# Patient Record
Sex: MALE | Race: WHITE | Employment: OTHER | URBAN - METROPOLITAN AREA
[De-identification: names, ages, dates, MRNs, and addresses within clinical notes are randomized per-mention and may not be internally consistent; named-entity substitution may affect disease eponyms.]

---

## 2017-12-01 ENCOUNTER — ALLIED HEALTH/NURSE VISIT (OUTPATIENT)
Dept: FAMILY MEDICINE | Facility: CLINIC | Age: 67
End: 2017-12-01
Payer: MEDICARE

## 2017-12-01 DIAGNOSIS — Z23 NEED FOR PROPHYLACTIC VACCINATION AND INOCULATION AGAINST INFLUENZA: Primary | ICD-10-CM

## 2017-12-01 PROCEDURE — 99207 ZZC NO CHARGE NURSE ONLY: CPT

## 2017-12-01 PROCEDURE — 90662 IIV NO PRSV INCREASED AG IM: CPT

## 2017-12-01 PROCEDURE — G0008 ADMIN INFLUENZA VIRUS VAC: HCPCS

## 2017-12-01 NOTE — PROGRESS NOTES

## 2018-01-22 ENCOUNTER — HOSPITAL ENCOUNTER (OUTPATIENT)
Dept: MRI IMAGING | Facility: CLINIC | Age: 68
Discharge: HOME | End: 2018-01-22
Attending: PODIATRIST | Admitting: PODIATRIST
Payer: MEDICARE

## 2018-01-22 DIAGNOSIS — M25.571 RIGHT ANKLE PAIN, UNSPECIFIED CHRONICITY: ICD-10-CM

## 2018-01-22 PROCEDURE — 73721 MRI JNT OF LWR EXTRE W/O DYE: CPT | Mod: RT

## 2018-01-29 ENCOUNTER — OFFICE VISIT (OUTPATIENT)
Dept: FAMILY MEDICINE | Facility: CLINIC | Age: 68
End: 2018-01-29
Payer: MEDICARE

## 2018-01-29 VITALS
BODY MASS INDEX: 32.07 KG/M2 | DIASTOLIC BLOOD PRESSURE: 80 MMHG | WEIGHT: 224 LBS | SYSTOLIC BLOOD PRESSURE: 132 MMHG | RESPIRATION RATE: 14 BRPM | TEMPERATURE: 97.6 F | HEIGHT: 70 IN | HEART RATE: 67 BPM

## 2018-01-29 DIAGNOSIS — G47.33 OBSTRUCTIVE SLEEP APNEA SYNDROME: ICD-10-CM

## 2018-01-29 DIAGNOSIS — Z01.818 PREOP GENERAL PHYSICAL EXAM: Primary | ICD-10-CM

## 2018-01-29 DIAGNOSIS — E78.2 MIXED HYPERLIPIDEMIA: ICD-10-CM

## 2018-01-29 DIAGNOSIS — I10 ESSENTIAL HYPERTENSION: ICD-10-CM

## 2018-01-29 DIAGNOSIS — F32.0 MAJOR DEPRESSIVE DISORDER, SINGLE EPISODE, MILD (H): ICD-10-CM

## 2018-01-29 LAB
ANION GAP SERPL CALCULATED.3IONS-SCNC: 4 MMOL/L (ref 3–14)
BUN SERPL-MCNC: 16 MG/DL (ref 7–30)
CALCIUM SERPL-MCNC: 8.8 MG/DL (ref 8.5–10.1)
CHLORIDE SERPL-SCNC: 103 MMOL/L (ref 94–109)
CO2 SERPL-SCNC: 29 MMOL/L (ref 20–32)
CREAT SERPL-MCNC: 0.89 MG/DL (ref 0.66–1.25)
GFR SERPL CREATININE-BSD FRML MDRD: 85 ML/MIN/1.7M2
GLUCOSE SERPL-MCNC: 87 MG/DL (ref 70–99)
LDLC SERPL DIRECT ASSAY-MCNC: 108 MG/DL
POTASSIUM SERPL-SCNC: 3.9 MMOL/L (ref 3.4–5.3)
SODIUM SERPL-SCNC: 136 MMOL/L (ref 133–144)

## 2018-01-29 PROCEDURE — 36415 COLL VENOUS BLD VENIPUNCTURE: CPT | Performed by: FAMILY MEDICINE

## 2018-01-29 PROCEDURE — 93000 ELECTROCARDIOGRAM COMPLETE: CPT | Performed by: FAMILY MEDICINE

## 2018-01-29 PROCEDURE — 83721 ASSAY OF BLOOD LIPOPROTEIN: CPT | Performed by: FAMILY MEDICINE

## 2018-01-29 PROCEDURE — 80048 BASIC METABOLIC PNL TOTAL CA: CPT | Performed by: FAMILY MEDICINE

## 2018-01-29 PROCEDURE — 99205 OFFICE O/P NEW HI 60 MIN: CPT | Performed by: FAMILY MEDICINE

## 2018-01-29 RX ORDER — ATORVASTATIN CALCIUM 10 MG/1
10 TABLET, FILM COATED ORAL
Status: ON HOLD | COMMUNITY
Start: 2017-10-02 | End: 2021-08-24

## 2018-01-29 RX ORDER — HYDROCHLOROTHIAZIDE 25 MG/1
25 TABLET ORAL DAILY
COMMUNITY
Start: 2020-12-15

## 2018-01-29 RX ORDER — ENALAPRIL MALEATE 20 MG/1
20 TABLET ORAL DAILY
COMMUNITY
Start: 2021-07-14

## 2018-01-29 NOTE — MR AVS SNAPSHOT
After Visit Summary   1/29/2018    Jay Carter    MRN: [id number]           Patient Information     Date Of Birth          1950        Visit Information        Provider Department      1/29/2018 9:00 AM Moe Yung MD [facility]        Today's Diagnoses     Preop general physical exam    -  1    Essential hypertension        Mixed hyperlipidemia        Major depressive disorder, single episode, mild (H)        Obstructive sleep apnea syndrome          Care Instructions    Follow preoperative instructions given by your surgeon.  Your results and recommendations will be available to your surgeon through fax.  We will notify you of any abnormality with today's tests if present.  Please follow up at your convenience after the surgery for your adult well-exam and chronic condition management.    You may take your blood pressure medication (enalapril and hydrochlorothiazide) on day of surgery to keep your blood pressure in control.  No Aspirin, ibuprofen, or naproxen before the surgery.  May take all other medications as scheduled except for morning of surgery.    Before Your Surgery      Call your surgeon if there is any change in your health. This includes signs of a cold or flu (such as a sore throat, runny nose, cough, rash or fever).    Do not smoke, drink alcohol or take over the counter medicine (unless your surgeon or primary care doctor tells you to) for the 24 hours before and after surgery.    If you take prescribed drugs: Follow your doctor s orders about which medicines to take and which to stop until after surgery.    Eating and drinking prior to surgery: follow the instructions from your surgeon    Take a shower or bath the night before surgery. Use the soap your surgeon gave you to gently clean your skin. If you do not have soap from your surgeon, use your regular soap. Do not shave or scrub the surgery site.  Wear clean pajamas and have clean sheets  "on your bed.           Follow-ups after your visit        Additional Services     SLEEP EVALUATION & MANAGEMENT REFERRAL - ADULT [facility]  [phone] (Age 2 and up)       Please be aware that coverage of these services is subject to the terms and limitations of your health insurance plan.  Call member services at your health plan with any benefit or coverage questions.      Please bring the following to your appointment:    >>   List of current medications   >>   This referral request   >>   Any documents/labs given to you for this referral                      Future tests that were ordered for you today     Open Future Orders        Priority Expected Expires Ordered    SLEEP EVALUATION & MANAGEMENT REFERRAL - [facility]  [phone] (Age 2 and up) Routine  1/29/2019 1/29/2018            Who to contact     If you have questions or need follow up information about today's clinic visit or your schedule please contact [facility] directly at [phone].  Normal or non-critical lab and imaging results will be communicated to you by MyChart, letter or phone within 4 business days after the clinic has received the results. If you do not hear from us within 7 days, please contact the clinic through [employer] or phone. If you have a critical or abnormal lab result, we will notify you by phone as soon as possible.  Submit refill requests through GoTable or call your pharmacy and they will forward the refill request to us. Please allow 3 business days for your refill to be completed.          Additional Information About Your Visit        GoTable Information     GoTable lets you send messages to your doctor, view your test results, renew your prescriptions, schedule appointments and more. To sign up, go to [email or web address] . Click on \"Log in\" on the left side of the screen, which will take you to the Welcome page. Then click on \"Sign up Now\" on the " "right side of the page.     You will be asked to enter the access code listed below, as well as some personal information. Please follow the directions to create your username and password.     Your access code is: [email or web address]  Expires: 3/1/2018 12:05 PM     Your access code will  in 90 days. If you need help or a new code, please call your [place] clinic or [phone].        Care EveryWhere ID     This is your Care EveryWhere ID. This could be used by other organizations to access your [place] medical records  [id number]        Your Vitals Were     Pulse Temperature Respirations Height BMI (Body Mass Index)       67 97.6  F (36.4  C) (Tympanic) 14 5' 10\" (1.778 m) 32.14 kg/m2        Blood Pressure from Last 3 Encounters:   18 132/80    Weight from Last 3 Encounters:   18 224 lb (101.6 kg)              We Performed the Following     Basic metabolic panel     EKG 12-lead complete w/read - Clinics     LDL cholesterol direct        Primary Care Provider Office Phone # Fax #    [facility] [phone] [address] [address] [place], [place] [address]        Equal Access to Services     JAROD GUARDADO AH: Hadii jami romeroo Sosamaraali, waaxda luqadaha, qaybta kaalmada adeegyada, urvashi dooley. So [facility] [phone].    ATENCIÓN: Si habla español, tiene a mccarthy disposición servicios gratuitos de asistencia lingüística. Llame al [phone].    We comply with applicable federal civil rights laws and [place] laws. We do not discriminate on the basis of race, color, national origin, age, disability, sex, sexual orientation, or gender identity.            Thank you!     Thank you for choosing [facility]  for your care. Our goal is always to provide you with excellent care. Hearing back from our patients is one way we can continue to improve our services. Please take a few minutes to complete the written survey that you may receive in the " mail after your visit with us. Thank you!             Your Updated Medication List - Protect others around you: Learn how to safely use, store and throw away your medicines at www.disposemymeds.org.          This list is accurate as of 1/29/18  9:51 AM.  Always use your most recent med list.                   Brand Name Dispense Instructions for use Diagnosis    atorvastatin 10 MG tablet    LIPITOR     Take 10 mg by mouth        enalapril 20 MG tablet    VASOTEC          hydrochlorothiazide 25 MG tablet    HYDRODIURIL     Take 25 mg by mouth        GILBERT MULTIVITAMIN FOR MEN Tabs      Take 1 tablet by mouth daily        OXYCODONE HCL PO      Take 10 mg by mouth every 6 hours as needed        VIAGRA 100 MG tablet   Generic drug:  sildenafil      Take 100 mg by mouth daily as needed Take 1 tablet by mouth once daily if needed for Erectile Dysfunction. Take 30min to 4 hours before sexual activity. Max 100mg/24hr.        WELLBUTRIN XL PO      Take 300 mg by mouth daily

## 2018-01-29 NOTE — NURSING NOTE
"Chief Complaint   Patient presents with     Pre-Op Exam     DOS 2/6/18       Initial /73  Pulse 67  Temp 97.6  F (36.4  C) (Tympanic)  Ht 5' 10\" (1.778 m)  Wt 224 lb (101.6 kg)  BMI 32.14 kg/m2 Estimated body mass index is 32.14 kg/(m^2) as calculated from the following:    Height as of this encounter: 5' 10\" (1.778 m).    Weight as of this encounter: 224 lb (101.6 kg).  Medication Reconciliation: complete  Frida Yoder CMA    "

## 2018-01-29 NOTE — PROGRESS NOTES
[facility]  [address] [address]  [phone]  Dept: [phone]    PRE-OP EVALUATION:  Today's date: 2018    Jay Carter (: 1950) presents for pre-operative evaluation assessment as requested by Dr. Main.  He requires evaluation and anesthesia risk assessment prior to undergoing surgery/procedure for treatment of right ankle .  Proposed procedure: right ankle tendon repair, ligament repair    Date of Surgery/ Procedure: 18  Time of Surgery/ Procedure: unknown  Hospital/Surgical Facility: [facility]  Fax number for surgical facility: [phone]  Primary Physician: [facility], [facility]  Type of Anesthesia Anticipated: to be determined    Patient has a Health Care Directive or Living Will:  NO    1. NO - Do you have a history of heart attack, stroke, stent, bypass or surgery on an artery in the head, neck, heart or legs?  2. NO - Do you ever have any pain or discomfort in your chest?  3. NO - Do you have a history of  Heart Failure?  4. NO - Are you troubled by shortness of breath when: walking on the level, up a slight hill or at night?  5. NO - Do you currently have a cold, bronchitis or other respiratory infection?  6. NO - Do you have a cough, shortness of breath or wheezing?  7. YES - DO YOU SOMETIMES GET PAINS IN THE CALVES OF YOUR LEGS WHEN YOU WALK? Due to torn tendon in right ankle  8. NO - Do you or anyone in your family have previous history of blood clots?  9. NO - Do you or does anyone in your family have a serious bleeding problem such as prolonged bleeding following surgeries or cuts?  10. NO - Have you ever had problems with anemia or been told to take iron pills?  11. NO - Have you had any abnormal blood loss such as black, tarry or bloody stools, or abnormal vaginal bleeding?  12. YES - HAVE YOU EVER HAD A BLOOD TRANSFUSION? Blood transfusion in the past with no complications  13. NO - Have you or any of your  relatives ever had problems with anesthesia?  14. YES - DO YOU HAVE SLEEP APNEA, EXCESSIVE SNORING OR DAYTIME DROWSINESS? Pt has been dx with sleep apnea but does not use any applicances.  15. NO - Do you have any prosthetic heart valves?  16. NO - Do you have prosthetic joints?  17. NO - Is there any chance that you may be pregnant?      HPI:                                                      Brief HPI related to upcoming procedure: Patient has peroneus brevis and longus tear. Hence, planned surgery. Reviewed above with patient.        See problem list for active medical problems.  Problems all longstanding and stable, except as noted/documented.  See ROS for pertinent symptoms related to these conditions.                                                                                                  .  HYPERTENSION - Patient has longstanding history of mod-severe HTN , currently denies any symptoms referable to elevated blood pressure. Specifically denies chest pain, palpitations, dyspnea, orthopnea, PND or peripheral edema. Blood pressure readings have been in normal range. Current medication regimen is as listed below. Patient denies any side effects of medication.                                                                                                                                                                                            .  HYPERLIPIDEMIA - Patient has a long history of significant Hyperlipidemia requiring medication for treatment with recent unable to assess control. Patient reports no problems or side effects with the medication.                                                                                                                                                         .  DEPRESSION - Patient has a long history of Depression of moderate severity requiring medication for control with recent symptoms being stable..Current symptoms of depression include none.                                                                                                                                                                                     .    MEDICAL HISTORY:                                                      Patient Active Problem List    Diagnosis Date Noted     Hypertension 08/24/2016     Priority: Medium     Overview:   Enalapril started 11/2013       Hyperlipidemia 08/24/2016     Priority: Medium     Overview:   Pretreatment [id number]/5/2013 Chol 203, TG 90, HDL 41, . Strong maternal FH of CAD.  Lipitor 10 mg started 3/2014.        Obstructive sleep apnea syndrome 08/24/2016     Priority: Medium     Overview:   Initial sleep study 7/2013.  His wife passed away (cancer) in Aug, 2013.  He did not start CPAP at that time.       Major depressive disorder, single episode, mild (H) 02/25/2016     Priority: Medium      History reviewed. No pertinent past medical history.  History reviewed. No pertinent surgical history.  Current Outpatient Prescriptions   Medication Sig Dispense Refill     atorvastatin (LIPITOR) 10 MG tablet Take 10 mg by mouth       enalapril (VASOTEC) 20 MG tablet        hydrochlorothiazide (HYDRODIURIL) 25 MG tablet Take 25 mg by mouth       Multiple Vitamins-Minerals (GILBERT MULTIVITAMIN FOR MEN) TABS Take 1 tablet by mouth daily       sildenafil (VIAGRA) 100 MG tablet Take 100 mg by mouth daily as needed Take 1 tablet by mouth once daily if needed for Erectile Dysfunction. Take 30min to 4 hours before sexual activity. Max 100mg/24hr.       BuPROPion HCl (WELLBUTRIN XL PO) Take 300 mg by mouth daily       OXYCODONE HCL PO Take 10 mg by mouth every 6 hours as needed       [DISCONTINUED] ENALAPRIL MALEATE PO Take 20 mg by mouth daily       [DISCONTINUED] HYDROCHLOROTHIAZIDE PO Take 25 mg by mouth daily       OTC products: None, except as noted above    Allergies   Allergen Reactions     Ampicillin Rash     Vancomycin Tinnitus     Ciprofloxacin      Adhesive Tape  "Rash      Latex Allergy: NO    Social History   Substance Use Topics     Smoking status: Former Smoker     Years: 3.00     Smokeless tobacco: Never Used     Alcohol use Yes      Comment: occasional     History   Drug Use No       REVIEW OF SYSTEMS:                                                    C: NEGATIVE for fever, chills, or change in weight  INTEGUMENTARY/SKIN: patient reports recurrent eczema treated with hydrocortisone topically  E: NEGATIVE for vision changes or irritation  E/M: NEGATIVE for ear, mouth and throat problems  R: NEGATIVE for significant cough or SOB  CV: NEGATIVE for chest pain, palpitations or peripheral edema  GI: NEGATIVE for nausea, abdominal pain, heartburn, or change in bowel habits  : NEGATIVE for frequency, dysuria, or hematuria  M: NEGATIVE for significant arthralgias or myalgia  N: NEGATIVE for weakness, dizziness or paresthesias  E: NEGATIVE for temperature intolerance, skin/hair changes  H: NEGATIVE for bleeding problems  P: NEGATIVE for changes in mood or affect    EXAM:                                                    /80  Pulse 67  Temp 97.6  F (36.4  C) (Tympanic)  Resp 14  Ht 5' 10\" (1.778 m)  Wt 224 lb (101.6 kg)  BMI 32.14 kg/m2  GENERAL APPEARANCE: obese, alert and no distress; ambulatory w/o assist  EYES: pink conj, no icterus, PERRL, EOMI  HENT: ear canals and TM's normal, nose and mouth without ulcers or lesions, oropharynx clear and oral mucous membranes moist  NECK: no adenopathy, no asymmetry, masses, or scars and thyroid normal to palpation  RESP: lungs clear to auscultation - no rales, rhonchi or wheezes  CV: regular rates and rhythm, normal S1 S2, no S3 or S4, no murmur, click or rub, no peripheral edema and peripheral pulses strong  ABDOMEN: soft, nontender, no hepatosplenomegaly, no masses and bowel sounds normal  MS: no musculoskeletal defects are noted and gait is age appropriate without ataxia  SKIN: good turgor, no " rash/jaundice/ecchymosis  NEURO: Normal strength and tone, sensory exam grossly normal, mentation intact and speech normal    DIAGNOSTICS:                                                      EKG: Normal Sinus Rhythm, normal axis, normal intervals, no acute ST/T changes c/w ischemia, no LVH by voltage criteria, unchanged from previous tracings  Labs Resulted Today:   Results for orders placed or performed in visit on 01/29/18   LDL cholesterol direct   Result Value Ref Range    LDL Cholesterol Direct 108 (H) <100 mg/dL   Basic metabolic panel   Result Value Ref Range    Sodium 136 133 - 144 mmol/L    Potassium 3.9 3.4 - 5.3 mmol/L    Chloride 103 94 - 109 mmol/L    Carbon Dioxide 29 20 - 32 mmol/L    Anion Gap 4 3 - 14 mmol/L    Glucose 87 70 - 99 mg/dL    Urea Nitrogen 16 7 - 30 mg/dL    Creatinine 0.89 0.66 - 1.25 mg/dL    GFR Estimate 85 >60 mL/min/1.7m2    GFR Estimate If Black >90 >60 mL/min/1.7m2    Calcium 8.8 8.5 - 10.1 mg/dL       No results for input(s): HGB, PLT, INR, NA, POTASSIUM, CR, A1C in the last 32094 hours.     IMPRESSION:                                                    Reason for surgery/procedure: peroneus brevis and longus tendon rupture/tear, right  Diagnosis/reason for consult: preoperative evaluation, hypertension, hyperlipidemia, TARA    The proposed surgical procedure is considered INTERMEDIATE risk.    REVISED CARDIAC RISK INDEX  The patient has the following serious cardiovascular risks for perioperative complications such as (MI, PE, VFib and 3  AV Block):  No serious cardiac risks  INTERPRETATION: 0 risks: Class I (very low risk - 0.4% complication rate)    The patient has the following additional risks for perioperative complications:  No identified additional risks  The ASCVD Risk score (Sanjuanita CARMINA Jr, et al., 2013) failed to calculate for the following reasons:    Cannot find a previous HDL lab    Cannot find a previous total cholesterol lab      ICD-10-CM    1. Preop general  physical exam Z01.818 Basic metabolic panel     EKG 12-lead complete w/read - Clinics   2. Essential hypertension I10 Basic metabolic panel     EKG 12-lead complete w/read - Clinics   3. Mixed hyperlipidemia E78.2 LDL cholesterol direct     EKG 12-lead complete w/read - Clinics   4. Major depressive disorder, single episode, mild (H) F32.0    5. Obstructive sleep apnea syndrome G47.33 SLEEP EVALUATION & MANAGEMENT REFERRAL - ADULT -[facility]  [phone] (Age 2 and up)       RECOMMENDATIONS:                                                      --Consult hospital rounder / IM to assist post-op medical management    Cardiovascular Risk  Performs 4 METs exercise without symptoms (Light housework (dusting, washing dishes), Climb a flight of stairs and Walk on level ground at 15 minutes per mile (4 miles/hour)) .   EKG done due to hx of hypertension, hyperlipidemia and tp's age >66 y/o. No change to suspect ischemia.  BP is currently <140/90 on two medications.      Obstructive Sleep Apnea (or suspected sleep apnea)  Patient is referred for sleep consult to be done after his surgery.      --Patient is to take all scheduled medications on the day of surgery EXCEPT for modifications listed below.    Anticoagulant or Antiplatelet Medication Use  Patient was advised not to take ASA, or NSAIDs prior to surgery.        ACE Inhibitor or Angiotensin Receptor Blocker (ARB) Use  Ace inhibitor or Angiotensin Receptor Blocker (ARB) and will continue this medication due to the higher risk of uncontrolled perioerative hypertension (e.g. neurosurgical procedure)      APPROVAL GIVEN to proceed with proposed procedure, without further diagnostic evaluation       Signed Electronically by: Moe Yung MD    Copy of this evaluation report is provided to requesting physician.    [place] Preop Guidelines

## 2018-01-29 NOTE — LETTER
January 29, 2018      Jay Carter  [address] [address]        Dear ,    We are writing to inform you of your test results.    BMP showed results within normal range. Non-fasting LDL is above desirable but not in range to be medically treated. Be consistent with low trans fat and saturated fat diet.   Eat food rich in omega-3-fatty acids as you tolerate. (salmon, olive oil)   Eat 5 cups of vegetables, fruits and whole grains per day.  Limit starchy food (white rice, white bread, white pasta, white potatoes) to less than a cup per meal. Minimize sweets, junk food and fastfood. Limit soda beverages to one serving per day; best to avoid it altogether though.   Exercise: moderate intensity sustained for at least 30 mins per episode, goal of 150 mins per week at least.  Combine cardiovascular and resistance exercises.   These exercise recommendations are in addition to your daily activity at work or home.     Schedule Sleep Study as ordered after the surgery. Answer sleep clinic questionnaire prior to that appointment and bring to appointment.     Resulted Orders   LDL cholesterol direct   Result Value Ref Range    LDL Cholesterol Direct 108 (H) <100 mg/dL      Comment:      Above desirable:  100-129 mg/dl  Borderline High:  130-159 mg/dL  High:             160-189 mg/dL  Very high:       >189 mg/dl     Basic metabolic panel   Result Value Ref Range    Sodium 136 133 - 144 mmol/L    Potassium 3.9 3.4 - 5.3 mmol/L    Chloride 103 94 - 109 mmol/L    Carbon Dioxide 29 20 - 32 mmol/L    Anion Gap 4 3 - 14 mmol/L    Glucose 87 70 - 99 mg/dL      Comment:      Non Fasting    Urea Nitrogen 16 7 - 30 mg/dL    Creatinine 0.89 0.66 - 1.25 mg/dL    GFR Estimate 85 >60 mL/min/1.7m2      Comment:      Non  GFR Calc    GFR Estimate If Black >90 >60 mL/min/1.7m2      Comment:       GFR Calc    Calcium 8.8 8.5 - 10.1 mg/dL       If you have any questions or concerns,  please call the clinic at the number listed above.       Sincerely,        Moe Yung MD

## 2018-01-29 NOTE — PATIENT INSTRUCTIONS
Follow preoperative instructions given by your surgeon.  Your results and recommendations will be available to your surgeon through fax.  We will notify you of any abnormality with today's tests if present.  Please follow up at your convenience after the surgery for your adult well-exam and chronic condition management.    You may take your blood pressure medication (enalapril and hydrochlorothiazide) on day of surgery to keep your blood pressure in control.  No Aspirin, ibuprofen, or naproxen before the surgery.  May take all other medications as scheduled except for morning of surgery.    Before Your Surgery      Call your surgeon if there is any change in your health. This includes signs of a cold or flu (such as a sore throat, runny nose, cough, rash or fever).    Do not smoke, drink alcohol or take over the counter medicine (unless your surgeon or primary care doctor tells you to) for the 24 hours before and after surgery.    If you take prescribed drugs: Follow your doctor s orders about which medicines to take and which to stop until after surgery.    Eating and drinking prior to surgery: follow the instructions from your surgeon    Take a shower or bath the night before surgery. Use the soap your surgeon gave you to gently clean your skin. If you do not have soap from your surgeon, use your regular soap. Do not shave or scrub the surgery site.  Wear clean pajamas and have clean sheets on your bed.

## 2018-04-05 ENCOUNTER — TELEPHONE (OUTPATIENT)
Dept: FAMILY MEDICINE | Facility: CLINIC | Age: 68
End: 2018-04-05

## 2018-04-05 NOTE — TELEPHONE ENCOUNTER
Panel Management Review      Composite cancer screening  Chart review shows that this patient is due/due soon for the following Colonoscopy  Summary:    Patient is due/failing the following:   COLONOSCOPY and PHQ9    Action needed:   Patient needs to do PHQ9. and Patient needs referral/order: colonoscopy    Type of outreach:    Sent letter. and also phq-9 for pt to fill out and return    Questions for provider review:    None                                                                                                                                    Frida Yoder,TOY

## 2018-04-05 NOTE — LETTER
April 5, 2018      Jay Carter  [address] [address]          At this time you are due for a Colon Cancer Screening. Here is some information regarding this testing.     Recommended every 5-10 years, depending on your history, in order to prevent and detect colon cancer at its earliest stages.  Colon cancer is now the second leading cause of death in the [place] for both men and women and there are over 130,000 new cases and 50,000 deaths per year from colon cancer.  Colonoscopies can prevent 90-95% of these deaths.  Problem lesions can be removed before they ever become cancer. This test is not only looking for cancer, but also getting rid of precancerious lesions. You are usually given some sedation which makes the test very comfortable for most people.      If you do not wish to do a colonoscopy or cannot afford to do one, at this time, there is another option. It is called a FIT test or Fecal Immunochemical Occult Blood Test (take home stool sample kit).  It does not replace the colonoscopy for colorectal cancer screening, but it can detect hidden bleeding in the lower colon.  It does need to be repeated every year and if a positive result is obtained, you would be referred for a colonoscopy. The FIT test is really easy to do and does not require any  diet or medication restrictions and involves only one collection sample.      If you have completed either one of these tests or had a flexible sigmoidoscopy in the past five years at another facility, please have the records sent to our clinic so that we can best coordinate your care and update your chart.  Please call us if you have questions or would like arrange either to do a colonoscopy or obtain the necessary test kit for the Fecal Occult Test.      Sincerely,        Moe Yung MD

## 2018-11-26 ENCOUNTER — RECORDS - HEALTHEAST (OUTPATIENT)
Dept: ADMINISTRATIVE | Facility: OTHER | Age: 68
End: 2018-11-26

## 2019-01-09 ENCOUNTER — HOSPITAL ENCOUNTER (OUTPATIENT)
Dept: PHYSICAL THERAPY | Facility: CLINIC | Age: 69
Setting detail: THERAPIES SERIES
End: 2019-01-09
Attending: ORTHOPAEDIC SURGERY
Payer: COMMERCIAL

## 2019-01-09 PROCEDURE — 97161 PT EVAL LOW COMPLEX 20 MIN: CPT | Mod: GP

## 2019-01-09 PROCEDURE — G8978 MOBILITY CURRENT STATUS: HCPCS | Mod: GP,CJ

## 2019-01-09 PROCEDURE — G8979 MOBILITY GOAL STATUS: HCPCS | Mod: GP,CI

## 2019-01-09 PROCEDURE — 97110 THERAPEUTIC EXERCISES: CPT | Mod: GP

## 2019-01-09 NOTE — PROGRESS NOTES
01/09/19 0900   General Information   Type of Visit Initial OP Ortho PT Evaluation   Start of Care Date 01/09/19   Referring Physician Dr Acosta   Patient/Family Goals Statement strengthen his L to prepare for surgery, get rid of hip pain   Orders Evaluate and Treat   Date of Order 01/08/19   Insurance Type Medicare;are   Medical Diagnosis L lateral hip pain   Surgical/Medical history reviewed Yes  (Ca, depression, HTN, smoking, mult surgeries)   Body Part(s)   Body Part(s) Hip;Knee   Presentation and Etiology   Pertinent history of current problem (include personal factors and/or comorbidities that impact the POC) Pt had L knee surgery in 1966. Now the MD says he needs a L TKA. It is causing L hip pain with the way he is walking. He injured his L hip 2 years ago cross country skiing after falling on his hip. He had quite a bit of pain initially, but now it is better but the pain has never gone away. He is here to strengthen his leg to prepare for surgery.   Impairments H. Impaired gait;A. Pain   Functional Limitations perform activities of daily living;perform desired leisure / sports activities  (sleeping, walking >75 yds jeanine on uneven ground)   Symptom Location L anterolateral hip, L med knee   How/Where did it occur With a fall   Onset date of current episode/exacerbation 03/21/18   Chronicity Chronic   Pain rating (0-10 point scale) Best (/10);Worst (/10)   Best (/10) 0  (at rest)   Worst (/10) 8  (walking on uneven ground)   Pain quality G. Cramping   Frequency of pain/symptoms C. With activity   Pain/symptoms are: The same all the time   Pain/symptoms exacerbated by B. Walking  (sleeping)   Pain/symptoms eased by A. Sitting;C. Rest;I. OTC medication(s)   Progression of symptoms since onset: Worsened   Prior Level of Function   Functional Level Prior Comment independent   Current Level of Function   Current Community Support Family/friend caregiver   Patient role/employment history F. Retired   [facility]   Home/community accessibility stairs, drives   Current equipment-Gait/Locomotion None   Fall Risk Screen   Fall screen completed by PT   Have you fallen 2 or more times in the past year? No   Have you fallen and had an injury in the past year? No   Is patient a fall risk? No   Functional Scales   Functional Scales Other   Other Scales  LEFS 49/80   Knee Objective Findings   Side (if bilateral, select both right and left) Left   Gait/Locomotion slightly fwd flexed, wide GUANAKITO, lat trunk dev B, normal veda   Balance/Proprioception (Single Leg Stance) R 3\", L 3\" with incr deviations   Knee ROM Comment R 0*-129*   Lachmans Test -   Anterior Drawer Test -   Posterior Drawer Test -   Varus Stress Test -   Valgus Stress Test -   Lauren's Test -   Apley's Test -   Apprehension Test -   Palpation no palpable tenderness along joint line, MCL, LCL, patellar tendon   Left Knee Extension AROM 0*   Left Knee Flexion AROM 112*   Left Knee Flexion PROM 128*   Left Hamstring Flexibility tight at 50*   Left Hip Flexor Flexibility mildly tight   Left Quadricep Flexibility mildly tight   Left ITB Flexibility mod tight   Hip Objective Findings   Side (if bilateral, select both right and left) Left   Hip ROM Comments FADIR, hip IR and Obers incr L med knee pain   Hip/Knee Strength Comments R PF 4+/5, L 5/5   Straight Leg Raise Test -   LINO Test -   FADIR Test L incr L med knee pain   Palpation tender L tensor fascia tiffanie, L ant groin   Left Hip Flexion PROM WFL   Left Hip Abduction PROM WFL   Left Hip Adduction PROM mildly limited   Left Hip ER PROM WFL   Left Hip IR PROM mod limited   Left Hip Ext PROM WFL   Left Hip Flexion Strength 5/5   Left Hip Abduction Strength 5/5   Left Hip Extension Strength 4+/5   Left Hip IR Strength 5-/5   Left Hip ER Strength 4/5   Left Knee Flexion Strength 5/5   Left Knee Extension Strength 5/5   Obers/ITB Flexibility L tight   Planned Therapy Interventions   Planned " Therapy Interventions strengthening;manual therapy;stretching;balance training   Clinical Impression   Criteria for Skilled Therapeutic Interventions Met yes, treatment indicated   PT Diagnosis L hip pain. Signs and symptoms suggest tensor fascia tiffanie strain with pain referral to  med knee.   Influenced by the following impairments pain, weakness   Functional limitations due to impairments walking on even and uneven ground, sleeping   Clinical Presentation Stable/Uncomplicated   Clinical Presentation Rationale clinical judgement   Clinical Decision Making (Complexity) Low complexity   Therapy Frequency 1 time/week   Predicted Duration of Therapy Intervention (days/wks) 6 weeks   Risk & Benefits of therapy have been explained Yes   Patient, Family & other staff in agreement with plan of care Yes   Education Assessment   Preferred Learning Style Listening;Demonstration;Pictures/video   Barriers to Learning No barriers   ORTHO GOALS   PT Ortho Eval Goals 1;2;3;4   Ortho Goal 1   Goal Identifier 1   Goal Description Pt will be able to sleep through the night without waking from pain.   Target Date 01/30/19   Ortho Goal 2   Goal Identifier 2   Goal Description Pt will be able to walk 1 mile on level surfaces with < 3/10 pain.   Target Date 02/13/19   Ortho Goal 3   Goal Identifier 3   Goal Description Pt will be able to walk 1 mile on uneven surfaces with < 3/10 pain.   Target Date 02/20/19   Ortho Goal 4   Goal Identifier 4   Goal Description Pt will be independent with HEP for optimal functional recovery.   Target Date 02/20/19   Total Evaluation Time   PT Eval, Low Complexity Minutes (01603) 30   Therapy Certification   Certification date from 01/09/19   Certification date to 04/08/19   Medical Diagnosis L lateral hip pain     Lavern Borjas PT

## 2019-01-09 NOTE — PROGRESS NOTES
[facility]          OUTPATIENT PHYSICAL THERAPY ORTHOPEDIC EVALUATION  PLAN OF TREATMENT FOR OUTPATIENT REHABILITATION  (COMPLETE FOR INITIAL CLAIMS ONLY)  Patient's Last Name, First Name, M.I.  YOB: 1950  Jay Carter    Provider s Name:  [facility]   Medical Record No.  [id number]   Start of Care Date:  01/09/19   Onset Date:  03/21/18   Type:     _X__PT   ___OT   ___SLP Medical Diagnosis:  L lateral hip pain     PT Diagnosis:  L hip pain. Signs and symptoms suggest tensor fascia tiffanie strain with pain referral to L med knee.   Visits from SOC:  1      _________________________________________________________________________________  Plan of Treatment/Functional Goals:  strengthening, manual therapy, stretching, balance training           Goals  Goal Identifier: 1  Goal Description: Pt will be able to sleep through the night without waking from pain.  Target Date: 01/30/19    Goal Identifier: 2  Goal Description: Pt will be able to walk 1 mile on level surfaces with < 3/10 pain.  Target Date: 02/13/19    Goal Identifier: 3  Goal Description: Pt will be able to walk 1 mile on uneven surfaces with < 3/10 pain.  Target Date: 02/20/19    Goal Identifier: 4  Goal Description: Pt will be independent with HEP for optimal functional recovery.  Target Date: 02/20/19                                                Therapy Frequency:  1 time/week  Predicted Duration of Therapy Intervention:  6 weeks    JAYME SALAS PT                 I CERTIFY THE NEED FOR THESE SERVICES FURNISHED UNDER        THIS PLAN OF TREATMENT AND WHILE UNDER MY CARE .             Physician Signature               Date    X_____________________________________________________                             Certification Date From:  01/09/19   Certification Date To:  04/08/19    Referring Provider:  Dr Acosta    Initial Assessment        See Epic Evaluation Start of Care Date: 01/09/19

## 2019-01-10 ENCOUNTER — RECORDS - HEALTHEAST (OUTPATIENT)
Dept: ADMINISTRATIVE | Facility: OTHER | Age: 69
End: 2019-01-10

## 2019-01-16 ENCOUNTER — HOSPITAL ENCOUNTER (OUTPATIENT)
Dept: PHYSICAL THERAPY | Facility: CLINIC | Age: 69
Setting detail: THERAPIES SERIES
End: 2019-01-16
Attending: ORTHOPAEDIC SURGERY
Payer: COMMERCIAL

## 2019-01-16 PROCEDURE — 97110 THERAPEUTIC EXERCISES: CPT | Mod: GP

## 2019-01-17 ENCOUNTER — HOSPITAL ENCOUNTER (OUTPATIENT)
Dept: MRI IMAGING | Facility: CLINIC | Age: 69
Discharge: HOME | End: 2019-01-17
Attending: ORTHOPAEDIC SURGERY | Admitting: ORTHOPAEDIC SURGERY
Payer: COMMERCIAL

## 2019-01-17 DIAGNOSIS — M25.559 HIP PAIN: ICD-10-CM

## 2019-01-17 PROCEDURE — 72195 MRI PELVIS W/O DYE: CPT

## 2019-01-23 ENCOUNTER — HOSPITAL ENCOUNTER (OUTPATIENT)
Dept: PHYSICAL THERAPY | Facility: CLINIC | Age: 69
Setting detail: THERAPIES SERIES
End: 2019-01-23
Attending: ORTHOPAEDIC SURGERY
Payer: COMMERCIAL

## 2019-01-23 PROCEDURE — 97110 THERAPEUTIC EXERCISES: CPT | Mod: GP

## 2019-01-29 ENCOUNTER — HOSPITAL ENCOUNTER (OUTPATIENT)
Dept: PHYSICAL THERAPY | Facility: CLINIC | Age: 69
Setting detail: THERAPIES SERIES
End: 2019-01-29
Attending: ORTHOPAEDIC SURGERY
Payer: COMMERCIAL

## 2019-01-29 PROCEDURE — 97110 THERAPEUTIC EXERCISES: CPT | Mod: GP

## 2019-01-31 ASSESSMENT — MIFFLIN-ST. JEOR
SCORE: 1735.31
SCORE: 1735.31

## 2019-02-06 ENCOUNTER — HOSPITAL ENCOUNTER (OUTPATIENT)
Dept: MEDSURG UNIT | Facility: HOSPITAL | Age: 69
Discharge: HOME | End: 2019-02-07
Attending: UROLOGY | Admitting: UROLOGY
Payer: COMMERCIAL

## 2019-02-06 ENCOUNTER — SURGERY - HEALTHEAST (OUTPATIENT)
Dept: SURGERY | Facility: HOSPITAL | Age: 69
End: 2019-02-06

## 2019-02-06 ENCOUNTER — ANESTHESIA - HEALTHEAST (OUTPATIENT)
Dept: SURGERY | Facility: HOSPITAL | Age: 69
End: 2019-02-06

## 2019-02-06 DIAGNOSIS — C61 PROSTATE CANCER (H): ICD-10-CM

## 2019-02-06 LAB
ABO/RH(D): NORMAL
ANTIBODY SCREEN: NEGATIVE
GLUCOSE BLDC GLUCOMTR-MCNC: 91 MG/DL (ref 70–139)
PLATELET # BLD AUTO: 238 THOU/UL (ref 140–440)

## 2019-02-07 LAB
HGB BLD-MCNC: 13.7 G/DL (ref 14–18)
LAB AP CHARGES (HE HISTORICAL CONVERSION): ABNORMAL
PATH REPORT.COMMENTS IMP SPEC: ABNORMAL
PATH REPORT.FINAL DX SPEC: ABNORMAL
PATH REPORT.GROSS SPEC: ABNORMAL
PATH REPORT.MICROSCOPIC SPEC OTHER STN: ABNORMAL
PATH REPORT.RELEVANT HX SPEC: ABNORMAL
PATHOLOGY SYNOPTIC REPORT: ABNORMAL
RESULT FLAG (HE HISTORICAL CONVERSION): ABNORMAL

## 2019-02-09 ENCOUNTER — AMBULATORY - HEALTHEAST (OUTPATIENT)
Dept: OTHER | Facility: CLINIC | Age: 69
End: 2019-02-09

## 2019-02-27 ENCOUNTER — HOSPITAL ENCOUNTER (OUTPATIENT)
Dept: PHYSICAL THERAPY | Facility: CLINIC | Age: 69
Setting detail: THERAPIES SERIES
End: 2019-02-27
Attending: ORTHOPAEDIC SURGERY
Payer: COMMERCIAL

## 2019-02-27 PROCEDURE — G8978 MOBILITY CURRENT STATUS: HCPCS | Mod: GP,CI | Performed by: PHYSICAL THERAPIST

## 2019-02-27 PROCEDURE — 97110 THERAPEUTIC EXERCISES: CPT | Mod: GP | Performed by: PHYSICAL THERAPIST

## 2019-02-27 PROCEDURE — G8979 MOBILITY GOAL STATUS: HCPCS | Mod: GP,CI | Performed by: PHYSICAL THERAPIST

## 2019-02-27 NOTE — PROGRESS NOTES
*This note serves as the Discharge Note, as pt did not return and this is last known status.      RECERTIFICATION    Jay Carter  1950    Session Number: 5/6 [facility] since start of care.    Reasons for Continuing Treatment:   Gap in treatment due to unrelated surgery    Frequency/Duration  1 times per week for 5 weeks for a total of 5 visits.    Recertification Period  2/20/19 - 3/27/19    Physician Signature:    Date:    X_______________________________________________________    Physician Name: DR. Acosta,     I certify the need for these services furnished under this plan of treatment and while under my care. Physician co-signature of this document indicates review and certification of the therapy plan.  This signature may be written on paper, or electronically signed within EPIC.       Jay Carter   PHYSICAL THERAPY PROGRESS NOTE  02/27/19 0800   Signing Clinician's Name / Credentials   Signing clinician's name / credentials Kris Hoenk, PT   Session Number   Session Number 5/6 [facility] - 3 week gap in treatment   Progress Note/Recertification   Recertification Due Date 04/08/19   PT G-Codes   G-code Mobility: Walking and moving around   Mobility: Walking and Moving Around Current Status () CI   Current Mobility Modifier Rationale can do stairs, walking too far still inc ant hip pain   Mobility: Walking and Moving Around Goal Status () CI   Ortho Goal 1   Goal Description Pt will be able to sleep through the night without waking from pain.   Target Date 01/30/19   Date Met 01/23/19   Ortho Goal 2   Goal Description Pt will be able to walk 1 mile on level surfaces with < 3/10 pain.  (1/23/19 Able to finish stress test on treadmill 1500 steps)   Target Date 02/13/19   Goal Identifier 2   Ortho Goal 3   Goal Description Pt will be able to walk 1 mile on uneven surfaces with < 3/10 pain.  (Able to finish stress test on treadmill 1500 steps)   Target Date 02/20/19   Goal Identifier 3    Ortho Goal 4   Goal Description Pt will be independent with HEP for optimal functional recovery.   Target Date 02/20/19   Goal Identifier 4   Subjective Report   Subjective Report just saw Dr. Acosta , said cont on hip, had prostrate CA surgery 2/6/19, that is why he hasn't been in, does not have TKA surgery date set, L anterior hip pain x 3yrs since he fell XC skiing onto hip.  hip bothers more than knee   Objective Measure 1   Details hip ext tight B L>R  elicits LBP B with prone quad stretch and prone hip ext ROM   Therapeutic Procedure/exercise   Therapeutic Procedures: strength, endurance, ROM, flexibillity minutes (24497) 25   Skilled Intervention modifications to HEP   Patient Response hip flexor stretching increased LBP   Treatment Detail add hip flexor stretches off bed or kneeling, LTRS, quad stretch, cont SL clam progression exercises, skip deadlifts, bridges at home   Plan   Plan for next session resume PT 1x/wk x3-4 more, new focus on stretching ant hip   Total Session Time   Timed Code Treatment Minutes 25   Total Treatment Time (sum of timed and untimed services) 25   Kris Hoenk, PT #[id number]  [facility]

## 2019-05-25 ENCOUNTER — HOSPITAL ENCOUNTER (EMERGENCY)
Facility: CLINIC | Age: 69
Discharge: HOME | End: 2019-05-25
Attending: NURSE PRACTITIONER | Admitting: NURSE PRACTITIONER
Payer: COMMERCIAL

## 2019-05-25 VITALS
BODY MASS INDEX: 30.06 KG/M2 | HEIGHT: 70 IN | SYSTOLIC BLOOD PRESSURE: 145 MMHG | TEMPERATURE: 97.9 F | WEIGHT: 210 LBS | OXYGEN SATURATION: 99 % | RESPIRATION RATE: 16 BRPM | DIASTOLIC BLOOD PRESSURE: 96 MMHG

## 2019-05-25 DIAGNOSIS — M25.561 RIGHT KNEE PAIN: ICD-10-CM

## 2019-05-25 PROCEDURE — G0463 HOSPITAL OUTPT CLINIC VISIT: HCPCS | Performed by: NURSE PRACTITIONER

## 2019-05-25 PROCEDURE — 99213 OFFICE O/P EST LOW 20 MIN: CPT | Mod: Z6 | Performed by: NURSE PRACTITIONER

## 2019-05-25 ASSESSMENT — ENCOUNTER SYMPTOMS
BACK PAIN: 0
JOINT SWELLING: 1
CONSTITUTIONAL NEGATIVE: 1
RESPIRATORY NEGATIVE: 1
COLOR CHANGE: 0
CARDIOVASCULAR NEGATIVE: 1
WOUND: 0

## 2019-05-25 ASSESSMENT — MIFFLIN-ST. JEOR: SCORE: 1723.8

## 2019-05-25 NOTE — ED AVS SNAPSHOT
[facility] Emergency Department  [address] [address]  Phone:  [phone]  Fax:  [phone]                                    Jay Carter   MRN: [id number]    Department:  [facility] Emergency Department   Date of Visit:  5/25/2019           After Visit Summary Signature Page    I have received my discharge instructions, and my questions have been answered. I have discussed any challenges I see with this plan with the nurse or doctor.    ..........................................................................................................................................  Patient/Patient Representative Signature      ..........................................................................................................................................  Patient Representative Print Name and Relationship to Patient    ..................................................               ................................................  Date                                   Time    ..........................................................................................................................................  Reviewed by Signature/Title    ...................................................              ..............................................  Date                                               Time          22EPIC Rev 08/18

## 2019-05-25 NOTE — ED PROVIDER NOTES
"  History     Chief Complaint   Patient presents with     Knee Pain     right knee pain; worse since stepping down out of camper yesterday     HPI  Jay Carter is a 68 year old male who presents to the ER for complaints of right knee pain.  Patient \"tweaked\" right knee while stepping out of a camper yesterday.  Patient does not complain of pain while resting but has pain with walking, full extension, and partial flexion.  Denies any numbness or tingling.  Patient has been icing the knee and taking Tylenol and ibuprofen at home.  The pain occasionally radiates down the back of his calf.  Patient recently seen at [facility] due to chronic/worsening right knee pain and received an x-ray and cortisone shot.    Allergies:  Allergies   Allergen Reactions     Ampicillin Rash     Vancomycin Tinnitus     Ciprofloxacin      Adhesive Tape Rash       Problem List:    Patient Active Problem List    Diagnosis Date Noted     Hypertension 08/24/2016     Priority: Medium     Overview:   Enalapril started 11/2013       Hyperlipidemia 08/24/2016     Priority: Medium     Overview:   Pretreatment fzdkhw1111/5/2013 Chol 203, TG 90, HDL 41, . Strong maternal FH of CAD.  Lipitor 10 mg started 3/2014.        Obstructive sleep apnea syndrome 08/24/2016     Priority: Medium     Overview:   Initial sleep study 7/2013.  His wife passed away (cancer) in Aug, 2013.  He did not start CPAP at that time.       Major depressive disorder, single episode, mild (H) 02/25/2016     Priority: Medium        Past Medical History:    No past medical history on file.    Past Surgical History:    No past surgical history on file.    Family History:    Family History   Problem Relation Age of Onset     Heart Disease Mother      Myocardial Infarction Brother         before age 65     Bone Cancer Brother      Hypertension Brother      Rheumatoid Arthritis Brother        Social History:  Marital Status:   [5]  Social History     Tobacco " "Use     Smoking status: Former Smoker     Years: 3.00     Smokeless tobacco: Never Used   Substance Use Topics     Alcohol use: Yes     Comment: occasional     Drug use: No        Medications:      atorvastatin (LIPITOR) 10 MG tablet   BuPROPion HCl (WELLBUTRIN XL PO)   enalapril (VASOTEC) 20 MG tablet   hydrochlorothiazide (HYDRODIURIL) 25 MG tablet   Multiple Vitamins-Minerals (GILBERT MULTIVITAMIN FOR MEN) TABS   OXYCODONE HCL PO   sildenafil (VIAGRA) 100 MG tablet         Review of Systems   Constitutional: Negative.    Respiratory: Negative.    Cardiovascular: Negative.    Musculoskeletal: Positive for gait problem and joint swelling. Negative for back pain.   Skin: Negative for color change and wound.       Physical Exam   BP: (!) 145/96  Heart Rate: 66  Temp: 97.9  F (36.6  C)  Resp: 16  Height: 177.8 cm (5' 10\")  Weight: 95.3 kg (210 lb)  SpO2: 99 %      Physical Exam   Constitutional: He appears well-developed and well-nourished. No distress.   Cardiovascular: Normal rate and regular rhythm.   Pulmonary/Chest: Effort normal and breath sounds normal. No respiratory distress.   Musculoskeletal:        Right knee: He exhibits decreased range of motion, swelling and effusion. He exhibits no ecchymosis, no deformity, no erythema, normal alignment, no LCL laxity, normal patellar mobility and no MCL laxity. Tenderness found. Medial joint line and lateral joint line tenderness noted.   Scant edema noted immediately distal to the patella. Pain with moderate palpation to medial and lateral knee.    Skin: Skin is warm. Capillary refill takes less than 2 seconds. No erythema.       ED Course        Procedures             No results found for this or any previous visit (from the past 24 hour(s)).    Medications - No data to display    Assessments & Plan (with Medical Decision Making)   Patient is a 68-year-old male who presents for complaints of right knee pain.  Physical exam revealed pain with weightbearing, full " extension, and partial flexion.  Pain with moderate palpation to medial and lateral knee radiating to the calf.  Scant amount of edema noted immediately distal to the patella.  CMS intact.  Discussed with patient option of receiving an x-ray while here.  Patient currently declined the x-ray and states he would like to wait for MRI with Ortho.  May continue over-the-counter meds as needed.  Patient has a knee brace at home that he will wear. Return precautions reviewed, patient agreeable to plan of care, all questions answered. Patient ambulatory upon discharge with no complaints of instability and mild to moderate pain.     I have reviewed the nursing notes.    I have reviewed the findings, diagnosis, plan and need for follow up with the patient.       Medication List      There are no discharge medications for this visit.         Final diagnoses:   Right knee pain       5/25/2019   [facility] EMERGENCY DEPARTMENT     Maria Luisa Callahan, APRN CNP  05/25/19 9035

## 2019-05-30 ENCOUNTER — HOSPITAL ENCOUNTER (OUTPATIENT)
Dept: MRI IMAGING | Facility: CLINIC | Age: 69
Discharge: HOME | End: 2019-05-30
Attending: ORTHOPAEDIC SURGERY | Admitting: ORTHOPAEDIC SURGERY
Payer: COMMERCIAL

## 2019-05-30 DIAGNOSIS — M25.569 KNEE PAIN: ICD-10-CM

## 2019-05-30 PROCEDURE — 73721 MRI JNT OF LWR EXTRE W/O DYE: CPT | Mod: RT

## 2019-10-26 ENCOUNTER — HOSPITAL ENCOUNTER (EMERGENCY)
Facility: CLINIC | Age: 69
Discharge: HOME | End: 2019-10-26
Attending: FAMILY MEDICINE | Admitting: FAMILY MEDICINE
Payer: COMMERCIAL

## 2019-10-26 ENCOUNTER — APPOINTMENT (OUTPATIENT)
Dept: GENERAL RADIOLOGY | Facility: CLINIC | Age: 69
End: 2019-10-26
Attending: FAMILY MEDICINE
Payer: COMMERCIAL

## 2019-10-26 VITALS
OXYGEN SATURATION: 97 % | RESPIRATION RATE: 20 BRPM | TEMPERATURE: 98.3 F | HEART RATE: 80 BPM | HEIGHT: 69 IN | WEIGHT: 205 LBS | BODY MASS INDEX: 30.36 KG/M2 | DIASTOLIC BLOOD PRESSURE: 80 MMHG | SYSTOLIC BLOOD PRESSURE: 146 MMHG

## 2019-10-26 DIAGNOSIS — J40 BRONCHITIS: ICD-10-CM

## 2019-10-26 LAB
BASOPHILS # BLD AUTO: 0 10E9/L (ref 0–0.2)
BASOPHILS NFR BLD AUTO: 0.3 %
DIFFERENTIAL METHOD BLD: NORMAL
EOSINOPHIL # BLD AUTO: 0.1 10E9/L (ref 0–0.7)
EOSINOPHIL NFR BLD AUTO: 1 %
ERYTHROCYTE [DISTWIDTH] IN BLOOD BY AUTOMATED COUNT: 11.8 % (ref 10–15)
HCT VFR BLD AUTO: 43.8 % (ref 40–53)
HGB BLD-MCNC: 14.7 G/DL (ref 13.3–17.7)
IMM GRANULOCYTES # BLD: 0 10E9/L (ref 0–0.4)
IMM GRANULOCYTES NFR BLD: 0.2 %
LYMPHOCYTES # BLD AUTO: 2.2 10E9/L (ref 0.8–5.3)
LYMPHOCYTES NFR BLD AUTO: 20.4 %
MCH RBC QN AUTO: 30.9 PG (ref 26.5–33)
MCHC RBC AUTO-ENTMCNC: 33.6 G/DL (ref 31.5–36.5)
MCV RBC AUTO: 92 FL (ref 78–100)
MONOCYTES # BLD AUTO: 0.9 10E9/L (ref 0–1.3)
MONOCYTES NFR BLD AUTO: 8 %
NEUTROPHILS # BLD AUTO: 7.6 10E9/L (ref 1.6–8.3)
NEUTROPHILS NFR BLD AUTO: 70.1 %
NRBC # BLD AUTO: 0 10*3/UL
NRBC BLD AUTO-RTO: 0 /100
PLATELET # BLD AUTO: 239 10E9/L (ref 150–450)
RBC # BLD AUTO: 4.75 10E12/L (ref 4.4–5.9)
WBC # BLD AUTO: 10.8 10E9/L (ref 4–11)

## 2019-10-26 PROCEDURE — 99214 OFFICE O/P EST MOD 30 MIN: CPT | Mod: Z6 | Performed by: FAMILY MEDICINE

## 2019-10-26 PROCEDURE — G0463 HOSPITAL OUTPT CLINIC VISIT: HCPCS | Mod: 25 | Performed by: FAMILY MEDICINE

## 2019-10-26 PROCEDURE — 85025 COMPLETE CBC W/AUTO DIFF WBC: CPT | Performed by: FAMILY MEDICINE

## 2019-10-26 PROCEDURE — 71046 X-RAY EXAM CHEST 2 VIEWS: CPT

## 2019-10-26 RX ORDER — AZITHROMYCIN 500 MG/1
500 TABLET, FILM COATED ORAL DAILY
Qty: 5 TABLET | Refills: 0 | Status: SHIPPED | OUTPATIENT
Start: 2019-10-26 | End: 2019-10-31

## 2019-10-26 RX ORDER — CODEINE PHOSPHATE AND GUAIFENESIN 10; 100 MG/5ML; MG/5ML
2 SOLUTION ORAL EVERY 4 HOURS PRN
Qty: 240 ML | Refills: 1 | Status: ON HOLD | OUTPATIENT
Start: 2019-10-26 | End: 2021-08-24

## 2019-10-26 ASSESSMENT — MIFFLIN-ST. JEOR: SCORE: 1680.25

## 2019-10-26 NOTE — ED AVS SNAPSHOT
[facility] Emergency Department  [address] [address]  Phone:  [phone]  Fax:  [phone]                                    Jay Carter   MRN: [id number]    Department:  [facility] Emergency Department   Date of Visit:  10/26/2019           After Visit Summary Signature Page    I have received my discharge instructions, and my questions have been answered. I have discussed any challenges I see with this plan with the nurse or doctor.    ..........................................................................................................................................  Patient/Patient Representative Signature      ..........................................................................................................................................  Patient Representative Print Name and Relationship to Patient    ..................................................               ................................................  Date                                   Time    ..........................................................................................................................................  Reviewed by Signature/Title    ...................................................              ..............................................  Date                                               Time          22EPIC Rev 08/18

## 2019-10-26 NOTE — ED TRIAGE NOTES
Cough for over 1 week, negative strep 1 week ago. Cough getting worse. Post nasal drainage Jo Ann Galaviz LPN on 10/26/2019 at 6:52 PM

## 2019-10-27 NOTE — ED PROVIDER NOTES
History     Chief Complaint   Patient presents with     URI     HPI  Jay Carter is a 69 year old male who presents with symptoms for 2 weeks including sore throat, headache, cough.    He has persistent symptoms.  He has feelings of being hot and then chilled.  He has tried a number of OTC meds for congestion as well as gargles without improvement.  Has more of a generalized headache as opposed to a sinus headache.    He did have a throat culture done at another clinic 4 days ago which was negative for strep.    He does not have asthma and has a very remote history of smoking.        Allergies:  Allergies   Allergen Reactions     Ampicillin Rash     Vancomycin Tinnitus     Ciprofloxacin      Adhesive Tape Rash       Problem List:    Patient Active Problem List    Diagnosis Date Noted     Hypertension 08/24/2016     Priority: Medium     Overview:   Enalapril started 11/2013       Hyperlipidemia 08/24/2016     Priority: Medium     Overview:   Pretreatment [id number]/5/2013 Chol 203, TG 90, HDL 41, . Strong maternal FH of CAD.  Lipitor 10 mg started 3/2014.        Obstructive sleep apnea syndrome 08/24/2016     Priority: Medium     Overview:   Initial sleep study 7/2013.  His wife passed away (cancer) in Aug, 2013.  He did not start CPAP at that time.       Major depressive disorder, single episode, mild (H) 02/25/2016     Priority: Medium        Past Medical History:    History reviewed. No pertinent past medical history.    Past Surgical History:    History reviewed. No pertinent surgical history.    Family History:    Family History   Problem Relation Age of Onset     Heart Disease Mother      Myocardial Infarction Brother         before age 65     Bone Cancer Brother      Hypertension Brother      Rheumatoid Arthritis Brother        Social History:  Marital Status:   [5]  Social History     Tobacco Use     Smoking status: Former Smoker     Years: 3.00     Smokeless tobacco: Never Used  "  Substance Use Topics     Alcohol use: Yes     Comment: occasional     Drug use: No        Medications:    atorvastatin (LIPITOR) 10 MG tablet  BuPROPion HCl (WELLBUTRIN XL PO)  enalapril (VASOTEC) 20 MG tablet  hydrochlorothiazide (HYDRODIURIL) 25 MG tablet  Multiple Vitamins-Minerals (GILBERT MULTIVITAMIN FOR MEN) TABS  sildenafil (VIAGRA) 100 MG tablet          Review of Systems    Feverish.  No chest pain.  No vomiting, diarrhea, abdominal pain.  No swelling.  No rashes.        Physical Exam   BP: (!) 146/80  Pulse: 80  Temp: 98.3  F (36.8  C)  Resp: 20  Height: 175.3 cm (5' 9\")  Weight: 93 kg (205 lb)  SpO2: 97 %      Physical Exam    He appears well in general.  NAD.  HEENT: Conjunctiva clear, pharynx not especially inflamed or swollen.  Neck: no adenopathy or thyromegaly.  Chest: Clear to auscultation.  Extremities: No edema.      Results for orders placed or performed during the hospital encounter of 10/26/19   XR Chest 2 Views    Narrative    CHEST TWO VIEWS 10/26/2019 7:14 PM     HISTORY: Cough for two weeks    COMPARISON: None.    FINDINGS: Heart size and pulmonary vascularity are within normal  limits. The lungs are clear. No pneumothorax or pleural effusion.       Impression    IMPRESSION: No radiographic evidence of acute chest abnormality.      ALESSANDRO STUBBS MD   CBC with platelets differential   Result Value Ref Range    WBC 10.8 4.0 - 11.0 10e9/L    RBC Count 4.75 4.4 - 5.9 10e12/L    Hemoglobin 14.7 13.3 - 17.7 g/dL    Hematocrit 43.8 40.0 - 53.0 %    MCV 92 78 - 100 fl    MCH 30.9 26.5 - 33.0 pg    MCHC 33.6 31.5 - 36.5 g/dL    RDW 11.8 10.0 - 15.0 %    Platelet Count 239 150 - 450 10e9/L    Diff Method Automated Method     % Neutrophils 70.1 %    % Lymphocytes 20.4 %    % Monocytes 8.0 %    % Eosinophils 1.0 %    % Basophils 0.3 %    % Immature Granulocytes 0.2 %    Nucleated RBCs 0 0 /100    Absolute Neutrophil 7.6 1.6 - 8.3 10e9/L    Absolute Lymphocytes 2.2 0.8 - 5.3 10e9/L    Absolute " Monocytes 0.9 0.0 - 1.3 10e9/L    Absolute Eosinophils 0.1 0.0 - 0.7 10e9/L    Absolute Basophils 0.0 0.0 - 0.2 10e9/L    Abs Immature Granulocytes 0.0 0 - 0.4 10e9/L    Absolute Nucleated RBC 0.0            ED Course        Procedures               Critical Care time:  none               No results found for this or any previous visit (from the past 24 hour(s)).    Medications - No data to display    Assessments & Plan (with Medical Decision Making)     Patient with persistent cough and malaise.  Work-up did not show pneumonia.  CBC within normal limits.  I suspect he has more of a bronchitis picture here.  He was treated and advised.  He voices understanding of recommendations.          I have reviewed the nursing notes.    I have reviewed the findings, diagnosis, plan and need for follow up with the patient.       New Prescriptions    No medications on file       Final diagnoses:   None       10/26/2019   [facility] EMERGENCY DEPARTMENT     Rashid Mott MD  10/26/19 1952

## 2019-10-27 NOTE — DISCHARGE INSTRUCTIONS
Take prescribed antibiotic and use comfort as directed.    Keep up fluid intake.    Have follow-up for worsening or persistent symptoms.

## 2019-11-06 ENCOUNTER — OFFICE VISIT (OUTPATIENT)
Dept: DERMATOLOGY | Facility: CLINIC | Age: 69
End: 2019-11-06
Payer: COMMERCIAL

## 2019-11-06 VITALS — HEART RATE: 74 BPM | DIASTOLIC BLOOD PRESSURE: 72 MMHG | SYSTOLIC BLOOD PRESSURE: 147 MMHG | OXYGEN SATURATION: 98 %

## 2019-11-06 DIAGNOSIS — L82.1 SEBORRHEIC KERATOSIS: ICD-10-CM

## 2019-11-06 DIAGNOSIS — L81.4 LENTIGO: Primary | ICD-10-CM

## 2019-11-06 DIAGNOSIS — D18.01 ANGIOMA OF SKIN: ICD-10-CM

## 2019-11-06 DIAGNOSIS — D23.9 DERMAL NEVUS: ICD-10-CM

## 2019-11-06 DIAGNOSIS — D48.5 NEOPLASM OF UNCERTAIN BEHAVIOR OF SKIN: ICD-10-CM

## 2019-11-06 DIAGNOSIS — Z85.828 HISTORY OF SKIN CANCER: ICD-10-CM

## 2019-11-06 PROCEDURE — 99203 OFFICE O/P NEW LOW 30 MIN: CPT | Mod: 25 | Performed by: DERMATOLOGY

## 2019-11-06 PROCEDURE — 88342 IMHCHEM/IMCYTCHM 1ST ANTB: CPT | Mod: TC | Performed by: DERMATOLOGY

## 2019-11-06 PROCEDURE — 11102 TANGNTL BX SKIN SINGLE LES: CPT | Performed by: DERMATOLOGY

## 2019-11-06 PROCEDURE — 88305 TISSUE EXAM BY PATHOLOGIST: CPT | Mod: TC | Performed by: DERMATOLOGY

## 2019-11-06 NOTE — LETTER
11/6/2019         RE: Jay Carter  [address] [facility] [address]        Dear Colleague,    Thank you for referring your patient, Jay Carter, to the [facility]. Please see a copy of my visit note below.    Jay Carter is a 69 year old year old male patient here today for spot on left shoulder .  Patient states this has been present for ?.  Patient reports the following symptoms:  Red an dbrown.  Patient reports the following previous treatments none.  Patient reports the following modifying factors none.  Associated symptoms: none.  Patient has no other skin complaints today.  Remainder of the HPI, Meds, PMH, Allergies, FH, and SH was reviewed in chart.    Pertinent Hx:   Basal cell carcinoma   Past Medical History:   Diagnosis Date     Basal cell carcinoma        History reviewed. No pertinent surgical history.     Family History   Problem Relation Age of Onset     Heart Disease Mother      Myocardial Infarction Brother         before age 65     Bone Cancer Brother      Hypertension Brother      Rheumatoid Arthritis Brother        Social History     Socioeconomic History     Marital status:      Spouse name: Not on file     Number of children: Not on file     Years of education: Not on file     Highest education level: Not on file   Occupational History     Not on file   Social Needs     Financial resource strain: Not on file     Food insecurity:     Worry: Not on file     Inability: Not on file     Transportation needs:     Medical: Not on file     Non-medical: Not on file   Tobacco Use     Smoking status: Former Smoker     Packs/day: 0.00     Years: 3.00     Pack years: 0.00     Smokeless tobacco: Never Used   Substance and Sexual Activity     Alcohol use: Yes     Comment: occasional     Drug use: No     Sexual activity: Not on file   Lifestyle     Physical activity:     Days per week: Not on file     Minutes per session: Not on file     Stress: Not  on file   Relationships     Social connections:     Talks on phone: Not on file     Gets together: Not on file     Attends Jainism service: Not on file     Active member of club or organization: Not on file     Attends meetings of clubs or organizations: Not on file     Relationship status: Not on file     Intimate partner violence:     Fear of current or ex partner: Not on file     Emotionally abused: Not on file     Physically abused: Not on file     Forced sexual activity: Not on file   Other Topics Concern     Parent/sibling w/ CABG, MI or angioplasty before 65F 55M? Yes     Comment: brother  of heart disease   Social History Narrative     Not on file       Outpatient Encounter Medications as of 2019   Medication Sig Dispense Refill     atorvastatin (LIPITOR) 10 MG tablet Take 10 mg by mouth       BuPROPion HCl (WELLBUTRIN XL PO) Take 300 mg by mouth daily       enalapril (VASOTEC) 20 MG tablet        guaiFENesin-codeine (ROBITUSSIN AC) 100-10 MG/5ML solution Take 10 mLs by mouth every 4 hours as needed for cough 240 mL 1     hydrochlorothiazide (HYDRODIURIL) 25 MG tablet Take 25 mg by mouth       Multiple Vitamins-Minerals (GILBERT MULTIVITAMIN FOR MEN) TABS Take 1 tablet by mouth daily       sildenafil (VIAGRA) 100 MG tablet Take 100 mg by mouth daily as needed Take 1 tablet by mouth once daily if needed for Erectile Dysfunction. Take 30min to 4 hours before sexual activity. Max 100mg/24hr.       [] azithromycin (ZITHROMAX) 500 MG tablet Take 1 tablet (500 mg) by mouth daily for 5 days 5 tablet 0     No facility-administered encounter medications on file as of 2019.              Review Of Systems  Skin: As above  Eyes: negative  Ears/Nose/Throat: negative  Respiratory: No shortness of breath, dyspnea on exertion, cough, or hemoptysis  Cardiovascular: negative  Gastrointestinal: negative  Genitourinary: negative  Musculoskeletal: negative  Neurologic: negative  Psychiatric:  negative  Hematologic/Lymphatic/Immunologic: negative  Endocrine: negative      O:   NAD, WDWN, Alert & Oriented, Mood & Affect wnl, Vitals stable   Here today alone   BP (!) 147/72   Pulse 74   SpO2 98%    General appearance normal   Vitals stable   Alert, oriented and in no acute distress      Following lymph nodes palpated: Occipital, Cervical, Supraclavicular no lad   L shoulder brown pigmentec maulce with red papule within it      Stuck on papules and brown macules on trunk and ext   Red papules on trunk  Flesh colored papules on trunk     The remainder of the full exam was unremarkable; the following areas were examined:  conjunctiva/lids, oral mucosa, neck, peripheral vascular system, abdomen, lymph nodes, digits/nails, eccrine and apocrine glands, scalp/hair, face, neck, chest, abdomen, buttocks, back, RUE, LUE, RLE, LLE       Eyes: Conjunctivae/lids:Normal     ENT: Lips, buccal mucosa, tongue: normal    MSK:Normal    Cardiovascular: peripheral edema none    Pulm: Breathing Normal    Lymph Nodes: No Head and Neck Lymphadenopathy     Neuro/Psych: Orientation:Normal; Mood/Affect:Normal      A/P:  1. Seborrheic keratosis, lentigo, angioma, dermal nevus, hx of non-melanoma skin cancer   2. L shoulder r/o atypical nevus  TANGENTIAL BIOPSY SENT OUT:  After consent, anesthesia with LEC and prep, tangential excision performed and specimen sent out for permanent section histology.  No complications and routine wound care. Patient told to call our office in 1-2 weeks for result.        BENIGN LESIONS DISCUSSED WITH PATIENT:  I discussed the specifics of tumor, prognosis, and genetics of benign lesions.  I explained that treatment of these lesions would be purely cosmetic and not medically neccessary.  I discussed with patient different removal options including excision, cautery and /or laser.      Nature and genetics of benign skin lesions dicussed with patient.  Signs and Symptoms of skin cancer discussed with  patient.  Patient encouraged to perform monthly skin exams.  UV precautions reviewed with patient.  Skin care regimen reviewed with patient: Eliminate harsh soaps, i.e. Dial, zest, irsih spring; Mild soaps such as Cetaphil or Dove sensitive skin, avoid hot or cold showers, aggressive use of emollients including vanicream, cetaphil or cerave discussed with patient.    Risks of non-melanoma skin cancer discussed with patient   Return to clinic pending path       Again, thank you for allowing me to participate in the care of your patient.        Sincerely,        David Davis MD

## 2019-11-06 NOTE — PATIENT INSTRUCTIONS
Wound Care Instructions     FOR SUPERFICIAL WOUNDS     [facility] [phone]    [facility] [phone]                       AFTER 24 HOURS YOU SHOULD REMOVE THE BANDAGE AND BEGIN DAILY DRESSING CHANGES AS FOLLOWS:     1) Remove Dressing.     2) Clean and dry the area with tap water using a Q-tip or sterile gauze pad.     3) Apply Vaseline, Aquaphor, Polysporin ointment or Bacitracin ointment over entire wound.  Do NOT use Neosporin ointment.     4) Cover the wound with a band-aid, or a sterile non-stick gauze pad and micropore paper tape      REPEAT THESE INSTRUCTIONS AT LEAST ONCE A DAY UNTIL THE WOUND HAS COMPLETELY HEALED.    It is an old wives tale that a wound heals better when it is exposed to air and allowed to dry out. The wound will heal faster with a better cosmetic result if it is kept moist with ointment and covered with a bandage.    **Do not let the wound dry out.**      Supplies Needed:      *Cotton tipped applicators (Q-tips)    *Polysporin Ointment or Bacitracin Ointment (NOT NEOSPORIN)    *Band-aids or non-stick gauze pads and micropore paper tape.      PATIENT INFORMATION:    During the healing process you will notice a number of changes. All wounds develop a small halo of redness surrounding the wound.  This means healing is occurring. Severe itching with extensive redness usually indicates sensitivity to the ointment or bandage tape used to dress the wound.  You should call our office if this develops.      Swelling  and/or discoloration around your surgical site is common, particularly when performed around the eye.    All wounds normally drain.  The larger the wound the more drainage there will be.  After 7-10 days, you will notice the wound beginning to shrink and new skin will begin to grow.  The wound is healed when you can see skin has formed over the entire area.  A healed wound has a healthy, shiny look to the surface and is red to dark pink in color  to normalize.  Wounds may take approximately 4-6 weeks to heal.  Larger wounds may take 6-8 weeks.  After the wound is healed you may discontinue dressing changes.    You may experience a sensation of tightness as your wound heals. This is normal and will gradually subside.    Your healed wound may be sensitive to temperature changes. This sensitivity improves with time, but if you re having a lot of discomfort, try to avoid temperature extremes.    Patients frequently experience itching after their wound appears to have healed because of the continue healing under the skin.  Plain Vaseline will help relieve the itching.        POSSIBLE COMPLICATIONS    BLEEDIN. Leave the bandage in place.  2. Use tightly rolled up gauze or a cloth to apply direct pressure over the bandage for 30  minutes.  3. Reapply pressure for an additional 30 minutes if necessary  4. Use additional gauze and tape to maintain pressure once the bleeding has stopped.

## 2019-11-06 NOTE — PROGRESS NOTES
Jay Carter is a 69 year old year old male patient here today for spot on left shoulder .  Patient states this has been present for ?.  Patient reports the following symptoms:  Red an dbrown.  Patient reports the following previous treatments none.  Patient reports the following modifying factors none.  Associated symptoms: none.  Patient has no other skin complaints today.  Remainder of the HPI, Meds, PMH, Allergies, FH, and SH was reviewed in chart.    Pertinent Hx:   Basal cell carcinoma   Past Medical History:   Diagnosis Date     Basal cell carcinoma        History reviewed. No pertinent surgical history.     Family History   Problem Relation Age of Onset     Heart Disease Mother      Myocardial Infarction Brother         before age 65     Bone Cancer Brother      Hypertension Brother      Rheumatoid Arthritis Brother        Social History     Socioeconomic History     Marital status:      Spouse name: Not on file     Number of children: Not on file     Years of education: Not on file     Highest education level: Not on file   Occupational History     Not on file   Social Needs     Financial resource strain: Not on file     Food insecurity:     Worry: Not on file     Inability: Not on file     Transportation needs:     Medical: Not on file     Non-medical: Not on file   Tobacco Use     Smoking status: Former Smoker     Packs/day: 0.00     Years: 3.00     Pack years: 0.00     Smokeless tobacco: Never Used   Substance and Sexual Activity     Alcohol use: Yes     Comment: occasional     Drug use: No     Sexual activity: Not on file   Lifestyle     Physical activity:     Days per week: Not on file     Minutes per session: Not on file     Stress: Not on file   Relationships     Social connections:     Talks on phone: Not on file     Gets together: Not on file     Attends Latter day service: Not on file     Active member of club or organization: Not on file     Attends meetings of clubs or organizations:  Not on file     Relationship status: Not on file     Intimate partner violence:     Fear of current or ex partner: Not on file     Emotionally abused: Not on file     Physically abused: Not on file     Forced sexual activity: Not on file   Other Topics Concern     Parent/sibling w/ CABG, MI or angioplasty before 65F 55M? Yes     Comment: brother  of heart disease   Social History Narrative     Not on file       Outpatient Encounter Medications as of 2019   Medication Sig Dispense Refill     atorvastatin (LIPITOR) 10 MG tablet Take 10 mg by mouth       BuPROPion HCl (WELLBUTRIN XL PO) Take 300 mg by mouth daily       enalapril (VASOTEC) 20 MG tablet        guaiFENesin-codeine (ROBITUSSIN AC) 100-10 MG/5ML solution Take 10 mLs by mouth every 4 hours as needed for cough 240 mL 1     hydrochlorothiazide (HYDRODIURIL) 25 MG tablet Take 25 mg by mouth       Multiple Vitamins-Minerals (GILBERT MULTIVITAMIN FOR MEN) TABS Take 1 tablet by mouth daily       sildenafil (VIAGRA) 100 MG tablet Take 100 mg by mouth daily as needed Take 1 tablet by mouth once daily if needed for Erectile Dysfunction. Take 30min to 4 hours before sexual activity. Max 100mg/24hr.       [] azithromycin (ZITHROMAX) 500 MG tablet Take 1 tablet (500 mg) by mouth daily for 5 days 5 tablet 0     No facility-administered encounter medications on file as of 2019.              Review Of Systems  Skin: As above  Eyes: negative  Ears/Nose/Throat: negative  Respiratory: No shortness of breath, dyspnea on exertion, cough, or hemoptysis  Cardiovascular: negative  Gastrointestinal: negative  Genitourinary: negative  Musculoskeletal: negative  Neurologic: negative  Psychiatric: negative  Hematologic/Lymphatic/Immunologic: negative  Endocrine: negative      O:   NAD, WDWN, Alert & Oriented, Mood & Affect wnl, Vitals stable   Here today alone   BP (!) 147/72   Pulse 74   SpO2 98%    General appearance normal   Vitals stable   Alert, oriented and  in no acute distress      Following lymph nodes palpated: Occipital, Cervical, Supraclavicular no lad   L shoulder brown pigmentec maulce with red papule within it      Stuck on papules and brown macules on trunk and ext   Red papules on trunk  Flesh colored papules on trunk     The remainder of the full exam was unremarkable; the following areas were examined:  conjunctiva/lids, oral mucosa, neck, peripheral vascular system, abdomen, lymph nodes, digits/nails, eccrine and apocrine glands, scalp/hair, face, neck, chest, abdomen, buttocks, back, RUE, LUE, RLE, LLE       Eyes: Conjunctivae/lids:Normal     ENT: Lips, buccal mucosa, tongue: normal    MSK:Normal    Cardiovascular: peripheral edema none    Pulm: Breathing Normal    Lymph Nodes: No Head and Neck Lymphadenopathy     Neuro/Psych: Orientation:Normal; Mood/Affect:Normal      A/P:  1. Seborrheic keratosis, lentigo, angioma, dermal nevus, hx of non-melanoma skin cancer   2. L shoulder r/o atypical nevus  TANGENTIAL BIOPSY SENT OUT:  After consent, anesthesia with LEC and prep, tangential excision performed and specimen sent out for permanent section histology.  No complications and routine wound care. Patient told to call our office in 1-2 weeks for result.        BENIGN LESIONS DISCUSSED WITH PATIENT:  I discussed the specifics of tumor, prognosis, and genetics of benign lesions.  I explained that treatment of these lesions would be purely cosmetic and not medically neccessary.  I discussed with patient different removal options including excision, cautery and /or laser.      Nature and genetics of benign skin lesions dicussed with patient.  Signs and Symptoms of skin cancer discussed with patient.  Patient encouraged to perform monthly skin exams.  UV precautions reviewed with patient.  Skin care regimen reviewed with patient: Eliminate harsh soaps, i.e. Dial, zest, irsih spring; Mild soaps such as Cetaphil or Dove sensitive skin, avoid hot or cold showers,  aggressive use of emollients including vanicream, cetaphil or cerave discussed with patient.    Risks of non-melanoma skin cancer discussed with patient   Return to clinic pending path

## 2019-11-11 LAB — COPATH REPORT: NORMAL

## 2019-11-13 ENCOUNTER — OFFICE VISIT (OUTPATIENT)
Dept: DERMATOLOGY | Facility: CLINIC | Age: 69
End: 2019-11-13
Payer: COMMERCIAL

## 2019-11-13 VITALS — HEART RATE: 86 BPM | OXYGEN SATURATION: 98 % | SYSTOLIC BLOOD PRESSURE: 116 MMHG | DIASTOLIC BLOOD PRESSURE: 69 MMHG

## 2019-11-13 DIAGNOSIS — D03.62 MELANOMA IN SITU OF LEFT SHOULDER (H): Primary | ICD-10-CM

## 2019-11-13 PROCEDURE — 17314 MOHS ADDL STAGE T/A/L: CPT | Performed by: DERMATOLOGY

## 2019-11-13 PROCEDURE — 17313 MOHS 1 STAGE T/A/L: CPT | Performed by: DERMATOLOGY

## 2019-11-13 PROCEDURE — 88342 IMHCHEM/IMCYTCHM 1ST ANTB: CPT | Mod: 59 | Performed by: DERMATOLOGY

## 2019-11-13 PROCEDURE — 88341 IMHCHEM/IMCYTCHM EA ADD ANTB: CPT | Performed by: DERMATOLOGY

## 2019-11-13 PROCEDURE — 13121 CMPLX RPR S/A/L 2.6-7.5 CM: CPT | Mod: 51 | Performed by: DERMATOLOGY

## 2019-11-13 NOTE — PATIENT INSTRUCTIONS
Sutured Wound Care     [facility]: [phone]    [facility]: [phone]    Left shoulder      ? No strenuous activity for 48 hours. Resume moderate activity in 48 hours. No heavy exercising until you are seen for follow up in one week.     ? Take Tylenol as needed for discomfort.                         ? Do not drink alcoholic beverages for 48 hours.     ? Keep the pressure bandage in place for 24 hours. If the bandage becomes blood tinged or loose, reinforce it with gauze and tape.        (Refer to the reverse side of this page for management of bleeding).    ? Remove pressure bandage in 24 hours     ? Leave the flat bandage in place until your follow up appointment.    ? Keep the bandage dry. Wash around it carefully.    ? If the tape becomes soiled or starts to come off, reinforce it with additional paper tape.    ? Do not smoke for 3 weeks; smoking is detrimental to wound healing.    ? It is normal to have swelling and bruising around the surgical site. The bruising will fade in approximately 10-14 days. Elevate the area to reduce swelling.    ? Numbness, itchiness and sensitivity to temperature changes can occur after surgery and may take up to 18 months to normalize.      POSSIBLE COMPLICATIONS    BLEEDIN. Leave the bandage in place.  2. Use tightly rolled up gauze or a cloth to apply direct pressure over the bandage for 20   minutes.  3. Reapply pressure for an additional 20 minutes if necessary  4. Call the office or go to the nearest emergency room if pressure fails to stop the bleeding.  5. Use additional gauze and tape to maintain pressure once the bleeding has stopped.        PAIN:    1. Post operative pain should slowly get better, never worse.  2. A severe increase in pain may indicate a problem. Call the office if this occurs.    In case of emergency phone:Dr Davis [phone]

## 2019-11-13 NOTE — LETTER
11/13/2019         RE: Jay Carter  [address] [id number]        Dear Colleague,    Thank you for referring your patient, Jay Carter, to the [facility]. Please see a copy of my visit note below.    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 Patient reports the following modifying factors none.  Associated symptoms: none.  Patient has no other skin complaints today.  Remainder of the HPI, Meds, PMH, Allergies, FH, and SH was reviewed in chart.      Past Medical History:   Diagnosis Date     Basal cell carcinoma        History reviewed. No pertinent surgical history.     Family History   Problem Relation Age of Onset     Heart Disease Mother      Myocardial Infarction Brother         before age 65     Bone Cancer Brother      Hypertension Brother      Rheumatoid Arthritis Brother        Social History     Socioeconomic History     Marital status:      Spouse name: Not on file     Number of children: Not on file     Years of education: Not on file     Highest education level: Not on file   Occupational History     Not on file   Social Needs     Financial resource strain: Not on file     Food insecurity:     Worry: Not on file     Inability: Not on file     Transportation needs:     Medical: Not on file     Non-medical: Not on file   Tobacco Use     Smoking status: Former Smoker     Packs/day: 0.00     Years: 3.00     Pack years: 0.00     Smokeless tobacco: Never Used   Substance and Sexual Activity     Alcohol use: Yes     Comment: occasional     Drug use: No     Sexual activity: Not on file   Lifestyle     Physical activity:     Days per week: Not on file     Minutes per session: Not on file     Stress: Not on file   Relationships     Social connections:      Talks on phone: Not on file     Gets together: Not on file     Attends Congregation service: Not on file     Active member of club or organization: Not on file     Attends meetings of clubs or organizations: Not on file     Relationship status: Not on file     Intimate partner violence:     Fear of current or ex partner: Not on file     Emotionally abused: Not on file     Physically abused: Not on file     Forced sexual activity: Not on file   Other Topics Concern     Parent/sibling w/ CABG, MI or angioplasty before 65F 55M? Yes     Comment: brother  of heart disease   Social History Narrative     Not on file       Outpatient Encounter Medications as of 2019   Medication Sig Dispense Refill     atorvastatin (LIPITOR) 10 MG tablet Take 10 mg by mouth       BuPROPion HCl (WELLBUTRIN XL PO) Take 300 mg by mouth daily       enalapril (VASOTEC) 20 MG tablet        guaiFENesin-codeine (ROBITUSSIN AC) 100-10 MG/5ML solution Take 10 mLs by mouth every 4 hours as needed for cough 240 mL 1     hydrochlorothiazide (HYDRODIURIL) 25 MG tablet Take 25 mg by mouth       Multiple Vitamins-Minerals (GILBERT MULTIVITAMIN FOR MEN) TABS Take 1 tablet by mouth daily       sildenafil (VIAGRA) 100 MG tablet Take 100 mg by mouth daily as needed Take 1 tablet by mouth once daily if needed for Erectile Dysfunction. Take 30min to 4 hours before sexual activity. Max 100mg/24hr.       [] azithromycin (ZITHROMAX) 500 MG tablet Take 1 tablet (500 mg) by mouth daily for 5 days 5 tablet 0     No facility-administered encounter medications on file as of 2019.              Review Of Systems  Skin: As above  Eyes: negative  Ears/Nose/Throat: negative  Respiratory: No shortness of breath, dyspnea on exertion, cough, or hemoptysis  Cardiovascular: negative  Gastrointestinal: negative  Genitourinary: negative  Musculoskeletal: negative  Neurologic: negative  Psychiatric: negative  Hematologic/Lymphatic/Immunologic: negative  Endocrine:  negative      O:   NAD, WDWN, Alert & Oriented, Mood & Affect wnl, Vitals stable   Here today alone   /69   Pulse 86   SpO2 98%    General appearance normal   Vitals stable   Alert, oriented and in no acute distress      Following lymph nodes palpated: Occipital, Cervical, Supraclavicular , axilla no lad   L ant shoulder 1.6cm red plaque      Eyes: Conjunctivae/lids:Normal     ENT: Lips, buccal mucosa, tongue: normal    MSK:Normal    Cardiovascular: peripheral edema none    Pulm: Breathing Normal    Lymph Nodes: No Head and Neck Lymphadenopathy     Neuro/Psych: Orientation:Normal; Mood/Affect:Normal      A/P:  1. L shoulder melanoma in situ   MELANOMA DISCUSSED WITH PATIENT:  I discussed the specifics of tumor, prognosis, metachronous melanoma, self exam, and genetics with the patient. I explained the need for monthly skin exams including and taught the patient how to do this. Patient was asked about new or changing moles . I discussed with patient signs and symptoms that could arise in the setting of recurrent locoregional or metastatic disease. In addition, the need to undergo every 4 month dermatologic full skin survey and evaluation given that patients with a diagnosis of melanoma are at risk of recurrence (local and distant) and of subsequent de juwan melanoma.  . I reviewed treatment options, including a discussion of wide excision (the gold standard) versus Mohs surgery with MART-1 immunostains.     Note: MART-1 (Melanoma Antigen Recognized by T-cells) antibody immunostaining was used during Mohs surgery as per standard protocol, in addition to routine processing of all specimens with hematoxylin and eosin. The peripheral margins/edges were processed with the MART-1 stain (5 specimens total). The center was examined with hematoxylin & eosin and MART-1 immunostains. The patient was informed of the procedure and its risk/benefits during the consent for the procedure.    One or more of the reagents used  in immunohistochemical testing in this case may not have been cleared or approved by the U.S. Food and Drug Administration (FDA). The FDA has determined that such clearance or approval is not necessary. These tests are used for clinical purposes. They should not be regarded as investigational or for research. These reagents  performance characteristics have been determined by [facility]. This laboratory is certified under the Clinical Laboratory Improvement Amendments of 1988 (CLIA-88) as qualified to perform high complexity clinical laboratory testing.    After [facility] discussed with patient, decision for Mohs surgery was made. Indication for Mohs was aggressive histology. Patient confirmed the site with Dr. Davis. After anesthesia with LEC, the tumor was excised using standard Mohs technique in 2 stages(s).  MART 1 stains were performed on 5 specimens. CLEAR MARGINS OBTAINED and Final defect size was 2.9 x 2.4 cm.   REPAIR COMPLEX: Because of the tightness of the surrounding skin and Because of the size and full thickness nature of the defect, a complex closure was planned. After LEC anesthesia and prep, Burow's triangles were excised in the relaxed skin tension lines. The wound edges were widely undermined by dissection in the subcutaneous plane until adequate tissue mobility was obtained. Hemostasis was obtained. The wound edges were closed in a layered fashion using Vicryl and Fast Absorbing Plain Gut sutures. Postoperative length was 6 cm.   EBL minimal; complications none; wound care routine.  The patient was discharged in good condition and will return in one week for wound evaluation.  BENIGN LESIONS DISCUSSED WITH PATIENT:  I discussed the specifics of tumor, prognosis, and genetics of benign lesions.  I explained that treatment of these lesions would be purely cosmetic and not medically neccessary.  I discussed with patient different removal options including excision, cautery and /or  laser.      Nature and genetics of benign skin lesions dicussed with patient.  Signs and Symptoms of skin cancer discussed with patient.  ABCDEs of melanoma reviewed with patient.  Patient encouraged to perform monthly skin exams.  UV precautions reviewed with patient.  Skin care regimen reviewed with patient: Eliminate harsh soaps, i.e. Dial, zest, irsih spring; Mild soaps such as Cetaphil or Dove sensitive skin, avoid hot or cold showers, aggressive use of emollients including vanicream, cetaphil or cerave discussed with patient.    Risks of non-melanoma skin cancer discussed with patient   Return to clinic 6 months      Again, thank you for allowing me to participate in the care of your patient.        Sincerely,        David Davis MD

## 2019-11-13 NOTE — PROGRESS NOTES
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 Patient reports the following modifying factors none.  Associated symptoms: none.  Patient has no other skin complaints today.  Remainder of the HPI, Meds, PMH, Allergies, FH, and SH was reviewed in chart.      Past Medical History:   Diagnosis Date     Basal cell carcinoma        History reviewed. No pertinent surgical history.     Family History   Problem Relation Age of Onset     Heart Disease Mother      Myocardial Infarction Brother         before age 65     Bone Cancer Brother      Hypertension Brother      Rheumatoid Arthritis Brother        Social History     Socioeconomic History     Marital status:      Spouse name: Not on file     Number of children: Not on file     Years of education: Not on file     Highest education level: Not on file   Occupational History     Not on file   Social Needs     Financial resource strain: Not on file     Food insecurity:     Worry: Not on file     Inability: Not on file     Transportation needs:     Medical: Not on file     Non-medical: Not on file   Tobacco Use     Smoking status: Former Smoker     Packs/day: 0.00     Years: 3.00     Pack years: 0.00     Smokeless tobacco: Never Used   Substance and Sexual Activity     Alcohol use: Yes     Comment: occasional     Drug use: No     Sexual activity: Not on file   Lifestyle     Physical activity:     Days per week: Not on file     Minutes per session: Not on file     Stress: Not on file   Relationships     Social connections:     Talks on phone: Not on file     Gets together: Not on file     Attends Congregation service: Not on file     Active member of club or organization: Not on file     Attends meetings of clubs or organizations: Not on file     Relationship status: Not on file     Intimate partner violence:     Fear of current or ex partner: Not on file     Emotionally abused: Not on  file     Physically abused: Not on file     Forced sexual activity: Not on file   Other Topics Concern     Parent/sibling w/ CABG, MI or angioplasty before 65F 55M? Yes     Comment: brother  of heart disease   Social History Narrative     Not on file       Outpatient Encounter Medications as of 2019   Medication Sig Dispense Refill     atorvastatin (LIPITOR) 10 MG tablet Take 10 mg by mouth       BuPROPion HCl (WELLBUTRIN XL PO) Take 300 mg by mouth daily       enalapril (VASOTEC) 20 MG tablet        guaiFENesin-codeine (ROBITUSSIN AC) 100-10 MG/5ML solution Take 10 mLs by mouth every 4 hours as needed for cough 240 mL 1     hydrochlorothiazide (HYDRODIURIL) 25 MG tablet Take 25 mg by mouth       Multiple Vitamins-Minerals (GILBERT MULTIVITAMIN FOR MEN) TABS Take 1 tablet by mouth daily       sildenafil (VIAGRA) 100 MG tablet Take 100 mg by mouth daily as needed Take 1 tablet by mouth once daily if needed for Erectile Dysfunction. Take 30min to 4 hours before sexual activity. Max 100mg/24hr.       [] azithromycin (ZITHROMAX) 500 MG tablet Take 1 tablet (500 mg) by mouth daily for 5 days 5 tablet 0     No facility-administered encounter medications on file as of 2019.              Review Of Systems  Skin: As above  Eyes: negative  Ears/Nose/Throat: negative  Respiratory: No shortness of breath, dyspnea on exertion, cough, or hemoptysis  Cardiovascular: negative  Gastrointestinal: negative  Genitourinary: negative  Musculoskeletal: negative  Neurologic: negative  Psychiatric: negative  Hematologic/Lymphatic/Immunologic: negative  Endocrine: negative      O:   NAD, WDWN, Alert & Oriented, Mood & Affect wnl, Vitals stable   Here today alone   /69   Pulse 86   SpO2 98%    General appearance normal   Vitals stable   Alert, oriented and in no acute distress      Following lymph nodes palpated: Occipital, Cervical, Supraclavicular , axilla no lad   L ant shoulder 1.6cm red plaque      Eyes:  Conjunctivae/lids:Normal     ENT: Lips, buccal mucosa, tongue: normal    MSK:Normal    Cardiovascular: peripheral edema none    Pulm: Breathing Normal    Lymph Nodes: No Head and Neck Lymphadenopathy     Neuro/Psych: Orientation:Normal; Mood/Affect:Normal      A/P:  1. L shoulder melanoma in situ   MELANOMA DISCUSSED WITH PATIENT:  I discussed the specifics of tumor, prognosis, metachronous melanoma, self exam, and genetics with the patient. I explained the need for monthly skin exams including and taught the patient how to do this. Patient was asked about new or changing moles . I discussed with patient signs and symptoms that could arise in the setting of recurrent locoregional or metastatic disease. In addition, the need to undergo every 4 month dermatologic full skin survey and evaluation given that patients with a diagnosis of melanoma are at risk of recurrence (local and distant) and of subsequent de juwan melanoma.  . I reviewed treatment options, including a discussion of wide excision (the gold standard) versus Mohs surgery with MART-1 immunostains.     Note: MART-1 (Melanoma Antigen Recognized by T-cells) antibody immunostaining was used during Mohs surgery as per standard protocol, in addition to routine processing of all specimens with hematoxylin and eosin. The peripheral margins/edges were processed with the MART-1 stain (5 specimens total). The center was examined with hematoxylin & eosin and MART-1 immunostains. The patient was informed of the procedure and its risk/benefits during the consent for the procedure.    One or more of the reagents used in immunohistochemical testing in this case may not have been cleared or approved by the U.S. Food and Drug Administration (FDA). The FDA has determined that such clearance or approval is not necessary. These tests are used for clinical purposes. They should not be regarded as investigational or for research. These reagents  performance characteristics have  been determined by [facility]. This laboratory is certified under the Clinical Laboratory Improvement Amendments of 1988 (CLIA-88) as qualified to perform high complexity clinical laboratory testing.    After [facility] discussed with patient, decision for Mohs surgery was made. Indication for Mohs was aggressive histology. Patient confirmed the site with Dr. Davis. After anesthesia with LEC, the tumor was excised using standard Mohs technique in 2 stages(s).  MART 1 stains were performed on 5 specimens. CLEAR MARGINS OBTAINED and Final defect size was 2.9 x 2.4 cm.   REPAIR COMPLEX: Because of the tightness of the surrounding skin and Because of the size and full thickness nature of the defect, a complex closure was planned. After LEC anesthesia and prep, Burow's triangles were excised in the relaxed skin tension lines. The wound edges were widely undermined by dissection in the subcutaneous plane until adequate tissue mobility was obtained. Hemostasis was obtained. The wound edges were closed in a layered fashion using Vicryl and Fast Absorbing Plain Gut sutures. Postoperative length was 6 cm.   EBL minimal; complications none; wound care routine.  The patient was discharged in good condition and will return in one week for wound evaluation.  BENIGN LESIONS DISCUSSED WITH PATIENT:  I discussed the specifics of tumor, prognosis, and genetics of benign lesions.  I explained that treatment of these lesions would be purely cosmetic and not medically neccessary.  I discussed with patient different removal options including excision, cautery and /or laser.      Nature and genetics of benign skin lesions dicussed with patient.  Signs and Symptoms of skin cancer discussed with patient.  ABCDEs of melanoma reviewed with patient.  Patient encouraged to perform monthly skin exams.  UV precautions reviewed with patient.  Skin care regimen reviewed with patient: Eliminate harsh soaps, i.e. Dial, zest, irsih  spring; Mild soaps such as Cetaphil or Dove sensitive skin, avoid hot or cold showers, aggressive use of emollients including vanicream, cetaphil or cerave discussed with patient.    Risks of non-melanoma skin cancer discussed with patient   Return to clinic 6 months

## 2019-11-19 ENCOUNTER — ALLIED HEALTH/NURSE VISIT (OUTPATIENT)
Dept: DERMATOLOGY | Facility: CLINIC | Age: 69
End: 2019-11-19
Payer: COMMERCIAL

## 2019-11-19 DIAGNOSIS — Z48.01 ENCOUNTER FOR CHANGE OR REMOVAL OF SURGICAL WOUND DRESSING: Primary | ICD-10-CM

## 2019-11-19 PROCEDURE — 99207 ZZC NO CHARGE NURSE ONLY: CPT

## 2019-11-19 NOTE — PROGRESS NOTES
Pt returned to clinic for post surgery 1 week follow up bandage change. Pt has no complaints, denies pain. Bandage removed from left shoulder, area cleansed with normal saline. Site is healing and wound edges approximating well. Reapplied new steri strips and paper tape.    Advised to watch for signs/sx of infection; spreading redness, drainage, odor, fever. Call or report promptly to clinic. Pt given written instructions and informed to rtc as needed. Patient verbalized understanding.     Anjelica Lucas, CMA

## 2020-05-27 ENCOUNTER — OFFICE VISIT (OUTPATIENT)
Dept: DERMATOLOGY | Facility: CLINIC | Age: 70
End: 2020-05-27
Payer: COMMERCIAL

## 2020-05-27 VITALS — HEART RATE: 84 BPM | SYSTOLIC BLOOD PRESSURE: 127 MMHG | DIASTOLIC BLOOD PRESSURE: 79 MMHG | RESPIRATION RATE: 16 BRPM

## 2020-05-27 DIAGNOSIS — D18.01 CHERRY ANGIOMA: ICD-10-CM

## 2020-05-27 DIAGNOSIS — Z85.828 HISTORY OF BASAL CELL CANCER: ICD-10-CM

## 2020-05-27 DIAGNOSIS — L81.4 LENTIGO: ICD-10-CM

## 2020-05-27 DIAGNOSIS — D22.9 MULTIPLE BENIGN NEVI: ICD-10-CM

## 2020-05-27 DIAGNOSIS — L82.1 SEBORRHEIC KERATOSIS: ICD-10-CM

## 2020-05-27 DIAGNOSIS — L23.1 ALLERGIC CONTACT DERMATITIS DUE TO ADHESIVES: Primary | ICD-10-CM

## 2020-05-27 DIAGNOSIS — Z86.006 HISTORY OF MELANOMA IN SITU: ICD-10-CM

## 2020-05-27 PROCEDURE — 99213 OFFICE O/P EST LOW 20 MIN: CPT | Performed by: PHYSICIAN ASSISTANT

## 2020-05-27 RX ORDER — BETAMETHASONE DIPROPIONATE 0.5 MG/G
CREAM TOPICAL
Qty: 45 G | Refills: 5 | Status: ON HOLD | OUTPATIENT
Start: 2020-05-27 | End: 2021-08-24

## 2020-05-27 NOTE — PROGRESS NOTES
Jay Carter is a 69 year old year old male patient here today for redness itching on scar where melanoma in situ was removed. He denies any other concerns today.   Patient has no other skin complaints today.  Remainder of the HPI, Meds, PMH, Allergies, FH, and SH was reviewed in chart.    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 No pertinent surgical history.     Family History   Problem Relation Age of Onset     Heart Disease Mother      Myocardial Infarction Brother         before age 65     Bone Cancer Brother      Hypertension Brother      Rheumatoid Arthritis Brother        Social History     Socioeconomic History     Marital status:      Spouse name: Not on file     Number of children: Not on file     Years of education: Not on file     Highest education level: Not on file   Occupational History     Not on file   Social Needs     Financial resource strain: Not on file     Food insecurity     Worry: Not on file     Inability: Not on file     Transportation needs     Medical: Not on file     Non-medical: Not on file   Tobacco Use     Smoking status: Former Smoker     Packs/day: 0.00     Years: 3.00     Pack years: 0.00     Smokeless tobacco: Never Used   Substance and Sexual Activity     Alcohol use: Yes     Comment: occasional     Drug use: No     Sexual activity: Not on file   Lifestyle     Physical activity     Days per week: Not on file     Minutes per session: Not on file     Stress: Not on file   Relationships     Social connections     Talks on phone: Not on file     Gets together: Not on file     Attends Taoism service: Not on file     Active member of club or organization: Not on file     Attends meetings of clubs or organizations: Not on file     Relationship status: Not on file     Intimate partner violence     Fear of current or ex partner: Not on file      Emotionally abused: Not on file     Physically abused: Not on file     Forced sexual activity: Not on file   Other Topics Concern     Parent/sibling w/ CABG, MI or angioplasty before 65F 55M? Yes     Comment: brother  of heart disease   Social History Narrative     Not on file       Outpatient Encounter Medications as of 2020   Medication Sig Dispense Refill     atorvastatin (LIPITOR) 10 MG tablet Take 10 mg by mouth       betamethasone dipropionate (DIPROSONE) 0.05 % external cream Apply twice daily as needed to rash on back/shoulder for for 3-4 weeks. 45 g 5     BuPROPion HCl (WELLBUTRIN XL PO) Take 300 mg by mouth daily       enalapril (VASOTEC) 20 MG tablet        hydrochlorothiazide (HYDRODIURIL) 25 MG tablet Take 25 mg by mouth       Multiple Vitamins-Minerals (GILBERT MULTIVITAMIN FOR MEN) TABS Take 1 tablet by mouth daily       guaiFENesin-codeine (ROBITUSSIN AC) 100-10 MG/5ML solution Take 10 mLs by mouth every 4 hours as needed for cough (Patient not taking: Reported on 2020) 240 mL 1     sildenafil (VIAGRA) 100 MG tablet Take 100 mg by mouth daily as needed Take 1 tablet by mouth once daily if needed for Erectile Dysfunction. Take 30min to 4 hours before sexual activity. Max 100mg/24hr.       No facility-administered encounter medications on file as of 2020.              Review Of Systems  Skin: As above  Eyes: negative  Ears/Nose/Throat: negative  Respiratory: No shortness of breath, dyspnea on exertion, cough, or hemoptysis  Cardiovascular: negative  Gastrointestinal: negative  Genitourinary: negative  Musculoskeletal: negative  Neurologic: negative  Psychiatric: negative  Hematologic/Lymphatic/Immunologic: negative  Endocrine: negative      O:   NAD, WDWN, Alert & Oriented, Mood & Affect wnl, Vitals stable   Here today alone   /79   Pulse 84   Resp 16    General appearance normal   Vitals stable   Alert, oriented and in no acute distress     Pink scaly  Plaques on left shoulder  and back   Stuck on papules and brown macules on trunk and ext   Red papules on trunk  Brown papules and macules with regular pigment network and borders  Well healed scar on left shoulder  The remainder of skin exam is normal     Eyes: Conjunctivae/lids:Normal     ENT: Lips: normal    MSK:Normal    Pulm: Breathing Normal    Lymph Nodes: No Head and Neck and Axillary Lymphadenopathy     Neuro/Psych: Orientation:Alert and Orientedx3 ; Mood/Affect:normal  A/P:  1. 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 He has not been using.   Apply betamethasone cream twice daily as needed.   Use cetaphil lotion daily.   2. History of MIS on left shoulder  MELANOMA DISCUSSED WITH PATIENT:  I discussed the specifics of tumor, prognosis, metachronous melanoma, self exam, and genetics with the patient. I explained the need for monthly skin exams including and taught the patient how to do this. Patient was asked about new or changing moles . I discussed with patient signs and symptoms that could arise in the setting of recurrent locoregional or metastatic disease. In addition, the need to undergo every 4 month dermatologic full skin survey and evaluation given that patients with a diagnosis of melanoma are at risk of recurrence (local and distant) and of subsequent de juwan melanoma.    3. Seborrheic keratosis, lentigo, angioma, benign nevi, History of BCC   BENIGN LESIONS DISCUSSED WITH PATIENT:  I discussed the specifics of tumor, prognosis, and genetics of benign lesions.  I explained that treatment of these lesions would be purely cosmetic and not medically neccessary.  I discussed with patient different removal options including excision, cautery and /or laser.      Nature and genetics of benign skin lesions dicussed with patient.  Signs and Symptoms of skin cancer discussed with patient.  ABCDEs of melanoma reviewed with patient.  Patient encouraged to perform  monthly skin exams.  UV precautions reviewed with patient.   Risks of non-melanoma skin cancer discussed with patient   Return to clinic in 6 months.

## 2020-05-27 NOTE — LETTER
5/27/2020         RE: Jay Carter  [address] [facility] [address]        Dear Colleague,    Thank you for referring your patient, Jay Carter, to the [facility]. Please see a copy of my visit note below.    Jay Carter is a 69 year old year old male patient here today for redness itching on scar where melanoma in situ was removed. He denies any other concerns today.   Patient has no other skin complaints today.  Remainder of the HPI, Meds, PMH, Allergies, FH, and SH was reviewed in chart.    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 No pertinent surgical history.     Family History   Problem Relation Age of Onset     Heart Disease Mother      Myocardial Infarction Brother         before age 65     Bone Cancer Brother      Hypertension Brother      Rheumatoid Arthritis Brother        Social History     Socioeconomic History     Marital status:      Spouse name: Not on file     Number of children: Not on file     Years of education: Not on file     Highest education level: Not on file   Occupational History     Not on file   Social Needs     Financial resource strain: Not on file     Food insecurity     Worry: Not on file     Inability: Not on file     Transportation needs     Medical: Not on file     Non-medical: Not on file   Tobacco Use     Smoking status: Former Smoker     Packs/day: 0.00     Years: 3.00     Pack years: 0.00     Smokeless tobacco: Never Used   Substance and Sexual Activity     Alcohol use: Yes     Comment: occasional     Drug use: No     Sexual activity: Not on file   Lifestyle     Physical activity     Days per week: Not on file     Minutes per session: Not on file     Stress: Not on file   Relationships     Social connections     Talks on phone: Not on file     Gets together: Not on file     Attends Yazidi  service: Not on file     Active member of club or organization: Not on file     Attends meetings of clubs or organizations: Not on file     Relationship status: Not on file     Intimate partner violence     Fear of current or ex partner: Not on file     Emotionally abused: Not on file     Physically abused: Not on file     Forced sexual activity: Not on file   Other Topics Concern     Parent/sibling w/ CABG, MI or angioplasty before 65F 55M? Yes     Comment: brother  of heart disease   Social History Narrative     Not on file       Outpatient Encounter Medications as of 2020   Medication Sig Dispense Refill     atorvastatin (LIPITOR) 10 MG tablet Take 10 mg by mouth       betamethasone dipropionate (DIPROSONE) 0.05 % external cream Apply twice daily as needed to rash on back/shoulder for for 3-4 weeks. 45 g 5     BuPROPion HCl (WELLBUTRIN XL PO) Take 300 mg by mouth daily       enalapril (VASOTEC) 20 MG tablet        hydrochlorothiazide (HYDRODIURIL) 25 MG tablet Take 25 mg by mouth       Multiple Vitamins-Minerals (GILBERT MULTIVITAMIN FOR MEN) TABS Take 1 tablet by mouth daily       guaiFENesin-codeine (ROBITUSSIN AC) 100-10 MG/5ML solution Take 10 mLs by mouth every 4 hours as needed for cough (Patient not taking: Reported on 2020) 240 mL 1     sildenafil (VIAGRA) 100 MG tablet Take 100 mg by mouth daily as needed Take 1 tablet by mouth once daily if needed for Erectile Dysfunction. Take 30min to 4 hours before sexual activity. Max 100mg/24hr.       No facility-administered encounter medications on file as of 2020.              Review Of Systems  Skin: As above  Eyes: negative  Ears/Nose/Throat: negative  Respiratory: No shortness of breath, dyspnea on exertion, cough, or hemoptysis  Cardiovascular: negative  Gastrointestinal: negative  Genitourinary: negative  Musculoskeletal: negative  Neurologic: negative  Psychiatric: negative  Hematologic/Lymphatic/Immunologic: negative  Endocrine:  negative      O:   NAD, WDWN, Alert & Oriented, Mood & Affect wnl, Vitals stable   Here today alone   /79   Pulse 84   Resp 16    General appearance normal   Vitals stable   Alert, oriented and in no acute distress     Pink scaly  Plaques on left shoulder and back   Stuck on papules and brown macules on trunk and ext   Red papules on trunk  Brown papules and macules with regular pigment network and borders  Well healed scar on left shoulder  The remainder of skin exam is normal     Eyes: Conjunctivae/lids:Normal     ENT: Lips: normal    MSK:Normal    Pulm: Breathing Normal    Lymph Nodes: No Head and Neck and Axillary Lymphadenopathy     Neuro/Psych: Orientation:Alert and Orientedx3 ; Mood/Affect:normal  A/P:  1. 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 He has not been using.   Apply betamethasone cream twice daily as needed.   Use cetaphil lotion daily.   2. History of MIS on left shoulder  MELANOMA DISCUSSED WITH PATIENT:  I discussed the specifics of tumor, prognosis, metachronous melanoma, self exam, and genetics with the patient. I explained the need for monthly skin exams including and taught the patient how to do this. Patient was asked about new or changing moles . I discussed with patient signs and symptoms that could arise in the setting of recurrent locoregional or metastatic disease. In addition, the need to undergo every 4 month dermatologic full skin survey and evaluation given that patients with a diagnosis of melanoma are at risk of recurrence (local and distant) and of subsequent de juwan melanoma.    3. Seborrheic keratosis, lentigo, angioma, benign nevi, History of BCC   BENIGN LESIONS DISCUSSED WITH PATIENT:  I discussed the specifics of tumor, prognosis, and genetics of benign lesions.  I explained that treatment of these lesions would be purely cosmetic and not medically neccessary.  I discussed with patient  different removal options including excision, cautery and /or laser.      Nature and genetics of benign skin lesions dicussed with patient.  Signs and Symptoms of skin cancer discussed with patient.  ABCDEs of melanoma reviewed with patient.  Patient encouraged to perform monthly skin exams.  UV precautions reviewed with patient.   Risks of non-melanoma skin cancer discussed with patient   Return to clinic in 6 months.         Again, thank you for allowing me to participate in the care of your patient.        Sincerely,        Verona Lynch PA-C

## 2020-05-27 NOTE — PATIENT INSTRUCTIONS
Apply betamethasone cream twice daily as needed to rash on back/shoulder for next 3-4 weeks.    Use cetaphil daily on skin.

## 2020-07-06 ENCOUNTER — NURSE TRIAGE (OUTPATIENT)
Dept: NURSING | Facility: CLINIC | Age: 70
End: 2020-07-06

## 2020-07-06 ENCOUNTER — VIRTUAL VISIT (OUTPATIENT)
Dept: URGENT CARE | Facility: CLINIC | Age: 70
End: 2020-07-06
Payer: COMMERCIAL

## 2020-07-06 DIAGNOSIS — R05.9 COUGH: Primary | ICD-10-CM

## 2020-07-06 DIAGNOSIS — G44.52 NEW DAILY PERSISTENT HEADACHE: ICD-10-CM

## 2020-07-06 DIAGNOSIS — R06.7 SNEEZING: ICD-10-CM

## 2020-07-06 PROCEDURE — 99213 OFFICE O/P EST LOW 20 MIN: CPT | Mod: 95 | Performed by: NURSE PRACTITIONER

## 2020-07-06 NOTE — PROGRESS NOTES
"Jay Carter is a 69 year old male who is being evaluated via a billable telephone visit.      The patient has been notified of following:     \"This telephone visit will be conducted via a call between you and your physician/provider. We have found that certain health care needs can be provided without the need for a physical exam.  This service lets us provide the care you need with a short phone conversation.  If a prescription is necessary we can send it directly to your pharmacy.  If lab work is needed we can place an order for that and you can then stop by our lab to have the test done at a later time.    Telephone visits are billed at different rates depending on your insurance coverage. During this emergency period, for some insurers they may be billed the same as an in-person visit.  Please reach out to your insurance provider with any questions.    If during the course of the call the physician/provider feels a telephone visit is not appropriate, you will not be charged for this service.\"    Patient has given verbal consent for Telephone visit?  Yes  What phone number would you like to be contacted at? [phone]        Subjective     Jay Carter is a 69 year old male who presents via phone visit today for the following health issues:    HPI  Has had some coughing and sneezing with a headache and wants to make sure he is not covid as his brother is coming to [place] for cancer treatment and he will be seeing him.        PAST MEDICAL HISTORY:   Past Medical History:   Diagnosis Date     Basal cell carcinoma        PAST SURGICAL HISTORY: No past surgical history on file.    FAMILY HISTORY:   Family History   Problem Relation Age of Onset     Heart Disease Mother      Myocardial Infarction Brother         before age 65     Bone Cancer Brother      Hypertension Brother      Rheumatoid Arthritis Brother        SOCIAL HISTORY:   Social History     Tobacco Use     Smoking status: Former Smoker     " Packs/day: 0.00     Years: 3.00     Pack years: 0.00     Smokeless tobacco: Never Used   Substance Use Topics     Alcohol use: Yes     Comment: occasional         Review of Systems Mild cough and sneezing with a headache, No sore throat, fever, chest pain, sob, GI or  complaints       Objective   Reported vitals:  There were no vitals taken for this visit.     PSYCH: Alert and oriented times 3; coherent speech, normal   rate and volume, able to articulate logical thoughts, able   to abstract reason, no tangential thoughts, no hallucinations   or delusions    RESP: No cough, no audible wheezing, able to talk in full sentences  Remainder of exam unable to be completed due to telephone visits        Assessment/Plan: Pt will be tested and he understands the 10 day self isolation instructions.    (R05) Cough  (primary encounter diagnosis)  Comment  Plan: Symptomatic COVID-19 Virus (Coronavirus) by PCR            (R06.7) Sneezing  Comment:   Plan: Symptomatic COVID-19 Virus (Coronavirus) by PCR            (G44.52) New daily persistent headache  Comment:   Plan: Symptomatic COVID-19 Virus (Coronavirus) by PCR                  Phone call duration:  2.29 minutes  .VIRI Arias

## 2020-07-06 NOTE — TELEPHONE ENCOUNTER
Has had symptoms 2-3 months    Cough has worse over the past 2-3 weeks    Has a runny nose    No sore throat    Has an abnormal taste.    No fever    Has a post nasal drip    No heart or lung issues    No wheezing     No chest pain    No blood clots    Bella Gutierrez, RN      COVID 19 Nurse Triage Plan/Patient Instructions    Please be aware that novel coronavirus (COVID-19) may be circulating in the community. If you develop symptoms such as fever, cough, or SOB or if you have concerns about the presence of another infection including coronavirus (COVID-19), please contact your health care provider or visit [email or web address].     Disposition/Instructions    Patient to schedule a Virtual Visit with provider. Reference Visit Selection Guide.    Thank you for taking steps to prevent the spread of this virus.  o Limit your contact with others.  o Wear a simple mask to cover your cough.  o Wash your hands well and often.    Resources    [facility]: About COVID-19: [email or web address]    CDC: What to Do If You're Sick: www.cdc.gov/coronavirus/2019-ncov/about/steps-when-sick.html    CDC: Ending Home Isolation: www.cdc.gov/coronavirus/2019-ncov/hcp/disposition-in-home-patients.html     CDC: Caring for Someone: www.cdc.gov/coronavirus/2019-ncov/if-you-are-sick/care-for-someone.html     [facility]: Interim Guidance for Hospital Discharge to Home: www.health.[facility].[place]/diseases/coronavirus/hcp/hospdischarge.pdf    [facility] clinical trials (COVID-19 research studies): clinicalaffairs.[facility]-clinical-trials     Below are the COVID-19 hotlines at the [place] Department of Health ([facility]). Interpreters are available.   o For health questions: Call 358-583-4852 or 1-102.613.7447 (7 a.m. to 7 p.m.)  o For questions about schools and childcare: Call 061-798-6594 or 1-707.905.9805 (7 a.m. to 7 p.m.)                     Additional Information    Negative: Severe difficulty breathing (e.g., struggling  for each breath, speaks in single words)    Negative: Bluish (or gray) lips or face now    Negative: [1] Rapid onset of cough AND [2] has hives    Negative: Coughing started suddenly after medicine, an allergic food or bee sting    Negative: [1] Difficulty breathing AND [2] exposure to flames, smoke, or fumes    Negative: [1] Stridor AND [2] difficulty breathing    Negative: Sounds like a life-threatening emergency to the triager    Negative: Chest pain  (Exception: MILD central chest pain, present only when coughing)    Negative: Difficulty breathing    Negative: Patient sounds very sick or weak to the triager    Negative: [1] Coughed up blood AND [2] > 1 tablespoon (15 ml) (Exception: blood-tinged sputum)    Negative: Fever > 103 F (39.4 C)    Negative: [1] Fever > 101 F (38.3 C) AND [2] age > 60    Negative: [1] Fever > 100.0 F (37.8 C) AND [2] bedridden (e.g., nursing home patient, CVA, chronic illness, recovering from surgery)    Negative: [1] Fever > 100.0 F (37.8 C) AND [2] diabetes mellitus or weak immune system (e.g., HIV positive, cancer chemo, splenectomy, organ transplant, chronic steroids)    Negative: Wheezing is present    Negative: [1] Ankle swelling AND [2] swelling is increasing    Negative: SEVERE coughing spells (e.g., whooping sound after coughing, vomiting after coughing)    Negative: [1] Continuous (nonstop) coughing interferes with work or school AND [2] no improvement using cough treatment per protocol    Negative: Fever present > 3 days (72 hours)    Negative: [1] Fever returns after gone for over 24 hours AND [2] symptoms worse or not improved    Negative: [1] Using nasal washes and pain medicine > 24 hours AND [2] sinus pain (around cheekbone or eye) persists    Negative: Earache is present    Cough has been present for > 3 weeks    Protocols used: COUGH - ACUTE NON-PRODUCTIVE-A-AH

## 2020-07-08 DIAGNOSIS — G44.52 NEW DAILY PERSISTENT HEADACHE: ICD-10-CM

## 2020-07-08 DIAGNOSIS — R05.9 COUGH: ICD-10-CM

## 2020-07-08 DIAGNOSIS — R06.7 SNEEZING: ICD-10-CM

## 2020-07-08 PROCEDURE — 99207 ZZC NO CHARGE LOS: CPT

## 2020-07-08 PROCEDURE — U0003 INFECTIOUS AGENT DETECTION BY NUCLEIC ACID (DNA OR RNA); SEVERE ACUTE RESPIRATORY SYNDROME CORONAVIRUS 2 (SARS-COV-2) (CORONAVIRUS DISEASE [COVID-19]), AMPLIFIED PROBE TECHNIQUE, MAKING USE OF HIGH THROUGHPUT TECHNOLOGIES AS DESCRIBED BY CMS-2020-01-R: HCPCS | Performed by: NURSE PRACTITIONER

## 2020-07-08 NOTE — LETTER
July 12, 2020        Jay Selfpadillarupali  [address]  [facility] [place] [address]    This letter provides a written record that you were tested for COVID-19 on 7/8/20    Your result was negative. This means that we didn t find the virus that causes COVID-19 in your sample. A test may show negative when you do actually have the virus. This can happen when the virus is in the early stages of infection, before you feel illness symptoms.    If you have symptoms   Stay home and away from others (self-isolate) until you meet ALL of the guidelines below:    You ve had no fever--and no medicine that reduces fever--for 3 full days (72 hours). And      Your other symptoms have gotten better. For example, your cough or breathing has improved. And     At least 10 days have passed since your symptoms started.    During this time:    Stay home. Don t go to work, school or anywhere else.     Stay in your own room, including for meals. Use your own bathroom if you can.    Stay away from others in your home. No hugging, kissing or shaking hands. No visitors.    Clean  high touch  surfaces often (doorknobs, counters, handles, etc.). Use a household cleaning spray or wipes. You can find a full list on the EPA website at www.epa.gov/pesticide-registration/list-n-disinfectants-use-against-sars-cov-2.    Cover your mouth and nose with a mask, tissue or washcloth to avoid spreading germs.    Wash your hands and face often with soap and water.    Going back to work  Check with your employer for any guidelines to follow for going back to work.    Employers: This document serves as formal notice that your employee tested negative for COVID-19, as of the testing date shown above.    
July 12, 2020        Jay Selfpadillarupali  [address]  [facility] [place] [address]    This letter provides a written record that you were tested for COVID-19 on  7/8/20      Your result was negative. This means that we didn t find the virus that causes COVID-19 in your sample. A test may show negative when you do actually have the virus. This can happen when the virus is in the early stages of infection, before you feel illness symptoms.    If you have symptoms   Stay home and away from others (self-isolate) until you meet ALL of the guidelines below:    You ve had no fever--and no medicine that reduces fever--for 3 full days (72 hours). And      Your other symptoms have gotten better. For example, your cough or breathing has improved. And     At least 10 days have passed since your symptoms started.    During this time:    Stay home. Don t go to work, school or anywhere else.     Stay in your own room, including for meals. Use your own bathroom if you can.    Stay away from others in your home. No hugging, kissing or shaking hands. No visitors.    Clean  high touch  surfaces often (doorknobs, counters, handles, etc.). Use a household cleaning spray or wipes. You can find a full list on the EPA website at www.epa.gov/pesticide-registration/list-n-disinfectants-use-against-sars-cov-2.    Cover your mouth and nose with a mask, tissue or washcloth to avoid spreading germs.    Wash your hands and face often with soap and water.    Going back to work  Check with your employer for any guidelines to follow for going back to work.    Employers: This document serves as formal notice that your employee tested negative for COVID-19, as of the testing date shown above.    
4

## 2020-07-09 LAB
SARS-COV-2 RNA SPEC QL NAA+PROBE: NOT DETECTED
SPECIMEN SOURCE: NORMAL

## 2020-12-21 LAB
CHOLESTEROL (EXTERNAL): 157 MG/DL (ref 100–199)
HBA1C MFR BLD: 5 %
HDLC SERPL-MCNC: 57 MG/DL
HEP C HIM: NORMAL
LDL CHOLESTEROL (EXTERNAL): 79 MG/DL
NON HDL CHOLESTEROL (EXTERNAL): 100 MG/DL
TRIGLYCERIDES (EXTERNAL): 105 MG/DL

## 2021-04-15 ENCOUNTER — OFFICE VISIT (OUTPATIENT)
Dept: DERMATOLOGY | Facility: CLINIC | Age: 71
End: 2021-04-15
Payer: COMMERCIAL

## 2021-04-15 VITALS — OXYGEN SATURATION: 98 % | HEART RATE: 75 BPM | SYSTOLIC BLOOD PRESSURE: 127 MMHG | DIASTOLIC BLOOD PRESSURE: 73 MMHG

## 2021-04-15 DIAGNOSIS — Z85.828 HISTORY OF BASAL CELL CANCER: ICD-10-CM

## 2021-04-15 DIAGNOSIS — D22.9 MULTIPLE BENIGN NEVI: ICD-10-CM

## 2021-04-15 DIAGNOSIS — L82.1 SEBORRHEIC KERATOSIS: ICD-10-CM

## 2021-04-15 DIAGNOSIS — L81.4 LENTIGO: ICD-10-CM

## 2021-04-15 DIAGNOSIS — L30.0 NUMMULAR ECZEMA: Primary | ICD-10-CM

## 2021-04-15 DIAGNOSIS — L82.0 INFLAMED SEBORRHEIC KERATOSIS: ICD-10-CM

## 2021-04-15 DIAGNOSIS — Z86.006 HISTORY OF MELANOMA IN SITU: ICD-10-CM

## 2021-04-15 DIAGNOSIS — D18.01 CHERRY ANGIOMA: ICD-10-CM

## 2021-04-15 PROCEDURE — 17110 DESTRUCTION B9 LES UP TO 14: CPT | Performed by: PHYSICIAN ASSISTANT

## 2021-04-15 PROCEDURE — 99213 OFFICE O/P EST LOW 20 MIN: CPT | Mod: 25 | Performed by: PHYSICIAN ASSISTANT

## 2021-04-15 RX ORDER — TRIAMCINOLONE ACETONIDE 1 MG/G
CREAM TOPICAL
Qty: 80 G | Refills: 1 | Status: ON HOLD | OUTPATIENT
Start: 2021-04-15 | End: 2021-08-24

## 2021-04-15 NOTE — PROGRESS NOTES
Jay Carter is an extremely pleasant 70 year old year old male patient here today for spots on back.   Patient states this has been present for present for years.  Patient reports the following symptoms:  itchy.  Patient has no other skin complaints today.  Remainder of the HPI, Meds, PMH, Allergies, FH, and SH was reviewed in chart.    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 No pertinent surgical history.     Family History   Problem Relation Age of Onset     Heart Disease Mother      Myocardial Infarction Brother         before age 65     Bone Cancer Brother      Hypertension Brother      Rheumatoid Arthritis Brother        Social History     Socioeconomic History     Marital status:      Spouse name: Not on file     Number of children: Not on file     Years of education: Not on file     Highest education level: Not on file   Occupational History     Not on file   Social Needs     Financial resource strain: Not on file     Food insecurity     Worry: Not on file     Inability: Not on file     Transportation needs     Medical: Not on file     Non-medical: Not on file   Tobacco Use     Smoking status: Former Smoker     Packs/day: 0.00     Years: 3.00     Pack years: 0.00     Smokeless tobacco: Never Used   Substance and Sexual Activity     Alcohol use: Yes     Comment: occasional     Drug use: No     Sexual activity: Not on file   Lifestyle     Physical activity     Days per week: Not on file     Minutes per session: Not on file     Stress: Not on file   Relationships     Social connections     Talks on phone: Not on file     Gets together: Not on file     Attends Druze service: Not on file     Active member of club or organization: Not on file     Attends meetings of clubs or organizations: Not on file     Relationship status: Not on file     Intimate partner violence      Fear of current or ex partner: Not on file     Emotionally abused: Not on file     Physically abused: Not on file     Forced sexual activity: Not on file   Other Topics Concern     Parent/sibling w/ CABG, MI or angioplasty before 65F 55M? Yes     Comment: brother  of heart disease   Social History Narrative     Not on file       Outpatient Encounter Medications as of 4/15/2021   Medication Sig Dispense Refill     atorvastatin (LIPITOR) 10 MG tablet Take 10 mg by mouth       betamethasone dipropionate (DIPROSONE) 0.05 % external cream Apply twice daily as needed to rash on back/shoulder for for 3-4 weeks. 45 g 5     BuPROPion HCl (WELLBUTRIN XL PO) Take 300 mg by mouth daily       enalapril (VASOTEC) 20 MG tablet        guaiFENesin-codeine (ROBITUSSIN AC) 100-10 MG/5ML solution Take 10 mLs by mouth every 4 hours as needed for cough 240 mL 1     hydrochlorothiazide (HYDRODIURIL) 25 MG tablet Take 25 mg by mouth       Multiple Vitamins-Minerals (GILBERT MULTIVITAMIN FOR MEN) TABS Take 1 tablet by mouth daily       triamcinolone (KENALOG) 0.1 % external cream Apply twice daily as needed to rash. 80 g 1     sildenafil (VIAGRA) 100 MG tablet Take 100 mg by mouth daily as needed Take 1 tablet by mouth once daily if needed for Erectile Dysfunction. Take 30min to 4 hours before sexual activity. Max 100mg/24hr.       No facility-administered encounter medications on file as of 4/15/2021.              Review Of Systems  Skin: As above  Eyes: negative  Ears/Nose/Throat: negative  Respiratory: No shortness of breath, dyspnea on exertion, cough      O:   NAD, WDWN, Alert & Oriented, Mood & Affect wnl, Vitals stable   Here today alone   /73   Pulse 75   SpO2 98%    General appearance normal   Vitals stable   Alert, oriented and in no acute distress     Stuck on papules and brown macules on trunk and ext   Red papules on trunk  Brown papules and macules with regular pigment network and borders on torso and  extremities  Eczematous thin plaques on back   The remainder of skin exam is normal       Eyes: Conjunctivae/lids:Normal     ENT: Lips: normal    MSK:Normal    Cardiovascular: peripheral edema none    Pulm: Breathing Normal    Lymph Nodes: No Head and Neck and axillary Lymphadenopathy     Neuro/Psych: Orientation:Alert and Orientedx3 ; Mood/Affect:normal   A/P:  1. Nummular eczema  Apply triamcinolone as needed. Use daily moisturizers.   2. History of MIS on left shoulder  MELANOMA DISCUSSED WITH PATIENT:  I discussed the specifics of tumor, prognosis, metachronous melanoma, self exam, and genetics with the patient. I explained the need for monthly skin exams including and taught the patient how to do this. Patient was asked about new or changing moles . I discussed with patient signs and symptoms that could arise in the setting of recurrent locoregional or metastatic disease. In addition, the need to undergo every 4 month dermatologic full skin survey and evaluation given that patients with a diagnosis of melanoma are at risk of recurrence (local and distant) and of subsequent de juwan melanoma.    3. Inflamed seborrheic keratosis on back   LN2:  Treated with LN2 for 5s for 1-2 cycles. Warned risks of blistering, pain, pigment change, scarring, and incomplete resolution.  Advised patient to return if lesions do not completely resolve.  Wound care sheet given.  4. Seborrheic keratosis, lentigo, angioma, benign nevi, History of BCC Seborrheic keratosis, lentigo, angioma, benign nevi  BENIGN LESIONS DISCUSSED WITH PATIENT:  I discussed the specifics of tumor, prognosis, and genetics of benign lesions.  I explained that treatment of these lesions would be purely cosmetic and not medically neccessary.  I discussed with patient different removal options including excision, cautery and /or laser.      Nature and genetics of benign skin lesions dicussed with patient.  Signs and Symptoms of skin cancer discussed with  patient.  ABCDEs of melanoma reviewed with patient.  Patient encouraged to perform monthly skin exams.  UV precautions reviewed with patient.  Risks of non-melanoma skin cancer discussed with patient   Return to clinic in 6 months or sooner if needed.

## 2021-04-15 NOTE — LETTER
4/15/2021         RE: Jay Carter  [address] [facility] [id number]        Dear Colleague,    Thank you for referring your patient, Jay Carter, to the [facility]. Please see a copy of my visit note below.    Jay Carter is an extremely pleasant 70 year old year old male patient here today for spots on back.   Patient states this has been present for present for years.  Patient reports the following symptoms:  itchy.  Patient has no other skin complaints today.  Remainder of the HPI, Meds, PMH, Allergies, FH, and SH was reviewed in chart.    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 No pertinent surgical history.     Family History   Problem Relation Age of Onset     Heart Disease Mother      Myocardial Infarction Brother         before age 65     Bone Cancer Brother      Hypertension Brother      Rheumatoid Arthritis Brother        Social History     Socioeconomic History     Marital status:      Spouse name: Not on file     Number of children: Not on file     Years of education: Not on file     Highest education level: Not on file   Occupational History     Not on file   Social Needs     Financial resource strain: Not on file     Food insecurity     Worry: Not on file     Inability: Not on file     Transportation needs     Medical: Not on file     Non-medical: Not on file   Tobacco Use     Smoking status: Former Smoker     Packs/day: 0.00     Years: 3.00     Pack years: 0.00     Smokeless tobacco: Never Used   Substance and Sexual Activity     Alcohol use: Yes     Comment: occasional     Drug use: No     Sexual activity: Not on file   Lifestyle     Physical activity     Days per week: Not on file     Minutes per session: Not on file     Stress: Not on file   Relationships     Social connections     Talks on phone: Not on  file     Gets together: Not on file     Attends Alevism service: Not on file     Active member of club or organization: Not on file     Attends meetings of clubs or organizations: Not on file     Relationship status: Not on file     Intimate partner violence     Fear of current or ex partner: Not on file     Emotionally abused: Not on file     Physically abused: Not on file     Forced sexual activity: Not on file   Other Topics Concern     Parent/sibling w/ CABG, MI or angioplasty before 65F 55M? Yes     Comment: brother  of heart disease   Social History Narrative     Not on file       Outpatient Encounter Medications as of 4/15/2021   Medication Sig Dispense Refill     atorvastatin (LIPITOR) 10 MG tablet Take 10 mg by mouth       betamethasone dipropionate (DIPROSONE) 0.05 % external cream Apply twice daily as needed to rash on back/shoulder for for 3-4 weeks. 45 g 5     BuPROPion HCl (WELLBUTRIN XL PO) Take 300 mg by mouth daily       enalapril (VASOTEC) 20 MG tablet        guaiFENesin-codeine (ROBITUSSIN AC) 100-10 MG/5ML solution Take 10 mLs by mouth every 4 hours as needed for cough 240 mL 1     hydrochlorothiazide (HYDRODIURIL) 25 MG tablet Take 25 mg by mouth       Multiple Vitamins-Minerals (GILBERT MULTIVITAMIN FOR MEN) TABS Take 1 tablet by mouth daily       triamcinolone (KENALOG) 0.1 % external cream Apply twice daily as needed to rash. 80 g 1     sildenafil (VIAGRA) 100 MG tablet Take 100 mg by mouth daily as needed Take 1 tablet by mouth once daily if needed for Erectile Dysfunction. Take 30min to 4 hours before sexual activity. Max 100mg/24hr.       No facility-administered encounter medications on file as of 4/15/2021.              Review Of Systems  Skin: As above  Eyes: negative  Ears/Nose/Throat: negative  Respiratory: No shortness of breath, dyspnea on exertion, cough      O:   NAD, WDWN, Alert & Oriented, Mood & Affect wnl, Vitals stable   Here today alone   /73   Pulse 75   SpO2 98%     General appearance normal   Vitals stable   Alert, oriented and in no acute distress     Stuck on papules and brown macules on trunk and ext   Red papules on trunk  Brown papules and macules with regular pigment network and borders on torso and extremities  Eczematous thin plaques on back   The remainder of skin exam is normal       Eyes: Conjunctivae/lids:Normal     ENT: Lips: normal    MSK:Normal    Cardiovascular: peripheral edema none    Pulm: Breathing Normal    Lymph Nodes: No Head and Neck and axillary Lymphadenopathy     Neuro/Psych: Orientation:Alert and Orientedx3 ; Mood/Affect:normal   A/P:  1. Nummular eczema  Apply triamcinolone as needed. Use daily moisturizers.   2. History of MIS on left shoulder  MELANOMA DISCUSSED WITH PATIENT:  I discussed the specifics of tumor, prognosis, metachronous melanoma, self exam, and genetics with the patient. I explained the need for monthly skin exams including and taught the patient how to do this. Patient was asked about new or changing moles . I discussed with patient signs and symptoms that could arise in the setting of recurrent locoregional or metastatic disease. In addition, the need to undergo every 4 month dermatologic full skin survey and evaluation given that patients with a diagnosis of melanoma are at risk of recurrence (local and distant) and of subsequent de juwan melanoma.    3. Inflamed seborrheic keratosis on back   LN2:  Treated with LN2 for 5s for 1-2 cycles. Warned risks of blistering, pain, pigment change, scarring, and incomplete resolution.  Advised patient to return if lesions do not completely resolve.  Wound care sheet given.  4. Seborrheic keratosis, lentigo, angioma, benign nevi, History of BCC Seborrheic keratosis, lentigo, angioma, benign nevi  BENIGN LESIONS DISCUSSED WITH PATIENT:  I discussed the specifics of tumor, prognosis, and genetics of benign lesions.  I explained that treatment of these lesions would be purely cosmetic and  not medically neccessary.  I discussed with patient different removal options including excision, cautery and /or laser.      Nature and genetics of benign skin lesions dicussed with patient.  Signs and Symptoms of skin cancer discussed with patient.  ABCDEs of melanoma reviewed with patient.  Patient encouraged to perform monthly skin exams.  UV precautions reviewed with patient.  Risks of non-melanoma skin cancer discussed with patient   Return to clinic in 6 months or sooner if needed.         Again, thank you for allowing me to participate in the care of your patient.        Sincerely,        Verona Lynch PA-C

## 2021-04-15 NOTE — PATIENT INSTRUCTIONS
Apply triamcinolone cream twice daily as needed.  Recommended routine general skin care:   Eliminate harsh soaps, i.e. Dial, Zest, Monica spring.  Use mild soaps such as Cetaphil, Neutrogena or Dove sensitive skin.  Avoid overly hot or cold showers.  Use Vanicream, Cetaphil, Eucerin or Cerave creams to moisturize your skin.  Scoop-able creams are better than thin lotions.  Apply moisturizer immediately to damp skin after patting skin dry with towel.     [employer] Public inFormation Center:    Informative resources for the public to learn more about  skin conditions, tips on performing self-examinations, sun  safety, and more     The public can find information online at  www aad org or by calling [phone] ([phone])     WOUND CARE INSTRUCTIONS   FOR CRYOSURGERY   This area treated with liquid nitrogen should form a blister (areas treated may or may not blister-skin may just turn dark and slough off). You do not need to bandage the area unless a blister forms and breaks (which may be a few days). When the blister breaks, begin daily dressing changes as follows:  1) Clean and dry the area with tap water using clean Q-tip or sterile gauze pad.   2) Apply Polysporin ointment or Bacitracin ointment over entire wound. Do NOT use Neosporin ointment.   3) Cover the wound with a band-aid or sterile non-stick gauze pad and micropore paper tape.   REPEAT THESE INSTRUCTIONS AT LEAST ONCE A DAY UNTIL THE WOUND HAS COMPLETELY HEALED.   It is an old wives tale that a wound heals better when it is exposed to air and allowed to dry out. The wound will heal faster with a better cosmetic result if it is kept moist with ointment and covered with a bandage.   Do not let the wound dry out.   IMPORTANT INFORMATION ON REVERSE SIDE   Supplies Needed:   *Cotton tipped applicators (Q-tips)   *Polysporin ointment or Bacitracin ointment (NOT NEOSPORIN)   *Band-aids, or non stick gauze pads and micropore paper tape    PATIENT INFORMATION   During the healing process you will notice a number of changes. All wounds develop a small halo of redness surrounding the wound. This means healing is occurring. Severe itching with extensive redness usually indicates sensitivity to the ointment or bandage tape used to dress the wound. You should call our office if this develops.   Swelling and/or discoloration around your surgical site is common, particularly when performed around the eye.   All wounds normally drain. The larger the wound the more drainage there will be. After 7-10 days, you will notice the wound beginning to shrink and new skin will begin to grow. The wound is healed when you can see skin has formed over the entire area. A healed wound has a healthy, shiny look to the surface and is red to dark pink in color to normalize. Wounds may take approximately 4-6 weeks to heal. Larger wounds may take 6-8 weeks. After the wound is healed you may discontinue dressing changes.   You may experience a sensation of tightness as your wound heals. This is normal and will gradually subside.   Your healed wound may be sensitive to temperature changes. This sensitivity improves with time, but if you re having a lot of discomfort, try to avoid temperature extremes.   Patients frequently experience itching after their wound appears to have healed because of the continue healing under the skin. Plain Vaseline will help relieve the itching.

## 2021-06-02 VITALS
BODY MASS INDEX: 32.07 KG/M2 | HEIGHT: 69 IN | WEIGHT: 216.49 LBS | WEIGHT: 216.49 LBS | HEIGHT: 69 IN | BODY MASS INDEX: 32.07 KG/M2

## 2021-06-23 NOTE — PLAN OF CARE
Goal: Patient s discharge needs are met.  Outcome: Care Progression reviewed with Hospitalist, Care Manager.  Discharge Disposition: Discussed and plan to discharge to:  home  Planned Discharge Date:  2/7/19  Problem: Barriers to discharge include:  N/A  Transportation needs/Ride Time:  Girlfriend to transport     Chart assessed.  No CM indicators identified.  Anticipate pt to discharge home with no needs.  Girlfriend to transport.      ABENA Donovan, BARBARA 02/07/19 10:26 AM

## 2021-06-23 NOTE — ANESTHESIA CARE TRANSFER NOTE
Last vitals:   Vitals:    02/06/19 1405   BP: (!) 185/90   Pulse: 76   Resp: 15   Temp: 37.3  C (99.2  F)   SpO2: 96%     Patient's level of consciousness is awake  Spontaneous respirations: yes  Maintains airway independently: yes  Dentition unchanged: yes  Oropharynx: oropharynx clear of all foreign objects    QCDR Measures:  ASA# 20 - Surgical Safety Checklist: WHO surgical safety checklist completed prior to induction    PQRS# 430 - Adult PONV Prevention: 4558F - Pt received => 2 anti-emetic agents (different classes) preop & intraop  ASA# 8 - Peds PONV Prevention: 4558F-8P - Pt did NOT receive => 2 antiemetic agents  PQRS# 424 - Joan-op Temp Management: 4559F - At least one body temp DOCUMENTED => 35.5C or 95.9F within required timeframe  PQRS# 426 - PACU Transfer Protocol: - Transfer of care checklist used  ASA# 14 - Acute Post-op Pain: ASA14B - Patient did NOT experience pain >= 7 out of 10

## 2021-06-23 NOTE — ANESTHESIA PREPROCEDURE EVALUATION
Anesthesia Evaluation      Patient summary reviewed   No history of anesthetic complications     Airway   Mallampati: II   Pulmonary - normal exam   (+) sleep apnea on no CPAP, ,                          Cardiovascular - negative ROS and normal exam  Exercise tolerance: > or = 4 METS  (+) hypertension, , hypercholesterolemia,     ECG reviewed (2/1/19 NSR)  Rhythm: regular  Rate: normal,         Neuro/Psych - negative ROS   (+) depression,     Endo/Other    (+) obesity,      GI/Hepatic/Renal - negative ROS      Other findings: 10.3     RED BLOOD COUNT           4.30 - 5.90 mil/cu mm 5.06   HEMOGLOBIN                13.5 - 17.5 g/dL 15.6   HEMATOCRIT                37.0 - 53.0 % 46.3   MCV                       80 - 100 fL 92   MCH                       26.0 - 34.0 pg 30.8   MCHC                      32.0 - 36.0 g/dL 33.7   RDW                       11.5 - 15.5 % 12.9   PLATELET COUNT            140 - 440 thou/cu mm 284   136     POTASSIUM 3.5 - 5.0 mmol/L 3.8   CHLORIDE 98 - 110 mmol/L 102   CO2,TOTAL 21 - 31 mmol/L 26   ANION GAP 5 - 18  8   GLUCOSE 65 - 100 mg/dL 116 Abnormally high   CALCIUM 8.5 - 10.5 mg/dL 9.9   BUN 8 - 25 mg/dL 22   CREATININE 0.72 - 1.25 mg/dL 1.22             Dental - normal exam                        Anesthesia Plan  Planned anesthetic: general endotracheal  GAETT  Antiemetics  Toradol at the end of case if okay with surgeon  Soft bite block    ASA 2   Induction: intravenous   Anesthetic plan and risks discussed with: patient    Post-op plan: routine recovery

## 2021-06-23 NOTE — OP NOTE
Operative Note     Name:  Jay Carter  PCP:  Wegener, Brita L, PA-C  Procedure Date:  2/6/2019  Location: [facility] OR      ROBOTIC RADICAL RETROPUBIC PROSTATECTOMY WITH BILATERAL PELVIC LYMPH NODE DISSECTION (Bilateral)    Pre-Procedure Diagnosis:  Prostate cancer (H) [C61]     Post-Procedure Diagnosis:    Prostate Cancer    Surgeon(s):  eSan Perez MD Gaertner, Robert A, MD    No Physician Assistant or First Assist has been documented in procedure    Anesthesia Type:  General    Past Medical History:   Diagnosis Date     AK (actinic keratosis)      Benign essential hypertension      Current mild episode of major depressive disorder (H)      Erectile dysfunction      History of transfusion 1985    With Back surgery     Hyperlipidemia      TARA (obstructive sleep apnea)     not using CPAP     Prostate cancer (H)        There are no active problems to display for this patient.      Findings:  Prostate cancer      Operative Report:      The patient underwent induction of general anesthesia was prepped and draped in the dorsal lithotomy position under sterile conditions.  Contreras cath was placed in the table.  A varies needle was placed through a stab incision below the umbilicus and the abdomen was insufflated to 15 mmHg.  A 10/12 port was used under direct vision to look into the abdomen.  Under direct vision two, 8 mm ports were placed on the left, an 8 mm 10/12 and a 5 in the right.  The abdomen was inspected.    The instruments were placed.  The sigmoid colon was reflected medially.  The posterior peritoneum behind the bladder was incised and the right left vas deferens were identified.  A small portion of vas was excised and removed bilaterally.  Using the 30-up Lens the plane was developed between the rectum and the prostate to the apex of the prostate.    The seminal vesicles were then then dissected free.  A pack was placed in the pelvis.    The bladder was sharply and bluntly taken  down to the endopelvic fascia.  A bilateral pelvic lymph node dissection was performed.  The obturator nerve was visualized and spared bilaterally.  Clips were used to control the lymphatics.    The endopelvic fascia was then incised and the puboprostatic ligaments were ligated.  A dorsal venous complex stitch ×2 with a suspension suture to the pubic bone was placed.    Using the 30 down lens the lateral portion of the prostate was identified and the neurovascular bundles were dissected off from the apex to the  midportion of the prostate.    Estimated Blood Loss:   50 mL from 2/6/2019 11:45 AM to 2/6/2019  2:00 PM    Specimens:    [unfilled]       Drains:   Urethral Catheter Latex;Double-lumen 18 Fr. (Active)   Care/Interventions Patent and draining 2/6/2019 12:37 PM       Complications:    None    Riley Gabriel     Date: 2/6/2019  Time: 2:00 PM

## 2021-06-23 NOTE — DISCHARGE SUMMARY
Physician Discharge Summary    Primary Care Physician:  Wegener, Brita L, PA-C    Discharge Provider: Sean Perez     Admission Date: 2/6/2019. Admission Diagnoses: Prostate cancer (H) [C61]   Discharge Date and Time: No discharge date for patient encounter.   Disposition: Final discharge disposition not confirmed  Condition at Discharge: Good  Code Status: Full Code  Location: [facility] Main OR       Principal Diagnosis:  <principal problem not specified>    Discharge Diagnoses:  Active Problems:    * No active hospital problems. *      Significant Diagnostic Studies: labs: stable    Surgery:  Robotic prostatectomy and bilateral pelvic lymph node dissection    Discharge Medications:      Medication List      START taking these medications    cephalexin 500 MG capsule  Quantity:  20 capsule  Dose:  500 mg  Commonly known as:  KEFLEX  500 mg, Oral, 2 times daily     oxyCODONE-acetaminophen 5-325 mg per tablet  Quantity:  20 tablet  Dose:  1 tablet  Commonly known as:  PERCOCET  1 tablet, Oral, Every 6 hours PRN     polyethylene glycol 17 gram packet  Quantity:  14 each  Dose:  17 g  Commonly known as:  MIRALAX  17 g, Oral, DAILY, Take once a day.  OK to discontinue if bowels are loose.        CONTINUE taking these medications    buPROPion 200 MG 12 hr tablet  Dose:  200 mg  Commonly known as:  WELLBUTRIN SR  200 mg, Oral, DAILY     enalapril 20 MG tablet  Dose:  20 mg  Commonly known as:  VASOTEC  20 mg, Oral, DAILY     hydroCHLOROthiazide 25 MG tablet  Dose:  25 mg  Commonly known as:  HYDRODIURIL  25 mg, Oral, DAILY     melatonin 5 mg Tab tablet  Dose:  5 mg  5 mg, Oral, Bedtime     omeprazole 20 MG capsule  Dose:  20 mg  Commonly known as:  PriLOSEC  20 mg, Oral, Daily PRN     rosuvastatin 20 MG tablet  Dose:  20 mg  Commonly known as:  CRESTOR  20 mg, Oral, Bedtime            Discharge Instructions:  Follow up appointment with surgeon Sean Perez  Activity: no heavy lifting  Restrictions:  no driving for 1 week  Wound / drain care:keep wound clean and dry

## 2021-06-23 NOTE — PROGRESS NOTES
"Pharmacy Note - Admission Medication History  Pertinent Provider Information:   -Patient takes omeprazole 2-3 x's per week.  -Patient instructed to start ASA 81 mg PO once daily after surgery once he receives the \"go ahead\" from his MD.   ______________________________________________________________________  Prior To Admission (PTA) med list completed and updated in EMR.     PTA Med List   Medication Sig Last Dose     buPROPion (WELLBUTRIN SR) 200 MG 12 hr tablet Take 200 mg by mouth daily.        2/6/2019 at AM     enalapril (VASOTEC) 20 MG tablet Take 20 mg by mouth daily. 2/4/2019 at AM     hydroCHLOROthiazide (HYDRODIURIL) 25 MG tablet Take 25 mg by mouth daily. 2/6/2019 at AM     melatonin 5 mg Tab tablet Take 5 mg by mouth at bedtime. 2/5/2019 at HS     omeprazole (PRILOSEC) 20 MG capsule Take 20 mg by mouth daily as needed. 2/6/2019 at AM     rosuvastatin (CRESTOR) 20 MG tablet Take 20 mg by mouth at bedtime. 2/5/2019 at HS       Information source(s): Patient and Clinic records  Patient was asked about OTC/herbal products specifically.  PTA med list reflects this.  Based on the pharmacist s assessment, the PTA med list information appears reliable  Allergies were reviewed, assessed, and updated with the patient.    Patient does not use any multi-dose medications prior to admission.   Thank you for the opportunity to participate in the care of this patient.    Eryn Diego, PharmD     2/6/2019     10:08 AM        "

## 2021-06-23 NOTE — PLAN OF CARE
Pt discharging home with significant other.  Discharge teaching completed with pt and s/o.  Catheter teaching completed.  Pt to f/u with urology in one week.  Francy Blair RN

## 2021-06-23 NOTE — PLAN OF CARE
Pain      Patient's pain/discomfort is manageable Progressing        Marshall reports mild acute pain at coffey insertion site, scheduled Bacitracin applied.  Denies abdominal discomfort.  BS present, denies flatus.  Ambulated in halls x2 overnight.  Steady/coordinated, able to ambulate independently.  Good output of light alfonzo urine.

## 2021-06-23 NOTE — OP NOTE
Operative Note    Name:  Jay Carter  PCP:  Wegener, Brita L, PA-C  Procedure Date:  2/6/2019   Location: [facility] OR        ROBOTIC RADICAL RETROPUBIC PROSTATECTOMY WITH BILATERAL PELVIC LYMPH NODE DISSECTION (Bilateral)    Pre-Procedure Diagnosis:  Prostate cancer (H) [C61]     Post-Procedure Diagnosis:    Prostate cancer    Surgeon(s):  Sean Perez MD Gaertner, Robert A, MD    No Physician Assistant or First Assist has been documented in procedure        Anesthesia Type:  General    Past Medical History:   Diagnosis Date     AK (actinic keratosis)      Benign essential hypertension      Current mild episode of major depressive disorder (H)      Erectile dysfunction      History of transfusion 1985    With Back surgery     Hyperlipidemia      TARA (obstructive sleep apnea)     not using CPAP     Prostate cancer (H)        There are no active problems to display for this patient.      Findings: Margi residual urine.       Operative Report:      The bladder neck was then identified and scored down to the coffey catheter.  The balloon was deflated.  The posterior bladder was identified and scored down to the seminal vesicles.  The vascular pedicles were progressively isolated and clipped with hemoclips.  Care was used not to use any cautery near the nerves.      Using the 0 lens the apex was dissected of down to the urethra.  The urethra was transected.  A margin was taken at urethra and bladder neck.  The prostate was bagged along with the Ray-Brigitte sponge.    The bladder neck was reconstructed using 2-0 vicryl.  The anastomosis was completed between the bladder and the urethra using 3-0 Monocryl.  The coffey was placed, irrigated and noted to be watertight.  Sponge and needle count was correct.    The robot was undocked.  The specimen bag was brought to the midline port.  The trochars were removed.  The specimen was delivered.  The ports were infiltrated with Marcaine.  The fascia was closed  in the midline port with interrupted #1 Vicryl.  All skin incisions were closed with 400 Vicryl.  Patient sent to recovery room in stable condition.    Please see Dr. Gabriel's dictation for the first portion of the procedure.    Estimated Blood Loss:   50 mL from 2/6/2019 11:45 AM to 2/6/2019  2:02 PM    Specimens:    [unfilled]       Drains:   Urethral Catheter Latex;Double-lumen 18 Fr. (Active)   Care/Interventions Patent and draining 2/6/2019  2:01 PM   Securement Method Leg strap 2/6/2019  2:01 PM       Complications:    None    Sean Perez     Date: 2/6/2019  Time: 2:02 PM

## 2021-06-30 DIAGNOSIS — Z11.59 ENCOUNTER FOR SCREENING FOR OTHER VIRAL DISEASES: ICD-10-CM

## 2021-08-10 ENCOUNTER — TRANSFERRED RECORDS (OUTPATIENT)
Dept: MULTI SPECIALTY CLINIC | Facility: CLINIC | Age: 71
End: 2021-08-10

## 2021-08-10 LAB
CREATININE (EXTERNAL): 1.15 MG/DL (ref 0.72–1.25)
GFR ESTIMATED (EXTERNAL): >60 ML/MIN/1.73M2
GFR ESTIMATED (IF AFRICAN AMERICAN) (EXTERNAL): >60 ML/MIN/1.73M2
GLUCOSE (EXTERNAL): 86 MG/DL (ref 65–100)
POTASSIUM (EXTERNAL): 3.8 MMOL/L (ref 3.5–5)

## 2021-08-22 ENCOUNTER — LAB (OUTPATIENT)
Dept: FAMILY MEDICINE | Facility: CLINIC | Age: 71
End: 2021-08-22
Payer: COMMERCIAL

## 2021-08-22 DIAGNOSIS — Z11.59 ENCOUNTER FOR SCREENING FOR OTHER VIRAL DISEASES: ICD-10-CM

## 2021-08-22 PROCEDURE — U0005 INFEC AGEN DETEC AMPLI PROBE: HCPCS

## 2021-08-22 PROCEDURE — U0003 INFECTIOUS AGENT DETECTION BY NUCLEIC ACID (DNA OR RNA); SEVERE ACUTE RESPIRATORY SYNDROME CORONAVIRUS 2 (SARS-COV-2) (CORONAVIRUS DISEASE [COVID-19]), AMPLIFIED PROBE TECHNIQUE, MAKING USE OF HIGH THROUGHPUT TECHNOLOGIES AS DESCRIBED BY CMS-2020-01-R: HCPCS

## 2021-08-23 ENCOUNTER — ANESTHESIA EVENT (OUTPATIENT)
Dept: SURGERY | Facility: CLINIC | Age: 71
End: 2021-08-23
Payer: COMMERCIAL

## 2021-08-23 LAB — SARS-COV-2 RNA RESP QL NAA+PROBE: NEGATIVE

## 2021-08-23 ASSESSMENT — LIFESTYLE VARIABLES: TOBACCO_USE: 1

## 2021-08-23 NOTE — ANESTHESIA PREPROCEDURE EVALUATION
Anesthesia Pre-Procedure Evaluation    Patient: Jay Carter   MRN: [id number] : 1950        Preoperative Diagnosis: Degenerative joint disease [M19.90]   Procedure : Procedure(s):  TOTAL Knee Arthroplasty     Past Medical History:   Diagnosis Date     Basal cell carcinoma       Past Surgical History:   Procedure Laterality Date     ARTHROSCOPY SHOULDER      converted to open, complications     ARTHROSCOPY SHOULDER ROTATOR CUFF REPAIR Bilateral      BACK SURGERY       CERVICAL LAMINECTOMY      hemilaminectomy C6-7     OTHER SURGICAL HISTORY      excision pilonidal cyst     NE LAP,PROSTATECTOMY,RADICAL,W/NERVE SPARE,INCL ROBOTIC Bilateral 2019    Procedure: ROBOTIC RADICAL RETROPUBIC PROSTATECTOMY WITH BILATERAL PELVIC LYMPH NODE DISSECTION;  Surgeon: Sean Perez MD;  Location: [facility];  Service: Urology     RELEASE TRIGGER FINGER Bilateral      SPINAL CORD DECOMPRESSION      L5     WISDOM TOOTH EXTRACTION        Allergies   Allergen Reactions     Ampicillin Rash     Vancomycin Tinnitus     Ciprofloxacin      Adhesive Tape Rash      Social History     Tobacco Use     Smoking status: Former Smoker     Packs/day: 0.00     Years: 3.00     Pack years: 0.00     Smokeless tobacco: Never Used   Substance Use Topics     Alcohol use: Yes     Comment: occasional      Wt Readings from Last 1 Encounters:   10/26/19 93 kg (205 lb)        Anesthesia Evaluation   Pt has had prior anesthetic.         ROS/MED HX  ENT/Pulmonary:     (+) sleep apnea, tobacco use, Past use,     Neurologic:       Cardiovascular:     (+) Dyslipidemia hypertension-----    METS/Exercise Tolerance:     Hematologic:       Musculoskeletal:       GI/Hepatic:       Renal/Genitourinary:       Endo:       Psychiatric/Substance Use:     (+) psychiatric history depression     Infectious Disease:       Malignancy:       Other:            Physical Exam    Airway        Mallampati: I   TM distance: > 3 FB   Neck ROM: full    Mouth opening: > 3 cm    Respiratory Devices and Support         Dental  no notable dental history         Cardiovascular   cardiovascular exam normal          Pulmonary   pulmonary exam normal                OUTSIDE LABS:  CBC:   Lab Results   Component Value Date    WBC 10.8 10/26/2019    HGB 14.7 10/26/2019    HGB 13.7 (L) 02/07/2019    HCT 43.8 10/26/2019     10/26/2019     02/06/2019     BMP:   Lab Results   Component Value Date     01/29/2018    POTASSIUM 3.9 01/29/2018    CHLORIDE 103 01/29/2018    CO2 29 01/29/2018    BUN 16 01/29/2018    CR 0.89 01/29/2018    GLC 91 02/06/2019    GLC 87 01/29/2018     COAGS: No results found for: PTT, INR, FIBR  POC: No results found for: BGM, HCG, HCGS  HEPATIC: No results found for: ALBUMIN, PROTTOTAL, ALT, AST, GGT, ALKPHOS, BILITOTAL, BILIDIRECT, TRACEY  OTHER:   Lab Results   Component Value Date    KOLTON 8.8 01/29/2018       Anesthesia Plan    ASA Status:  3      Anesthesia Type: Spinal.              Consents    Anesthesia Plan(s) and associated risks, benefits, and realistic alternatives discussed. Questions answered and patient/representative(s) expressed understanding.     - Discussed with:  Patient         Postoperative Care       PONV prophylaxis: Ondansetron (or other 5HT-3), Dexamethasone or Solumedrol     Comments:    Left adductor canal block.            Austin Joyner CRNA, APRN CRNA

## 2021-08-24 ENCOUNTER — APPOINTMENT (OUTPATIENT)
Dept: GENERAL RADIOLOGY | Facility: CLINIC | Age: 71
End: 2021-08-24
Attending: ORTHOPAEDIC SURGERY
Payer: COMMERCIAL

## 2021-08-24 ENCOUNTER — HOSPITAL ENCOUNTER (OUTPATIENT)
Facility: CLINIC | Age: 71
Discharge: HOME | End: 2021-08-25
Attending: ORTHOPAEDIC SURGERY | Admitting: ORTHOPAEDIC SURGERY
Payer: COMMERCIAL

## 2021-08-24 ENCOUNTER — ANESTHESIA (OUTPATIENT)
Dept: SURGERY | Facility: CLINIC | Age: 71
End: 2021-08-24
Payer: COMMERCIAL

## 2021-08-24 DIAGNOSIS — Z96.651 STATUS POST RIGHT KNEE REPLACEMENT: Primary | ICD-10-CM

## 2021-08-24 PROBLEM — D03.62 MELANOMA IN SITU OF LEFT UPPER EXTREMITY INCLUDING SHOULDER (H): Status: ACTIVE | Noted: 2019-11-14

## 2021-08-24 PROBLEM — Z85.46 HISTORY OF MALIGNANT NEOPLASM OF PROSTATE: Status: ACTIVE | Noted: 2019-05-30

## 2021-08-24 PROBLEM — Z96.659 STATUS POST TOTAL KNEE REPLACEMENT: Status: ACTIVE | Noted: 2021-08-24

## 2021-08-24 PROBLEM — I10 BENIGN ESSENTIAL HYPERTENSION: Status: ACTIVE | Noted: 2017-01-25

## 2021-08-24 PROCEDURE — 258N000003 HC RX IP 258 OP 636: Performed by: ORTHOPAEDIC SURGERY

## 2021-08-24 PROCEDURE — 250N000013 HC RX MED GY IP 250 OP 250 PS 637: Performed by: NURSE ANESTHETIST, CERTIFIED REGISTERED

## 2021-08-24 PROCEDURE — 250N000011 HC RX IP 250 OP 636: Performed by: NURSE ANESTHETIST, CERTIFIED REGISTERED

## 2021-08-24 PROCEDURE — 258N000003 HC RX IP 258 OP 636: Performed by: NURSE ANESTHETIST, CERTIFIED REGISTERED

## 2021-08-24 PROCEDURE — 258N000001 HC RX 258: Performed by: ORTHOPAEDIC SURGERY

## 2021-08-24 PROCEDURE — 710N000009 HC RECOVERY PHASE 1, LEVEL 1, PER MIN: Performed by: ORTHOPAEDIC SURGERY

## 2021-08-24 PROCEDURE — 999N000141 HC STATISTIC PRE-PROCEDURE NURSING ASSESSMENT: Performed by: ORTHOPAEDIC SURGERY

## 2021-08-24 PROCEDURE — 999N000065 XR KNEE PORT LEFT 1/2 VIEWS

## 2021-08-24 PROCEDURE — 250N000009 HC RX 250: Performed by: NURSE ANESTHETIST, CERTIFIED REGISTERED

## 2021-08-24 PROCEDURE — 272N000001 HC OR GENERAL SUPPLY STERILE: Performed by: ORTHOPAEDIC SURGERY

## 2021-08-24 PROCEDURE — 250N000013 HC RX MED GY IP 250 OP 250 PS 637: Performed by: ORTHOPAEDIC SURGERY

## 2021-08-24 PROCEDURE — 360N000077 HC SURGERY LEVEL 4, PER MIN: Performed by: ORTHOPAEDIC SURGERY

## 2021-08-24 PROCEDURE — 250N000013 HC RX MED GY IP 250 OP 250 PS 637: Performed by: PHYSICIAN ASSISTANT

## 2021-08-24 PROCEDURE — 370N000017 HC ANESTHESIA TECHNICAL FEE, PER MIN: Performed by: ORTHOPAEDIC SURGERY

## 2021-08-24 PROCEDURE — 250N000011 HC RX IP 250 OP 636: Performed by: ORTHOPAEDIC SURGERY

## 2021-08-24 PROCEDURE — 250N000009 HC RX 250: Performed by: ORTHOPAEDIC SURGERY

## 2021-08-24 PROCEDURE — C1776 JOINT DEVICE (IMPLANTABLE): HCPCS | Performed by: ORTHOPAEDIC SURGERY

## 2021-08-24 PROCEDURE — 250N000011 HC RX IP 250 OP 636: Performed by: PHYSICIAN ASSISTANT

## 2021-08-24 PROCEDURE — 278N000051 HC OR IMPLANT GENERAL: Performed by: ORTHOPAEDIC SURGERY

## 2021-08-24 PROCEDURE — 271N000001 HC OR GENERAL SUPPLY NON-STERILE: Performed by: ORTHOPAEDIC SURGERY

## 2021-08-24 DEVICE — IMPLANTABLE DEVICE: Type: IMPLANTABLE DEVICE | Site: KNEE | Status: FUNCTIONAL

## 2021-08-24 DEVICE — BONE CEMENT RADIOPAQUE SIMPLEX HV FULL DOSE 6194-1-001: Type: IMPLANTABLE DEVICE | Site: KNEE | Status: FUNCTIONAL

## 2021-08-24 DEVICE — IMP TIB BASE JJ ATTUNE FX BR SYS CEM SZ7 1506-70-007: Type: IMPLANTABLE DEVICE | Site: KNEE | Status: FUNCTIONAL

## 2021-08-24 RX ORDER — ROPIVACAINE HYDROCHLORIDE 5 MG/ML
INJECTION, SOLUTION EPIDURAL; INFILTRATION; PERINEURAL PRN
Status: DISCONTINUED | OUTPATIENT
Start: 2021-08-24 | End: 2021-08-24

## 2021-08-24 RX ORDER — HYDROMORPHONE HCL IN WATER/PF 6 MG/30 ML
0.2 PATIENT CONTROLLED ANALGESIA SYRINGE INTRAVENOUS
Status: DISCONTINUED | OUTPATIENT
Start: 2021-08-24 | End: 2021-08-25 | Stop reason: HOSPADM

## 2021-08-24 RX ORDER — BUPIVACAINE HYDROCHLORIDE 7.5 MG/ML
INJECTION, SOLUTION INTRASPINAL PRN
Status: DISCONTINUED | OUTPATIENT
Start: 2021-08-24 | End: 2021-08-24

## 2021-08-24 RX ORDER — ACETAMINOPHEN 325 MG/1
975 TABLET ORAL EVERY 8 HOURS
Status: DISCONTINUED | OUTPATIENT
Start: 2021-08-24 | End: 2021-08-25 | Stop reason: HOSPADM

## 2021-08-24 RX ORDER — ACETAMINOPHEN 325 MG/1
650 TABLET ORAL EVERY 4 HOURS PRN
Status: DISCONTINUED | OUTPATIENT
Start: 2021-08-27 | End: 2021-08-25 | Stop reason: HOSPADM

## 2021-08-24 RX ORDER — LIDOCAINE HCL/EPINEPHRINE/PF 2%-1:200K
VIAL (ML) INJECTION PRN
Status: DISCONTINUED | OUTPATIENT
Start: 2021-08-24 | End: 2021-08-24

## 2021-08-24 RX ORDER — ENALAPRIL MALEATE 10 MG/1
20 TABLET ORAL DAILY
Status: DISCONTINUED | OUTPATIENT
Start: 2021-08-25 | End: 2021-08-25 | Stop reason: HOSPADM

## 2021-08-24 RX ORDER — ONDANSETRON 2 MG/ML
4 INJECTION INTRAMUSCULAR; INTRAVENOUS EVERY 6 HOURS PRN
Status: DISCONTINUED | OUTPATIENT
Start: 2021-08-24 | End: 2021-08-25 | Stop reason: HOSPADM

## 2021-08-24 RX ORDER — AMOXICILLIN 250 MG
1 CAPSULE ORAL 2 TIMES DAILY
Status: DISCONTINUED | OUTPATIENT
Start: 2021-08-24 | End: 2021-08-25 | Stop reason: HOSPADM

## 2021-08-24 RX ORDER — OMEPRAZOLE 20 MG/1
20 TABLET, DELAYED RELEASE ORAL
COMMUNITY
Start: 2020-04-20

## 2021-08-24 RX ORDER — NALOXONE HYDROCHLORIDE 0.4 MG/ML
0.4 INJECTION, SOLUTION INTRAMUSCULAR; INTRAVENOUS; SUBCUTANEOUS
Status: DISCONTINUED | OUTPATIENT
Start: 2021-08-24 | End: 2021-08-25 | Stop reason: HOSPADM

## 2021-08-24 RX ORDER — ONDANSETRON 4 MG/1
4 TABLET, ORALLY DISINTEGRATING ORAL EVERY 6 HOURS PRN
Status: DISCONTINUED | OUTPATIENT
Start: 2021-08-24 | End: 2021-08-25 | Stop reason: HOSPADM

## 2021-08-24 RX ORDER — HYDROXYZINE HYDROCHLORIDE 25 MG/1
25 TABLET, FILM COATED ORAL EVERY 6 HOURS PRN
Status: DISCONTINUED | OUTPATIENT
Start: 2021-08-24 | End: 2021-08-25 | Stop reason: HOSPADM

## 2021-08-24 RX ORDER — ACETAMINOPHEN 325 MG/1
975 TABLET ORAL ONCE
Status: COMPLETED | OUTPATIENT
Start: 2021-08-24 | End: 2021-08-24

## 2021-08-24 RX ORDER — BISACODYL 10 MG
10 SUPPOSITORY, RECTAL RECTAL DAILY PRN
Status: DISCONTINUED | OUTPATIENT
Start: 2021-08-24 | End: 2021-08-25 | Stop reason: HOSPADM

## 2021-08-24 RX ORDER — MAGNESIUM SULFATE HEPTAHYDRATE 40 MG/ML
2 INJECTION, SOLUTION INTRAVENOUS ONCE
Status: COMPLETED | OUTPATIENT
Start: 2021-08-24 | End: 2021-08-24

## 2021-08-24 RX ORDER — SODIUM CHLORIDE, SODIUM LACTATE, POTASSIUM CHLORIDE, CALCIUM CHLORIDE 600; 310; 30; 20 MG/100ML; MG/100ML; MG/100ML; MG/100ML
INJECTION, SOLUTION INTRAVENOUS CONTINUOUS
Status: DISCONTINUED | OUTPATIENT
Start: 2021-08-24 | End: 2021-08-25 | Stop reason: HOSPADM

## 2021-08-24 RX ORDER — LIDOCAINE 40 MG/G
CREAM TOPICAL
Status: DISCONTINUED | OUTPATIENT
Start: 2021-08-24 | End: 2021-08-25 | Stop reason: HOSPADM

## 2021-08-24 RX ORDER — ROSUVASTATIN CALCIUM 20 MG/1
1 TABLET, COATED ORAL DAILY
COMMUNITY

## 2021-08-24 RX ORDER — HYDROMORPHONE HCL IN WATER/PF 6 MG/30 ML
0.4 PATIENT CONTROLLED ANALGESIA SYRINGE INTRAVENOUS
Status: DISCONTINUED | OUTPATIENT
Start: 2021-08-24 | End: 2021-08-25 | Stop reason: HOSPADM

## 2021-08-24 RX ORDER — TRIAMCINOLONE ACETONIDE 55 UG/1
2 SPRAY, METERED NASAL EVERY EVENING
COMMUNITY
Start: 2020-11-16

## 2021-08-24 RX ORDER — KETAMINE HYDROCHLORIDE 10 MG/ML
INJECTION, SOLUTION INTRAMUSCULAR; INTRAVENOUS PRN
Status: DISCONTINUED | OUTPATIENT
Start: 2021-08-24 | End: 2021-08-24

## 2021-08-24 RX ORDER — ONDANSETRON 2 MG/ML
4 INJECTION INTRAMUSCULAR; INTRAVENOUS EVERY 30 MIN PRN
Status: DISCONTINUED | OUTPATIENT
Start: 2021-08-24 | End: 2021-08-24 | Stop reason: HOSPADM

## 2021-08-24 RX ORDER — LIDOCAINE HYDROCHLORIDE 10 MG/ML
INJECTION, SOLUTION INFILTRATION; PERINEURAL PRN
Status: DISCONTINUED | OUTPATIENT
Start: 2021-08-24 | End: 2021-08-24

## 2021-08-24 RX ORDER — OXYCODONE HYDROCHLORIDE 5 MG/1
5 TABLET ORAL EVERY 4 HOURS PRN
Status: DISCONTINUED | OUTPATIENT
Start: 2021-08-24 | End: 2021-08-25 | Stop reason: HOSPADM

## 2021-08-24 RX ORDER — SODIUM CHLORIDE, SODIUM LACTATE, POTASSIUM CHLORIDE, CALCIUM CHLORIDE 600; 310; 30; 20 MG/100ML; MG/100ML; MG/100ML; MG/100ML
INJECTION, SOLUTION INTRAVENOUS CONTINUOUS
Status: DISCONTINUED | OUTPATIENT
Start: 2021-08-24 | End: 2021-08-24 | Stop reason: HOSPADM

## 2021-08-24 RX ORDER — GLYCOPYRROLATE 0.2 MG/ML
INJECTION, SOLUTION INTRAMUSCULAR; INTRAVENOUS PRN
Status: DISCONTINUED | OUTPATIENT
Start: 2021-08-24 | End: 2021-08-24

## 2021-08-24 RX ORDER — AMOXICILLIN 250 MG
1-2 CAPSULE ORAL DAILY PRN
Qty: 15 TABLET | Refills: 0 | Status: SHIPPED | OUTPATIENT
Start: 2021-08-24 | End: 2022-04-26

## 2021-08-24 RX ORDER — OXYCODONE HYDROCHLORIDE 5 MG/1
5-10 TABLET ORAL
Qty: 40 TABLET | Refills: 0 | Status: SHIPPED | OUTPATIENT
Start: 2021-08-24 | End: 2022-04-26

## 2021-08-24 RX ORDER — BUPROPION HYDROCHLORIDE 200 MG/1
1 TABLET, EXTENDED RELEASE ORAL 2 TIMES DAILY
COMMUNITY
Start: 2021-06-13

## 2021-08-24 RX ORDER — ONDANSETRON 2 MG/ML
INJECTION INTRAMUSCULAR; INTRAVENOUS PRN
Status: DISCONTINUED | OUTPATIENT
Start: 2021-08-24 | End: 2021-08-24

## 2021-08-24 RX ORDER — ACETAMINOPHEN 325 MG/1
650 TABLET ORAL EVERY 4 HOURS PRN
Qty: 100 TABLET | Refills: 0
Start: 2021-08-24

## 2021-08-24 RX ORDER — HYDROCHLOROTHIAZIDE 25 MG/1
25 TABLET ORAL DAILY
Status: DISCONTINUED | OUTPATIENT
Start: 2021-08-25 | End: 2021-08-25 | Stop reason: HOSPADM

## 2021-08-24 RX ORDER — IBUPROFEN 600 MG/1
600 TABLET, FILM COATED ORAL EVERY 6 HOURS PRN
Qty: 30 TABLET | Refills: 0
Start: 2021-08-24

## 2021-08-24 RX ORDER — CEFAZOLIN SODIUM 2 G/100ML
2 INJECTION, SOLUTION INTRAVENOUS EVERY 8 HOURS
Status: COMPLETED | OUTPATIENT
Start: 2021-08-24 | End: 2021-08-25

## 2021-08-24 RX ORDER — HYDROXYZINE HYDROCHLORIDE 25 MG/1
25-50 TABLET, FILM COATED ORAL EVERY 6 HOURS PRN
Qty: 40 TABLET | Refills: 0 | Status: ON HOLD | OUTPATIENT
Start: 2021-08-24 | End: 2022-08-24

## 2021-08-24 RX ORDER — GABAPENTIN 100 MG/1
100 CAPSULE ORAL
Status: COMPLETED | OUTPATIENT
Start: 2021-08-24 | End: 2021-08-24

## 2021-08-24 RX ORDER — FENTANYL CITRATE 50 UG/ML
25 INJECTION, SOLUTION INTRAMUSCULAR; INTRAVENOUS EVERY 5 MIN PRN
Status: DISCONTINUED | OUTPATIENT
Start: 2021-08-24 | End: 2021-08-24 | Stop reason: HOSPADM

## 2021-08-24 RX ORDER — MEPERIDINE HYDROCHLORIDE 25 MG/ML
12.5 INJECTION INTRAMUSCULAR; INTRAVENOUS; SUBCUTANEOUS
Status: DISCONTINUED | OUTPATIENT
Start: 2021-08-24 | End: 2021-08-24 | Stop reason: HOSPADM

## 2021-08-24 RX ORDER — ONDANSETRON 4 MG/1
4 TABLET, ORALLY DISINTEGRATING ORAL EVERY 30 MIN PRN
Status: DISCONTINUED | OUTPATIENT
Start: 2021-08-24 | End: 2021-08-24 | Stop reason: HOSPADM

## 2021-08-24 RX ORDER — PROCHLORPERAZINE MALEATE 5 MG
5 TABLET ORAL EVERY 6 HOURS PRN
Status: DISCONTINUED | OUTPATIENT
Start: 2021-08-24 | End: 2021-08-25 | Stop reason: HOSPADM

## 2021-08-24 RX ORDER — CEFAZOLIN SODIUM 2 G/100ML
2 INJECTION, SOLUTION INTRAVENOUS
Status: COMPLETED | OUTPATIENT
Start: 2021-08-24 | End: 2021-08-24

## 2021-08-24 RX ORDER — PROPOFOL 10 MG/ML
INJECTION, EMULSION INTRAVENOUS CONTINUOUS PRN
Status: DISCONTINUED | OUTPATIENT
Start: 2021-08-24 | End: 2021-08-24

## 2021-08-24 RX ORDER — TRANEXAMIC ACID 650 MG/1
1950 TABLET ORAL ONCE
Status: COMPLETED | OUTPATIENT
Start: 2021-08-24 | End: 2021-08-24

## 2021-08-24 RX ORDER — OXYCODONE HYDROCHLORIDE 5 MG/1
10 TABLET ORAL EVERY 4 HOURS PRN
Status: DISCONTINUED | OUTPATIENT
Start: 2021-08-24 | End: 2021-08-25 | Stop reason: HOSPADM

## 2021-08-24 RX ORDER — NALOXONE HYDROCHLORIDE 0.4 MG/ML
0.2 INJECTION, SOLUTION INTRAMUSCULAR; INTRAVENOUS; SUBCUTANEOUS
Status: DISCONTINUED | OUTPATIENT
Start: 2021-08-24 | End: 2021-08-25 | Stop reason: HOSPADM

## 2021-08-24 RX ORDER — DEXAMETHASONE SODIUM PHOSPHATE 10 MG/ML
INJECTION, SOLUTION INTRAMUSCULAR; INTRAVENOUS PRN
Status: DISCONTINUED | OUTPATIENT
Start: 2021-08-24 | End: 2021-08-24

## 2021-08-24 RX ORDER — DEXAMETHASONE SODIUM PHOSPHATE 4 MG/ML
INJECTION, SOLUTION INTRA-ARTICULAR; INTRALESIONAL; INTRAMUSCULAR; INTRAVENOUS; SOFT TISSUE PRN
Status: DISCONTINUED | OUTPATIENT
Start: 2021-08-24 | End: 2021-08-24

## 2021-08-24 RX ORDER — CEFAZOLIN SODIUM 2 G/100ML
2 INJECTION, SOLUTION INTRAVENOUS SEE ADMIN INSTRUCTIONS
Status: DISCONTINUED | OUTPATIENT
Start: 2021-08-24 | End: 2021-08-24 | Stop reason: HOSPADM

## 2021-08-24 RX ORDER — BUPROPION HYDROCHLORIDE 100 MG/1
200 TABLET, EXTENDED RELEASE ORAL 2 TIMES DAILY
Status: DISCONTINUED | OUTPATIENT
Start: 2021-08-25 | End: 2021-08-25 | Stop reason: HOSPADM

## 2021-08-24 RX ORDER — KETOROLAC TROMETHAMINE 15 MG/ML
15 INJECTION, SOLUTION INTRAMUSCULAR; INTRAVENOUS EVERY 6 HOURS
Status: DISCONTINUED | OUTPATIENT
Start: 2021-08-24 | End: 2021-08-25 | Stop reason: HOSPADM

## 2021-08-24 RX ORDER — FENTANYL CITRATE 50 UG/ML
INJECTION, SOLUTION INTRAMUSCULAR; INTRAVENOUS PRN
Status: DISCONTINUED | OUTPATIENT
Start: 2021-08-24 | End: 2021-08-24

## 2021-08-24 RX ORDER — LIDOCAINE 40 MG/G
CREAM TOPICAL
Status: DISCONTINUED | OUTPATIENT
Start: 2021-08-24 | End: 2021-08-24 | Stop reason: HOSPADM

## 2021-08-24 RX ORDER — POLYETHYLENE GLYCOL 3350 17 G/17G
17 POWDER, FOR SOLUTION ORAL DAILY
Status: DISCONTINUED | OUTPATIENT
Start: 2021-08-25 | End: 2021-08-25 | Stop reason: HOSPADM

## 2021-08-24 RX ORDER — HYDROMORPHONE HCL IN WATER/PF 6 MG/30 ML
0.2 PATIENT CONTROLLED ANALGESIA SYRINGE INTRAVENOUS EVERY 5 MIN PRN
Status: DISCONTINUED | OUTPATIENT
Start: 2021-08-24 | End: 2021-08-24 | Stop reason: HOSPADM

## 2021-08-24 RX ORDER — ROSUVASTATIN CALCIUM 20 MG/1
20 TABLET, COATED ORAL DAILY
Status: DISCONTINUED | OUTPATIENT
Start: 2021-08-25 | End: 2021-08-25 | Stop reason: HOSPADM

## 2021-08-24 RX ADMIN — LIDOCAINE HYDROCHLORIDE 0.1 ML: 10 INJECTION, SOLUTION EPIDURAL; INFILTRATION; INTRACAUDAL; PERINEURAL at 14:25

## 2021-08-24 RX ADMIN — KETAMINE HYDROCHLORIDE 20 MG: 10 INJECTION INTRAMUSCULAR; INTRAVENOUS at 15:56

## 2021-08-24 RX ADMIN — SODIUM CHLORIDE, POTASSIUM CHLORIDE, SODIUM LACTATE AND CALCIUM CHLORIDE: 600; 310; 30; 20 INJECTION, SOLUTION INTRAVENOUS at 16:31

## 2021-08-24 RX ADMIN — HYDROMORPHONE HYDROCHLORIDE 0.4 MG: 0.2 INJECTION, SOLUTION INTRAMUSCULAR; INTRAVENOUS; SUBCUTANEOUS at 21:40

## 2021-08-24 RX ADMIN — ACETAMINOPHEN 975 MG: 325 TABLET, FILM COATED ORAL at 13:58

## 2021-08-24 RX ADMIN — TRANEXAMIC ACID 1950 MG: 650 TABLET ORAL at 13:59

## 2021-08-24 RX ADMIN — KETOROLAC TROMETHAMINE 15 MG: 15 INJECTION, SOLUTION INTRAMUSCULAR; INTRAVENOUS at 19:14

## 2021-08-24 RX ADMIN — GABAPENTIN 100 MG: 100 CAPSULE ORAL at 13:58

## 2021-08-24 RX ADMIN — HYDROMORPHONE HYDROCHLORIDE 0.2 MG: 0.2 INJECTION, SOLUTION INTRAMUSCULAR; INTRAVENOUS; SUBCUTANEOUS at 18:58

## 2021-08-24 RX ADMIN — PHENYLEPHRINE HYDROCHLORIDE 100 MCG: 10 INJECTION INTRAVENOUS at 16:28

## 2021-08-24 RX ADMIN — BUPIVACAINE HYDROCHLORIDE IN DEXTROSE 1.8 ML: 7.5 INJECTION, SOLUTION SUBARACHNOID at 15:30

## 2021-08-24 RX ADMIN — ONDANSETRON 4 MG: 2 INJECTION INTRAMUSCULAR; INTRAVENOUS at 16:33

## 2021-08-24 RX ADMIN — MIDAZOLAM 1 MG: 1 INJECTION INTRAMUSCULAR; INTRAVENOUS at 15:29

## 2021-08-24 RX ADMIN — FENTANYL CITRATE 100 MCG: 50 INJECTION, SOLUTION INTRAMUSCULAR; INTRAVENOUS at 15:21

## 2021-08-24 RX ADMIN — ROPIVACAINE HYDROCHLORIDE 20 ML: 5 INJECTION, SOLUTION EPIDURAL; INFILTRATION; PERINEURAL at 17:20

## 2021-08-24 RX ADMIN — MAGNESIUM SULFATE HEPTAHYDRATE 2 G: 40 INJECTION, SOLUTION INTRAVENOUS at 15:45

## 2021-08-24 RX ADMIN — PHENYLEPHRINE HYDROCHLORIDE 100 MCG: 10 INJECTION INTRAVENOUS at 16:46

## 2021-08-24 RX ADMIN — MIDAZOLAM 2 MG: 1 INJECTION INTRAMUSCULAR; INTRAVENOUS at 15:21

## 2021-08-24 RX ADMIN — HYDROXYZINE HYDROCHLORIDE 25 MG: 25 TABLET, FILM COATED ORAL at 19:30

## 2021-08-24 RX ADMIN — PROPOFOL 50 MCG/KG/MIN: 10 INJECTION, EMULSION INTRAVENOUS at 15:30

## 2021-08-24 RX ADMIN — KETAMINE HYDROCHLORIDE 10 MG: 10 INJECTION INTRAMUSCULAR; INTRAVENOUS at 15:51

## 2021-08-24 RX ADMIN — GLYCOPYRROLATE 0.2 MG: 0.2 INJECTION, SOLUTION INTRAMUSCULAR; INTRAVENOUS at 16:20

## 2021-08-24 RX ADMIN — OXYCODONE HYDROCHLORIDE 5 MG: 5 TABLET ORAL at 18:59

## 2021-08-24 RX ADMIN — ACETAMINOPHEN 975 MG: 325 TABLET, FILM COATED ORAL at 21:41

## 2021-08-24 RX ADMIN — SODIUM CHLORIDE, POTASSIUM CHLORIDE, SODIUM LACTATE AND CALCIUM CHLORIDE 1000 ML: 600; 310; 30; 20 INJECTION, SOLUTION INTRAVENOUS at 14:25

## 2021-08-24 RX ADMIN — SODIUM CHLORIDE, POTASSIUM CHLORIDE, SODIUM LACTATE AND CALCIUM CHLORIDE: 600; 310; 30; 20 INJECTION, SOLUTION INTRAVENOUS at 19:09

## 2021-08-24 RX ADMIN — HYDROMORPHONE HYDROCHLORIDE 0.2 MG: 0.2 INJECTION, SOLUTION INTRAMUSCULAR; INTRAVENOUS; SUBCUTANEOUS at 19:31

## 2021-08-24 RX ADMIN — DEXAMETHASONE SODIUM PHOSPHATE 5 MG: 10 INJECTION, SOLUTION INTRAMUSCULAR; INTRAVENOUS at 17:20

## 2021-08-24 RX ADMIN — KETAMINE HYDROCHLORIDE 20 MG: 10 INJECTION INTRAMUSCULAR; INTRAVENOUS at 15:39

## 2021-08-24 RX ADMIN — DOCUSATE SODIUM AND SENNOSIDES 1 TABLET: 8.6; 5 TABLET ORAL at 19:30

## 2021-08-24 RX ADMIN — CEFAZOLIN SODIUM 2 G: 2 INJECTION, SOLUTION INTRAVENOUS at 23:56

## 2021-08-24 RX ADMIN — PHENYLEPHRINE HYDROCHLORIDE 100 MCG: 10 INJECTION INTRAVENOUS at 16:53

## 2021-08-24 RX ADMIN — PHENYLEPHRINE HYDROCHLORIDE 100 MCG: 10 INJECTION INTRAVENOUS at 16:03

## 2021-08-24 RX ADMIN — CEFAZOLIN SODIUM 2 G: 2 INJECTION, SOLUTION INTRAVENOUS at 15:36

## 2021-08-24 RX ADMIN — LIDOCAINE HYDROCHLORIDE 50 MG: 10 INJECTION, SOLUTION INFILTRATION; PERINEURAL at 15:30

## 2021-08-24 RX ADMIN — DEXAMETHASONE SODIUM PHOSPHATE 4 MG: 4 INJECTION, SOLUTION INTRA-ARTICULAR; INTRALESIONAL; INTRAMUSCULAR; INTRAVENOUS; SOFT TISSUE at 15:37

## 2021-08-24 RX ADMIN — PHENYLEPHRINE HYDROCHLORIDE 100 MCG: 10 INJECTION INTRAVENOUS at 16:20

## 2021-08-24 RX ADMIN — PHENYLEPHRINE HYDROCHLORIDE 100 MCG: 10 INJECTION INTRAVENOUS at 16:37

## 2021-08-24 RX ADMIN — PHENYLEPHRINE HYDROCHLORIDE 100 MCG: 10 INJECTION INTRAVENOUS at 16:41

## 2021-08-24 RX ADMIN — LIDOCAINE HYDROCHLORIDE,EPINEPHRINE BITARTRATE 5 ML: 20; .005 INJECTION, SOLUTION EPIDURAL; INFILTRATION; INTRACAUDAL; PERINEURAL at 17:19

## 2021-08-24 ASSESSMENT — MIFFLIN-ST. JEOR: SCORE: 1670.25

## 2021-08-24 NOTE — PROCEDURES
08/24/21    PREOPERATIVE DIAGNOSES: Left knee arthritis   POSTOPERATIVE DIAGNOSIS:  Left knee arthritis  PROCEDURE: Left total knee arthroplasty.   SURGEON: Nico Acosta MD   ASSISTANT: Maria Luisa Lyle PA-C The presence of the PA was necessary for safe progression of the case.   ANESTHESIA: Spinal with MAC.   ESTIMATED BLOOD LOSS: 50 mL.   TOURNIQUET TIME: 42 minutes at 250 mmHg.   COMPLICATIONS: None apparent.   DISPOSITION: Stable to PACU.    IMPLANTS USED: DePuy Attune size 8 posterior stabilized femoral component with a size 7 S+ tibial component, size 8 by 5mm posterior stabilized polyethylene and 41 mm patella.     INDICATIONS: Marshall is a 71 year old male with a history of progressive left knee pain due to end stage arthritis. Unfortunately, he failed conservative management including therapy and injections. After discussing risks, benefits and surgery, he elected to proceed with a left total knee replacement understanding the risks of infection, damage to vessels and nerves, blood clots, stiffness, ongoing pain, need for revision surgery, need for transfusion.     PROCEDURE: Marshall was brought to the preoperative holding area where the left knee was marked. Consent was reviewed. He then was transferred to the operating theater. After induction of successful spinal anesthesia, he was placed supine with a bump under the left hip.  A timeout was performed, verifying correct patient, surgery, location. He received preoperative antibiotics as well as transexemic acid. The left lower extremity was then prepped and draped in standard sterile fashion.     We exsanguinated the leg and inflated the tourniquet. We then made a longitudinal incision over the anterior aspect of the knee, utilizing his previous medial parapatellar incision for the mid portion of his TKA incision. Dissection was carried down through skin and subcutaneous tissue. A medial parapatellar arthrotomy was made, exposing end stage medial and  patellofemoral arthritis.  Synovial tissue from the suprapatellar pouch excised. The anterior horn of the medial meniscus was released and a gentle medial release was performed. Then, the remainder of the fat pad was excised. We turned our attention to the patella. Using a free hand technique, this was resected down to 12 mm and sized to a 41mm, then punched and a metal protector plate was placed. We then turned our attention to the femur. Preoperatively, there was a 9 degree flexion contracture.  Therefore, using an intramedullary alignment guide, 11 mm of distal femur was resected in 5 degrees of valgus.     We then turned our attention to the tibia.  An extramedullary alignment cut was used to resect 3mm off the low medial side, perpendicular to the mechanical axis.  We then turned our attention back to the distal femur and sized it to a size 8.  The epicondylar axis was established and we placed our 4-in-1 cutting block perpendicular to it, in 3 degrees of external rotation. With this in place, our anterior, posterior and chamfer distal femur cuts were made.  We then used a laminar  to open the joint in order to remove medial and lateral meniscus remnants as well as posterior osteophytes.  The jig was utilized to cut the distal femoral box for the PS component.  A variety of polyethylene were trialed, and we felt a 5mm was best, with range of motion from full extension to 135 of flexion, stability to varus and valgus stress, normal POLO test, and the patella tracked well.  After this, sized our tibial component to a 7.  We then drilled and punched our tibial component, lining it with the medial 1/3 of the tibial tubercle. The knee was thoroughly irrigated using pulse lavage. The final components were then cemented in place. All excess cement was removed and the knee was placed into full extension while the cement was curing. Also, the wound was instilled with dilute Betadine solution. Once the cement had  cured, we retrialed our components with a 5mm poly with findings as above. We then placed the final poly.  The tourniquet was deflated with hemostasis achieved.  The capsular layer was closed with #1 Stratafix, at which point there was 130 degrees of flexion with gravity.  Subcutaneous tissues with 2-0 Vicryl, skin with a 3-0 Monocryl. A sterile dressing was applied and the patient was awoken from anesthesia and transferred to PACU in stable condition.     POSTOPERATIVE PLAN:   1. Weightbearing as tolerated left lower extremity with PT for mobilization.   2. Deep venous thrombosis prophylaxis: Aspirin 325mg daily x 30 days   3. Perioperative antibiotics.   4. Follow up in 2 weeks for wound check.     ASHLEE ROLDAN MD

## 2021-08-24 NOTE — ANESTHESIA POSTPROCEDURE EVALUATION
Patient: Jay Carter    Procedure(s):  Left Total Knee Arthroplasty    Diagnosis:Degenerative joint disease [M19.90]  Diagnosis Additional Information: No value filed.    Anesthesia Type:  Spinal    Note:  Disposition: Outpatient   Postop Pain Control: Uneventful            Sign Out: Well controlled pain   PONV: No   Neuro/Psych: Uneventful            Sign Out: Acceptable/Baseline neuro status   Airway/Respiratory: Uneventful            Sign Out: Acceptable/Baseline resp. status   CV/Hemodynamics: Uneventful            Sign Out: Acceptable CV status; No obvious hypovolemia; No obvious fluid overload   Other NRE: NONE   DID A NON-ROUTINE EVENT OCCUR? No           Last vitals:  Vitals Value Taken Time   /67 08/24/21 1725   Temp     Pulse 66 08/24/21 1726   Resp 17 08/24/21 1726   SpO2 98 % 08/24/21 1726   Vitals shown include unvalidated device data.    Electronically Signed By: Austin Joyner CRNA, APRN CRNA  August 24, 2021  5:27 PM

## 2021-08-24 NOTE — ANESTHESIA PROCEDURE NOTES
Intrathecal injection Procedure Note  Pre-Procedure   Staff -        CRNA: Robbie Ennis APRN CRNA       Other Anesthesia Staff: Kaiden Garcia       Performed By: CRNA and SUHAS       Location: OR       Pre-Anesthestic Checklist: patient identified, IV checked, risks and benefits discussed, informed consent, monitors and equipment checked, pre-op evaluation, at physician/surgeon's request and post-op pain management  Timeout:       Correct Patient: Yes        Correct Procedure: Yes        Correct Site: Yes        Correct Position: Yes   Procedure Documentation  Procedure: intrathecal injection       Diagnosis: Spinal       Patient Position: sitting       Patient Prep/Sterile Barriers: sterile gloves, mask, patient draped       Skin prep: Betadine       Insertion Site: L3-4. (midline approach).       Needle Gauge: 25.        Needle Length (Inches): 3.5        Spinal Needle Type: Pencan       Introducer used       Introducer: 20 G       # of attempts: 1 and  # of redirects:  2    Assessment/Narrative         Paresthesias: No.       CSF fluid: clear.

## 2021-08-24 NOTE — ANESTHESIA PROCEDURE NOTES
Adductor canal Procedure Note  Pre-Procedure   Staff -        CRNA: Robbie Ennis APRN CRNA       Other Anesthesia Staff: Kaiden Garcia       Performed By: CRNA and SUHAS       Location: post-op       Procedure Start/Stop Times: 8/24/2021 5:15 PM and 8/24/2021 5:20 PM       Pre-Anesthestic Checklist: patient identified, IV checked, site marked, risks and benefits discussed, informed consent, monitors and equipment checked, pre-op evaluation, at physician/surgeon's request and post-op pain management  Timeout:       Correct Patient: Yes        Correct Procedure: Yes        Correct Site: Yes        Correct Position: Yes        Correct Laterality: Yes        Site Marked: Yes  Procedure Documentation  Procedure: Adductor canal       Diagnosis: ADDUCTOR CANAL,FEMORAL,AND SAPHENOUS       Laterality: left       Patient Position: supine       Patient Prep/Sterile Barriers: sterile gloves, mask, patient draped       Skin prep: DuraPrep and Chloraprep      Local skin infiltrated with 5 mL of.        Needle Type: short bevel       Needle Gauge: 20.        Needle Length (Inches): 4        Ultrasound guided       1. Ultrasound was used to identify targeted nerve, plexus, vascular marker, or fascial plane and place a needle adjacent to it in real-time.       2. Ultrasound was used to visualize the spread of anesthetic in close proximity to the above referenced structure.       3. A permanent image is entered into the patient's record.       4. The visualized anatomic structures appeared normal.       5. There were no apparent abnormal pathologic findings.    Assessment/Narrative         The placement was negative for: blood aspirated, painful injection and site bleeding       Paresthesias: No.    Test dose of mL lidocaine 2% w/ 1:200,000 epinephrine at.        .      Bolus given via needle. No blood aspirated via catheter.        Secured via.        Insertion/Infusion Method: Single Shot       Complications: none

## 2021-08-24 NOTE — PLAN OF CARE
"[facility] ADMISSION NOTE    Patient admitted to room 2407 at approximately 1840 via cart from surgery. Patient was accompanied by spouse and transport tech.     Verbal SBAR report received from Ruth PACU RN prior to patient arrival.     Patient trasferred to bed via air gisele. Patient alert and oriented X 2. Pain is not well controlled.  Medication(s) being used: acetaminophen and narcotic analgesics including oxycodone.  . Admission vital signs: Blood pressure 137/75, pulse 70, temperature 97.7  F (36.5  C), temperature source Axillary, resp. rate 14, height 1.753 m (5' 9\"), weight 93 kg (205 lb), SpO2 96 %. Patient was oriented to plan of care, call light, bed controls, tv, telephone, bathroom, and visiting hours.     Risk Assessment    The following safety risks were identified during admission: fall. Yellow risk band applied: YES.     Skin Initial Assessment    This writer admitted this patient and completed a full skin assessment and Jeff score in the Adult PCS flowsheet. Appropriate interventions initiated as needed.     Secondary skin check not completed.         Education    Patient has a [place] to Observation order: No  Observation education completed and documented: N/A      Akila Osuna RN      "

## 2021-08-24 NOTE — ANESTHESIA POSTPROCEDURE EVALUATION
Patient: Jay Carter    Procedure(s):  Left Total Knee Arthroplasty    Diagnosis:Degenerative joint disease [M19.90]  Diagnosis Additional Information: No value filed.    Anesthesia Type:  Spinal    Note:  Disposition: Outpatient   Postop Pain Control: Uneventful            Sign Out: Well controlled pain   PONV: No   Neuro/Psych: Uneventful            Sign Out: Acceptable/Baseline neuro status   Airway/Respiratory: Uneventful            Sign Out: Acceptable/Baseline resp. status   CV/Hemodynamics: Uneventful            Sign Out: Acceptable CV status; No obvious hypovolemia; No obvious fluid overload   Other NRE: NONE   DID A NON-ROUTINE EVENT OCCUR? No           Last vitals:  Vitals Value Taken Time   /67 08/24/21 1725   Temp     Pulse 66 08/24/21 1726   Resp 17 08/24/21 1726   SpO2 98 % 08/24/21 1726   Vitals shown include unvalidated device data.    Electronically Signed By: Austin Joyner CRNA, APRN CRNA  August 24, 2021  5:28 PM

## 2021-08-24 NOTE — ANESTHESIA CARE TRANSFER NOTE
Patient: Jay Carter    Procedure(s):  Left Total Knee Arthroplasty    Diagnosis: Degenerative joint disease [M19.90]  Diagnosis Additional Information: No value filed.    Anesthesia Type:   Spinal     Note:    Oropharynx: oropharynx clear of all foreign objects  Level of Consciousness: awake  Oxygen Supplementation: face mask  Level of Supplemental Oxygen (L/min / FiO2): 4  Independent Airway: airway patency satisfactory and stable  Dentition: dentition unchanged  Vital Signs Stable: post-procedure vital signs reviewed and stable  Report to RN Given: handoff report given  Patient transferred to: Phase II    Handoff Report: Identifed the Patient, Identified the Reponsible Provider, Reviewed the pertinent medical history, Discussed the surgical course, Reviewed Intra-OP anesthesia mangement and issues during anesthesia, Set expectations for post-procedure period and Allowed opportunity for questions and acknowledgement of understanding      Vitals:  Vitals Value Taken Time   /67 08/24/21 1720   Temp     Pulse 65 08/24/21 1725   Resp 9 08/24/21 1725   SpO2 98 % 08/24/21 1725   Vitals shown include unvalidated device data.    Electronically Signed By: Austin Joyner CRNA, APRN CRNA  August 24, 2021  5:26 PM

## 2021-08-25 ENCOUNTER — APPOINTMENT (OUTPATIENT)
Dept: PHYSICAL THERAPY | Facility: CLINIC | Age: 71
End: 2021-08-25
Attending: ORTHOPAEDIC SURGERY
Payer: COMMERCIAL

## 2021-08-25 VITALS
DIASTOLIC BLOOD PRESSURE: 53 MMHG | OXYGEN SATURATION: 92 % | RESPIRATION RATE: 16 BRPM | HEIGHT: 69 IN | TEMPERATURE: 98.2 F | HEART RATE: 82 BPM | WEIGHT: 205 LBS | SYSTOLIC BLOOD PRESSURE: 104 MMHG | BODY MASS INDEX: 30.36 KG/M2

## 2021-08-25 LAB
FASTING STATUS PATIENT QL REPORTED: ABNORMAL
GLUCOSE BLD-MCNC: 145 MG/DL (ref 70–99)
HGB BLD-MCNC: 12.2 G/DL (ref 13.3–17.7)

## 2021-08-25 PROCEDURE — 36415 COLL VENOUS BLD VENIPUNCTURE: CPT | Performed by: ORTHOPAEDIC SURGERY

## 2021-08-25 PROCEDURE — 85018 HEMOGLOBIN: CPT | Performed by: ORTHOPAEDIC SURGERY

## 2021-08-25 PROCEDURE — 99213 OFFICE O/P EST LOW 20 MIN: CPT

## 2021-08-25 PROCEDURE — 97110 THERAPEUTIC EXERCISES: CPT | Mod: GP

## 2021-08-25 PROCEDURE — 250N000011 HC RX IP 250 OP 636: Performed by: ORTHOPAEDIC SURGERY

## 2021-08-25 PROCEDURE — 250N000013 HC RX MED GY IP 250 OP 250 PS 637: Performed by: PHYSICIAN ASSISTANT

## 2021-08-25 PROCEDURE — 82947 ASSAY GLUCOSE BLOOD QUANT: CPT | Performed by: ORTHOPAEDIC SURGERY

## 2021-08-25 PROCEDURE — 250N000013 HC RX MED GY IP 250 OP 250 PS 637: Performed by: ORTHOPAEDIC SURGERY

## 2021-08-25 PROCEDURE — 99207 PR CDG-CODE CATEGORY CHANGED: CPT

## 2021-08-25 PROCEDURE — 97161 PT EVAL LOW COMPLEX 20 MIN: CPT | Mod: GP

## 2021-08-25 PROCEDURE — 97116 GAIT TRAINING THERAPY: CPT | Mod: GP

## 2021-08-25 RX ADMIN — ASPIRIN 325 MG: 325 TABLET, COATED ORAL at 05:28

## 2021-08-25 RX ADMIN — ENALAPRIL MALEATE 20 MG: 10 TABLET ORAL at 08:09

## 2021-08-25 RX ADMIN — KETOROLAC TROMETHAMINE 15 MG: 15 INJECTION, SOLUTION INTRAMUSCULAR; INTRAVENOUS at 01:18

## 2021-08-25 RX ADMIN — DOCUSATE SODIUM AND SENNOSIDES 1 TABLET: 8.6; 5 TABLET ORAL at 08:11

## 2021-08-25 RX ADMIN — ROSUVASTATIN CALCIUM 20 MG: 20 TABLET, FILM COATED ORAL at 08:09

## 2021-08-25 RX ADMIN — POLYETHYLENE GLYCOL 3350 17 G: 17 POWDER, FOR SOLUTION ORAL at 10:15

## 2021-08-25 RX ADMIN — CEFAZOLIN SODIUM 2 G: 2 INJECTION, SOLUTION INTRAVENOUS at 08:05

## 2021-08-25 RX ADMIN — KETOROLAC TROMETHAMINE 15 MG: 15 INJECTION, SOLUTION INTRAMUSCULAR; INTRAVENOUS at 08:02

## 2021-08-25 RX ADMIN — OXYCODONE HYDROCHLORIDE 5 MG: 5 TABLET ORAL at 12:39

## 2021-08-25 RX ADMIN — ACETAMINOPHEN 975 MG: 325 TABLET, FILM COATED ORAL at 05:27

## 2021-08-25 RX ADMIN — HYDROCHLOROTHIAZIDE 25 MG: 25 TABLET ORAL at 08:09

## 2021-08-25 RX ADMIN — BUPROPION HYDROCHLORIDE 200 MG: 100 TABLET, EXTENDED RELEASE ORAL at 10:14

## 2021-08-25 NOTE — PLAN OF CARE
[facility] DISCHARGE NOTE    Patient discharged to home at 1:42 PM via wheel chair. Accompanied by significant other and staff. Discharge instructions reviewed with patient, opportunity offered to ask questions. Prescriptions filled and sent with patient upon discharge. All belongings sent with patient.    Sheri Aranda RN

## 2021-08-25 NOTE — PLAN OF CARE
"Patient vital signs are at baseline: Yes /60 (BP Location: Left arm)   Pulse 97   Temp 98.6  F (37  C) (Oral)   Resp 15   Ht 1.753 m (5' 9\")   Wt 93 kg (205 lb)   SpO2 95%   BMI 30.27 kg/m      Patient able to ambulate as they were prior to admission or with assist devices provided by therapies during their stay:  Yes. Walked in harper 50ft w/ 1 assist, belt & walker.  Patient MUST void prior to discharge:  Yes. Up to BR twice tonight, voiding good amounts. Passed flatus this am.  Patient able to tolerate oral intake:  Yes. Ate cream of chicken soup & crackers before bedtime.  Pain has adequate pain control using Oral analgesics:  Yes. Getting good pain relief w/ scheduled ASA, tylenol, & prn oxycodone.   "

## 2021-08-25 NOTE — PROGRESS NOTES
[facility]      OUTPATIENT PHYSICAL THERAPY EVALUATION  PLAN OF TREATMENT FOR OUTPATIENT REHABILITATION  (COMPLETE FOR INITIAL CLAIMS ONLY)  Patient's Last Name, First Name, M.I.  YOB: 1950  Jay Carter                        Provider's Name  [facility] Medical Record No.  [id number]                               Onset Date:  08/24/21   Start of Care Date:  (P) 08/25/21      Type:     _X_PT   ___OT   ___SLP Medical Diagnosis:  (P) S/p L TKA                        PT Diagnosis:  S/p L TKA   Visits from SOC:  1   _________________________________________________________________________________  Plan of Treatment/Functional Goals    Planned Interventions: bed mobility training, gait training, home exercise program, transfer training, strengthening, stair training     Goals: See Physical Therapy Goals on Care Plan in [employer] electronic health record.    Therapy Frequency: One time eval and treatment only  Predicted Duration of Therapy Intervention: 1 day  _________________________________________________________________________________    I CERTIFY THE NEED FOR THESE SERVICES FURNISHED UNDER        THIS PLAN OF TREATMENT AND WHILE UNDER MY CARE     (Physician co-signature of this document indicates review and certification of the therapy plan).                Certification date from: (P) 08/25/21, Certification date to: (P) 08/26/21    Referring Physician: Dr. Nico Acosta            Initial Assessment        See Physical Therapy evaluation dated (P) 08/25/21 in [employer] electronic health record.

## 2021-08-25 NOTE — PROGRESS NOTES
[facility] Medicine Progress Note  Date of Service: 08/25/2021    Assessment & Plan           Marshall Carter is a 71-year-old man who underwent left total knee arthroplasty yesterday, 8/25/2021.  His postoperative course has been uneventful, and he is being discharged this afternoon by the orthopedic service.  The hospitalist service has been asked to review discharge medications.    #Hypertension  Managed prior to admission with enalapril 20 mg daily and hydrochlorothiazide 25 mg daily, both reordered here.  Blood pressure control here has been good.  -Continue preadmission enalapril and hydrochlorothiazide unchanged.    #Hyperlipidemia  Managed prior to admission with rosuvastatin 20 mg daily, resumed here.  -Continue rosuvastatin unchanged.    #Major depressive disorder  Managed prior to admission with bupropion 200 mg twice daily at 0800 and 1200, continued here.  The patient reports no recent deterioration or change in mood.  -Continue bupropion.    #Post-operative anemia due to blood loss  Preoperative hemoglobin on 8/10/2021 was 14.9.  Postoperative hemoglobin today is 12.2, likely representing a small amount of perioperative blood loss.  Aside from very mild, generalized swelling around the incision of the left knee, there is no evidence of ecchymosis.  -Recheck hemoglobin as an outpatient after discharge.      Diet: Advance Diet as Tolerated: Regular Diet Adult  Regular Diet Adult    DVT Prophylaxis: Aspirin 325 mg daily for 30 days prescribed by the orthopedic service.  Contreras Catheter: Not present  Central Lines: None  Code Status: Full Code           Discussion: The patient appears medically stable on postoperative day 1 after elective left total knee arthroplasty.  He has been discharged by the orthopedic service.  He is stable for discharge from a medical standpoint as well.    Disposition: Anticipate discharge today to home.     Attestation:  I have reviewed  today's vital signs, notes, medications, labs and imaging.  Amount of time performed on this hospital visit: 25 minutes.    Natanael Smyth MD       Interval History   He reports that the spinal anesthetic wore off faster than he thought it would, resulting in some fairly severe knee pain this morning, now improved after oxycodone.  He denies dyspnea, cough, sputum production, or wheeze.  He has no exertional chest pain, and denies palpitations or orthopnea.  His appetite is good.  He denies dysphagia, nausea, vomiting, or diarrhea.  He has not had a postoperative bowel movement, though just took a stool softener and anticipates response.  He has no musculoskeletal pain besides his left knee.    Physical Exam   Temp:  [97.4  F (36.3  C)-98.6  F (37  C)] 98.2  F (36.8  C)  Pulse:  [64-97] 82  Resp:  [14-18] 16  BP: (104-148)/(53-86) 104/53  SpO2:  [92 %-99 %] 92 %    Weights:   Vitals:    08/24/21 1336   Weight: 93 kg (205 lb)    Body mass index is 30.27 kg/m .    GENERAL: Very pleasant man, seated in a recliner.  He looks well.  RESP: No accessory muscle use.  Lungs clear throughout on inspiration and expiration.  Expiration not prolonged, no wheeze.  CV: Regular rate and rhythm, non-tachycardic.  Normal S1 S2, no murmur or extra sound.  Trace left lower extremity edema, none on right.  ABDOMEN: Soft, non-tender, no guarding.  Bowel sounds positive.  MS: No bony deformities noted.  A vertical bandage overlies the incision over the left knee.  There is mild, generalized swelling around that incision, though there is no erythema or warmth, and only mild tenderness to palpation.  NEURO: Alert, oriented, conversant.  Cranial nerves III - XII grossly intact.  No gross motor or sensory deficits.    PSYCH: Calm, alert, conversant.  Able to articulate logical thoughts, no tangential thoughts, no hallucinations or delusions.  Affect normal.        Data   Recent Labs   Lab 08/25/21  0423   HGB 12.2*   *        Recent Labs   Lab 08/25/21  0423   *        Unresulted Labs Ordered in the Past 30 Days of this Admission     No orders found for last 31 day(s).           Imaging  Recent Results (from the past 24 hour(s))   XR Knee Port Left 1/2 Views    Narrative    EXAM: XR KNEE PORT LEFT 1/2 VIEWS  LOCATION: [facility]  DATE/TIME: 8/24/2021 5:33 PM    INDICATION: Post-Op Total Knee  COMPARISON: None.      Impression    IMPRESSION: Recent postoperative changes of knee arthroplasty. Component alignment satisfactory. No acute fracture. Postoperative gas in the joint and soft tissues.        I reviewed all new labs and imaging results over the last 24 hours. I personally reviewed no images or EKG's today.    Medications     lactated ringers 75 mL/hr at 08/24/21 1909       acetaminophen  975 mg Oral Q8H     aspirin  325 mg Oral Daily     buPROPion  200 mg Oral BID     enalapril  20 mg Oral Daily     hydrochlorothiazide  25 mg Oral Daily     ketorolac  15 mg Intravenous Q6H     polyethylene glycol  17 g Oral Daily     rosuvastatin  20 mg Oral Daily     senna-docusate  1 tablet Oral BID     sodium chloride (PF)  3 mL Intracatheter Q8H       Natanael Smyth MD    [facility] Medicine

## 2021-08-25 NOTE — PLAN OF CARE
Physical Therapy Discharge Summary    Reason for therapy discharge:    Discharged to home with outpatient therapy.    Progress towards therapy goal(s). See goals on Care Plan in [facility] electronic health record for goal details.  Goals met    Therapy recommendation(s):    Continued therapy is recommended.  Rationale/Recommendations:  OP PT to maximize strength and mobility.

## 2021-08-25 NOTE — PROGRESS NOTES
"[facility] Orthopaedics Progress Note      Post-operative Day: 1 Day Post-Op    Procedure(s):  Left Total Knee Arthroplasty  Subjective:    Pt reports minimal pain in the knee, overall he is very happy with his experience and care. He already has outpatient physical therapy set up after discharge and hopes to go home later today.    Chest pain, SOB:  No  Nausea, vomiting:  No  Lightheadedness, dizziness:  No  Neuro:  Patient denies new onset numbness or paresthesias      Objective:  Blood pressure 116/54, pulse 84, temperature 98.4  F (36.9  C), temperature source Oral, resp. rate 16, height 1.753 m (5' 9\"), weight 93 kg (205 lb), SpO2 92 %.    Patient Vitals for the past 24 hrs:   BP Temp Temp src Pulse Resp SpO2 Height Weight   08/25/21 0807 116/54 -- -- 84 -- -- -- --   08/25/21 0650 109/54 98.4  F (36.9  C) Oral 92 16 92 % -- --   08/25/21 0336 111/60 98.6  F (37  C) Oral 97 15 95 % -- --   08/25/21 0017 -- -- -- -- -- 97 % -- --   08/24/21 2303 125/68 98  F (36.7  C) Oral 96 18 94 % -- --   08/24/21 2152 -- -- -- -- -- 96 % -- --   08/24/21 2016 (!) 148/63 97.4  F (36.3  C) Oral 64 18 99 % -- --   08/24/21 1821 -- -- -- -- -- 96 % -- --   08/24/21 1800 137/75 -- -- 70 14 97 % -- --   08/24/21 1745 135/77 -- -- 71 14 97 % -- --   08/24/21 1730 123/70 -- -- 69 15 98 % -- --   08/24/21 1720 123/67 -- -- 69 18 97 % -- --   08/24/21 1715 116/67 -- -- 76 18 96 % -- --   08/24/21 1709 119/86 97.7  F (36.5  C) Axillary 75 14 97 % -- --   08/24/21 1336 135/78 98.4  F (36.9  C) Oral -- 16 97 % 1.753 m (5' 9\") 93 kg (205 lb)       Wt Readings from Last 4 Encounters:   08/24/21 93 kg (205 lb)   10/26/19 93 kg (205 lb)   05/25/19 95.3 kg (210 lb)   01/31/19 98.2 kg (216 lb 7.9 oz)       Gen: A&O x 3. NAD. Appears comfortable.   Wound status: Covered, Aquacel dressing in place.   Circulation, motion and sensation: Dorsiflexion/plantarflexion intact and equal bilaterally; distal lower extremity sensation is intact and equal " bilaterally. Foot and toes are warm and well perfused.    Swelling: Mild  Calf tenderness: calves are soft and non-tender bilaterally     Pertinent Labs   Lab Results: personally reviewed.     Recent Labs   Lab Test 08/25/21  0423 10/26/19  1907 02/07/19  0634 02/06/19  1020 01/29/18  1009   HGB 12.2* 14.7 13.7*  --   --    HCT  --  43.8  --   --   --    MCV  --  92  --   --   --    PLT  --  239  --  238  --    NA  --   --   --   --  136       Plan:   Continue current cares and rehabilitation.  Anticoagulation protocol:  mg daily  x 30  days  Pain medications:  oxycodone and tylenol  Weight bearing status:  WBAT  Disposition:  Plan for discharge to home with outpatient therapy when medically stable and pain is controlled, cleared by PT. Likely later today.                 Report completed by:  Eben Valadez PA-C  Date: 8/25/2021  Time: 8:33 AM

## 2021-08-25 NOTE — PROGRESS NOTES
Patient vital signs are at baseline: Yes  Patient able to ambulate as they were prior to admission or with assist devices provided by therapies during their stay:  No,  Reason:  Stood at bedside, ambulated in room w/ walker, belt & 2 assist  Patient MUST void prior to discharge:  Yes  Patient able to tolerate oral intake:  Yes  Pain has adequate pain control using Oral analgesics:  No,  Reason:  Using IV dilaudid 0.4 in addition to Scheduled Tylenol, Toradol & prn oxycodone.

## 2021-08-25 NOTE — PROGRESS NOTES
Initial IP Physical Therapy Evaluation       08/25/21 0838   Quick Adds   Type of Visit Initial PT Evaluation   Living Environment   People in home significant other   Current Living Arrangements house   Home Accessibility stairs within home   Number of Stairs, Within Home, Primary 7   Living Environment Comments 4 level home but will be able to live on one level, needs to be able to do 7 stairs   Self-Care   Usual Activity Tolerance good   Current Activity Tolerance moderate   Equipment Currently Used at Home cane, straight;shower chair;walker, rolling   Activity/Exercise/Self-Care Comment Owns 4WW that he plans to use, walk in shower with shower chair   Disability/Function   Hearing Difficulty or Deaf no   Wear Glasses or Blind yes   Vision Management glasses   Concentrating, Remembering or Making Decisions Difficulty no   Difficulty Communicating no   Walking or Climbing Stairs Difficulty no   Dressing/Bathing Difficulty no   Toileting issues no   Doing Errands Independently Difficulty (such as shopping) no   Fall history within last six months no   Change in Functional Status Since Onset of Current Illness/Injury yes   General Information   Onset of Illness/Injury or Date of Surgery 08/24/21   Referring Physician Dr. Nico Acosta   Patient/Family Therapy Goals Statement (PT) Go home today   Pertinent History of Current Problem (include personal factors and/or comorbidities that impact the POC) Patient is 72 y/o s/p L TKA on 8/24/21   Existing Precautions/Restrictions no known precautions/restrictions   Cognition   Orientation Status (Cognition) oriented x 4   Affect/Mental Status (Cognition) WNL   Follows Commands (Cognition) WNL   Pain Assessment   Patient Currently in Pain Yes, see Vital Sign flowsheet   Integumentary/Edema   Integumentary/Edema no deficits were identifed   Posture    Posture Not impaired   Range of Motion (ROM)   ROM Comment L knee flexion to 82 degrees   Strength   Strength Comments L knee  extension 3-/5, partial ROM   Bed Mobility   Comment (Bed Mobility) Supine<>sit SBA, safe and efficient hooking RLE under LLE to self-assist   Transfers   Transfer Safety Comments Sit<>stand SBA with FWW, good body mechanics   Gait/Stairs (Locomotion)   [place] Level (Gait) supervision   Assistive Device (Gait) walker, front-wheeled   Distance in Feet (Required for LE Total Joints) 250'   Pattern (Gait) step-through   Negotiation (Stairs) stairs independence;handrail location;number of steps;stairs assistive device   [place] Level (Stairs) supervision   Assistive Device (Stairs) cane, straight   Handrail Location (Stairs) right side (ascending)   Number of Steps (Stairs) 10 total   Comment (Gait/Stairs) Patient ambulates at safe veda with FWW, able to fully bear weight onto surgical limb. Cuing for walker positioning, advancement and weight shifting, tolerated very well with minimal increase in pain. Stairs: Step to pattern with one rail and single end cane, VC for hand positioning and cane sequencing   Balance   Balance no deficits were identified   Sensory Examination   Sensory Perception WNL   Coordination   Coordination no deficits were identified   Muscle Tone   Muscle Tone no deficits were identified   Clinical Impression   Criteria for Skilled Therapeutic Intervention yes, treatment indicated   PT Diagnosis (PT) S/p L TKA   Influenced by the following impairments Weakness, decreased activity tolerance, pain   Functional limitations due to impairments Mobility, transfers   Clinical Presentation Stable/Uncomplicated   Clinical Presentation Rationale Clinical judgment   Clinical Decision Making (Complexity) low complexity   Therapy Frequency (PT) One time eval and treatment only   Predicted Duration of Therapy Intervention (days/wks) 1 day   Planned Therapy Interventions (PT) bed mobility training;gait training;home exercise program;transfer training;strengthening;stair training   Anticipated  Equipment Needs at Discharge (PT) walker, rolling   Risk & Benefits of therapy have been explained evaluation/treatment results reviewed;care plan/treatment goals reviewed;participants voiced agreement with care plan;current/potential barriers reviewed;risks/benefits reviewed;participants included;patient   PT Discharge Planning    PT Discharge Recommendation (DC Rec) home with outpatient physical therapy   PT Rationale for DC Rec Patient demonstrates safe ambulation technique and can safely complete stairs to discharge home. OP PT recommended to maximize strength and mobility.   PT Brief overview of current status  SBA with FWW ambulation 250', SBA stairs with cane   Total Evaluation Time   Total Evaluation Time (Minutes) 10     Parviz Melendez, PT, DPT

## 2021-08-25 NOTE — DISCHARGE SUMMARY
[facility] Discharge Summary                                  [facility]     DANNY ESTEBAN [id number]   Age: 71 year old  PCP: Wegener, Brita L, [phone] 1950     Date of Admission:  8/24/2021  Date of Discharge::  8/25/2021  Discharge Physician:  Eben Valadez PA-C    Code status:  Full Code    Admission Information:  Admission Diagnosis:  Degenerative joint disease [M19.90]  Status post total knee replacement [Z96.659]    Post-Operative Day: 1 Day Post-Op     Reason for admission:  The patient was admitted for the following:Procedure(s) (LRB):  Left Total Knee Arthroplasty (Left)    Active Problems:    Status post total knee replacement      Allergies:  Ampicillin, Vancomycin, Ciprofloxacin, Simvastatin, and Adhesive tape    Following the procedure noted above the patient was transferred to the post-op floor and started on:    Therapy:  physical therapy and occupational therapy  Anticoagulation Plan:  mg daily  for 30 days  Pain Management: oxycodone and tylenol  Weight bearing status: Weight bearing as tolerated     The patient was followed and co-managed by the hospitalist service during the inpatient treatment course  Complications:  None  Consultations:  None     Pertinent Labs   Lab Results: personally reviewed.     Recent Labs   Lab Test 08/25/21  0423 10/26/19  1907 02/07/19  0634 02/06/19  1020 01/29/18  1009   HGB 12.2* 14.7 13.7*  --   --    HCT  --  43.8  --   --   --    MCV  --  92  --   --   --    PLT  --  239  --  238  --    NA  --   --   --   --  136          Discharge Information:  Condition at discharge: Stable  Discharge destination:  Discharged to home     Medications at discharge:  Current Discharge Medication List      START taking these medications    Details   acetaminophen (TYLENOL) 325 MG tablet Take 2 tablets (650 mg) by mouth every 4 hours as needed for other (mild pain)  Qty: 100 tablet, Refills: 0    Associated Diagnoses: Status post  right knee replacement      aspirin (ASA) 325 MG EC tablet Take 1 tablet (325 mg) by mouth daily  Qty: 30 tablet, Refills: 0    Associated Diagnoses: Status post right knee replacement      hydrOXYzine (ATARAX) 25 MG tablet Take 1-2 tablets (25-50 mg) by mouth every 6 hours as needed for itching or anxiety (with pain, moderate pain)  Qty: 40 tablet, Refills: 0    Associated Diagnoses: Status post right knee replacement      ibuprofen (ADVIL/MOTRIN) 600 MG tablet Take 1 tablet (600 mg) by mouth every 6 hours as needed for pain (mild)  Qty: 30 tablet, Refills: 0    Associated Diagnoses: Status post right knee replacement      oxyCODONE (ROXICODONE) 5 MG tablet Take 1-2 tablets (5-10 mg) by mouth every 3 hours as needed for pain (Moderate to Severe)  Qty: 40 tablet, Refills: 0    Associated Diagnoses: Status post right knee replacement      senna-docusate (SENOKOT-S/PERICOLACE) 8.6-50 MG tablet Take 1-2 tablets by mouth daily as needed for constipation Take while on oral narcotics to prevent or treat constipation.  Qty: 15 tablet, Refills: 0    Comments: While taking narcotics  Associated Diagnoses: Status post right knee replacement         CONTINUE these medications which have NOT CHANGED    Details   buPROPion (WELLBUTRIN SR) 200 MG 12 hr tablet Take 1 tablet by mouth 2 times daily AM and noon      cholecalciferol 50 MCG (2000 UT) CAPS Take 2,000 Units by mouth daily       enalapril (VASOTEC) 20 MG tablet Take 20 mg by mouth daily       hydrochlorothiazide (HYDRODIURIL) 25 MG tablet Take 25 mg by mouth daily       Multiple Vitamins-Minerals (GILBERT MULTIVITAMIN FOR MEN) TABS Take 1 tablet by mouth daily       omeprazole (PRILOSEC OTC) 20 MG EC tablet Take 20 mg by mouth twice a week On Sunday and Thursday      rosuvastatin (CRESTOR) 20 MG tablet Take 1 tablet by mouth daily       triamcinolone (NASACORT) 55 MCG/ACT nasal aerosol Spray 2 sprays in nostril every evening                         Follow-Up Care:  Patient  should be seen in the office in 14 days by the Orthopedic Surgeon/Physician Assistant.  Call [phone] for appointment or questions.    Eben Valadez PA-C

## 2021-08-25 NOTE — PROGRESS NOTES
Care Management Note:    Care Management team received referral to assist pt with Home Care services post surgical services.    Per IDT rounds, EMR review, and/or discussion with PT/OT staff, it has been determined that pt will discharge to home and attend outpatient therapy services.    Care Management will close referral at this time.    ROSALIO Dimas  [facility] [phone]   [facility]  [phone]

## 2021-08-31 ENCOUNTER — TRANSCRIBE ORDERS (OUTPATIENT)
Dept: OTHER | Age: 71
End: 2021-08-31

## 2021-08-31 DIAGNOSIS — Z96.652 S/P TOTAL KNEE ARTHROPLASTY, LEFT: Primary | ICD-10-CM

## 2021-09-01 ENCOUNTER — HOSPITAL ENCOUNTER (OUTPATIENT)
Dept: PHYSICAL THERAPY | Facility: CLINIC | Age: 71
Setting detail: THERAPIES SERIES
End: 2021-09-01
Attending: ORTHOPAEDIC SURGERY
Payer: COMMERCIAL

## 2021-09-01 DIAGNOSIS — Z96.652 S/P TOTAL KNEE ARTHROPLASTY, LEFT: ICD-10-CM

## 2021-09-01 PROCEDURE — 97110 THERAPEUTIC EXERCISES: CPT | Mod: GP | Performed by: PHYSICAL THERAPIST

## 2021-09-01 PROCEDURE — 97161 PT EVAL LOW COMPLEX 20 MIN: CPT | Mod: GP | Performed by: PHYSICAL THERAPIST

## 2021-09-01 NOTE — PROGRESS NOTES
[facility]          OUTPATIENT PHYSICAL THERAPY ORTHOPEDIC EVALUATION  PLAN OF TREATMENT FOR OUTPATIENT REHABILITATION  (COMPLETE FOR INITIAL CLAIMS ONLY)  Patient's Last Name, First Name, M.I.  YOB: 1950  Jay Carter    Provider s Name:  [facility]   Medical Record No.  [id number]   Start of Care Date:  09/01/21   Onset Date:  08/24/21   Type:     _X__PT   ___OT   ___SLP Medical Diagnosis:  s/p L TKA     PT Diagnosis:  s/p L TKA   Visits from SOC:  1      _________________________________________________________________________________  Plan of Treatment/Functional Goals:  manual therapy, neuromuscular re-education, ROM, strengthening, stretching      Goals  Goal Identifier: 1.   Goal Description: Pt will walk w/o device w/ slight to no limp  Target Date: 10/01/21    Goal Identifier: 2.  Goal Description: Pt will be able to do stairs reciprocal w/ 1 rail and minimal difficulty  Target Date: 10/31/21    Goal Identifier: 3.  Goal Description: Pt will have increased KF to 120* for improved bend w/ ADL's  Target Date: 10/31/21    Goal Identifier: 4.  Goal Description: Pt will wake no > 4X/wk due to knee and report increased ease getting back to sleep  Target Date: 10/31/21    Goal Identifier: 5.  Goal Description: Pt will be independent and consistent w/HEP to manage symptoms  Target Date: 10/31/21       Therapy Frequency:  2 times/Week  Predicted Duration of Therapy Intervention:  6 weeks then 1X/wk X 2 weeks = 14 visits    Daksha Abbott, PT                 I CERTIFY THE NEED FOR THESE SERVICES FURNISHED UNDER        THIS PLAN OF TREATMENT AND WHILE UNDER MY CARE .             Physician Signature               Date    X_____________________________________________________                             Certification Date From:  09/01/21   Certification Date To:  10/31/21    Referring Provider:  Nico Acosta MD    Initial  Assessment        See Epic Evaluation Start of Care Date: 09/01/21

## 2021-09-01 NOTE — PROGRESS NOTES
09/01/21 1600   General Information   Type of Visit Initial OP Ortho PT Evaluation   Start of Care Date 09/01/21   Referring Physician Nico Acosta MD   Patient/Family Goals Statement To see what I can do   Orders Evaluate and Treat   Date of Order 08/31/21   Certification Required? Yes   Medical Diagnosis L TKA   Body Part(s)   Body Part(s) Knee   Presentation and Etiology   Pertinent history of current problem (include personal factors and/or comorbidities that impact the POC) Pt is s/p L TKA on 8/24/21.  Pt notes intermittent pain 6/10.   Pt notes ongoing swelling.   Meds:  tylenol, aspirin.  PMHX:   prostate cancer.  B RCR.  back surgery for disc,  R ankle surgery, depression.  Low complexity   Impairments A. Pain;B. Decreased WB tolerance;C. Swelling   Onset date of current episode/exacerbation 08/24/21   Pain quality C. Aching   Pain exacerbation comment difficulty getting comfortable to sleep,  gets up every 2-3 hours to go to bathroom and then takes an hour to get back to sleep.  Walking w/ cane or walker.  Marked time on stairs.  Not driving.     Pain/symptoms eased by F. Certain positions;H. Cold;I. OTC medication(s)   Progression of symptoms since onset: Improved  (but notes a flareup over the weekend out looking for cat)   Prior Level of Function   Functional Level Prior Comment camping, kayaking, hiking   Current Level of Function   Patient role/employment history F. Retired   Fall Risk Screen   Fall screen completed by PT   Have you fallen 2 or more times in the past year? Yes   Have you fallen and had an injury in the past year? No   Fall screen comments testing deferred w/ recent surgery   Abuse Screen (yes response referral indicated)   Feels Unsafe at Home or Work/School no   Feels Threatened by Someone no   Does Anyone Try to Keep You From Having Contact with Others or Doing Things Outside Your Home? no   Knee Objective Findings   Side (if bilateral, select both right and left) Left    Observation Dressing covering incision, no drainage.   Light yellow/purple bruising post thigh   Integumentary  Girth suprapatellar  R 43.0 cm, L 48.0 cm.  swelling also noted at ankle   Gait/Locomotion Mild + limp w/ SEC   Left Knee Extension AROM R 0*,  L lacks 6*   Left Knee Flexion AROM 94* w/ heelslide   Left Knee Flexion PROM AA w/ sheet assist 97*   Left Quad Set Strength good +    L VMO Strength SLR good +   Planned Therapy Interventions   Planned Therapy Interventions manual therapy;neuromuscular re-education;ROM;strengthening;stretching   Clinical Impression   Criteria for Skilled Therapeutic Interventions Met yes, treatment indicated   PT Diagnosis s/p L TKA   Influenced by the following impairments pain, swelling, decreased motion, decreased strength   Functional limitations due to impairments walking, driving, stairs, bending, sleeping   Clinical Presentation Stable/Uncomplicated   Clinical Presentation Rationale progressing as expected    Clinical Decision Making (Complexity) Low complexity   Therapy Frequency 2 times/Week   Predicted Duration of Therapy Intervention (days/wks) 6 weeks then 1X/wk X 2 weeks = 14 visits   Risk & Benefits of therapy have been explained Yes   Patient, Family & other staff in agreement with plan of care Yes   Education Assessment   Preferred Learning Style Listening;Reading;Demonstration;Pictures/video   Barriers to Learning No barriers   ORTHO GOALS   PT Ortho Eval Goals 1;2;3;4;5   Ortho Goal 1   Goal Identifier 1.    Goal Description Pt will walk w/o device w/ slight to no limp   Target Date 10/01/21   Ortho Goal 2   Goal Identifier 2.   Goal Description Pt will be able to do stairs reciprocal w/ 1 rail and minimal difficulty   Target Date 10/31/21   Ortho Goal 3   Goal Identifier 3.   Goal Description Pt will have increased KF to 120* for improved bend w/ ADL's   Target Date 10/31/21   Ortho Goal 4   Goal Identifier 4.   Goal Description Pt will wake no > 4X/wk due to  knee and report increased ease getting back to sleep   Target Date 10/31/21   Ortho Goal 5   Goal Identifier 5.   Goal Description Pt will be independent and consistent w/HEP to manage symptoms   Target Date 10/31/21   Total Evaluation Time   PT Eval, Low Complexity Minutes (04380) 15   Therapy Certification   Certification date from 09/01/21   Certification date to 10/31/21   Medical Diagnosis s/p L TKA     Thank you for this referral,    Daksha Abbott, PT,   [id number]  [facility]  [phone]     2

## 2021-09-02 ENCOUNTER — TRANSCRIBE ORDERS (OUTPATIENT)
Dept: OTHER | Age: 71
End: 2021-09-02

## 2021-09-03 ENCOUNTER — HOSPITAL ENCOUNTER (OUTPATIENT)
Dept: PHYSICAL THERAPY | Facility: CLINIC | Age: 71
Setting detail: THERAPIES SERIES
End: 2021-09-03
Attending: ORTHOPAEDIC SURGERY
Payer: COMMERCIAL

## 2021-09-03 PROCEDURE — 97110 THERAPEUTIC EXERCISES: CPT | Mod: GP | Performed by: PHYSICAL THERAPIST

## 2021-09-05 ENCOUNTER — HEALTH MAINTENANCE LETTER (OUTPATIENT)
Age: 71
End: 2021-09-05

## 2021-09-08 ENCOUNTER — HOSPITAL ENCOUNTER (OUTPATIENT)
Dept: PHYSICAL THERAPY | Facility: CLINIC | Age: 71
Setting detail: THERAPIES SERIES
End: 2021-09-08
Attending: ORTHOPAEDIC SURGERY
Payer: COMMERCIAL

## 2021-09-08 PROCEDURE — 97116 GAIT TRAINING THERAPY: CPT | Mod: GP | Performed by: PHYSICAL THERAPIST

## 2021-09-08 PROCEDURE — 97110 THERAPEUTIC EXERCISES: CPT | Mod: GP | Performed by: PHYSICAL THERAPIST

## 2021-09-10 ENCOUNTER — HOSPITAL ENCOUNTER (OUTPATIENT)
Dept: PHYSICAL THERAPY | Facility: CLINIC | Age: 71
Setting detail: THERAPIES SERIES
End: 2021-09-10
Attending: ORTHOPAEDIC SURGERY
Payer: COMMERCIAL

## 2021-09-10 PROCEDURE — 97110 THERAPEUTIC EXERCISES: CPT | Mod: GP

## 2021-09-14 ENCOUNTER — HOSPITAL ENCOUNTER (OUTPATIENT)
Dept: PHYSICAL THERAPY | Facility: CLINIC | Age: 71
Setting detail: THERAPIES SERIES
End: 2021-09-14
Attending: ORTHOPAEDIC SURGERY
Payer: COMMERCIAL

## 2021-09-14 PROCEDURE — 97110 THERAPEUTIC EXERCISES: CPT | Mod: GP | Performed by: PHYSICAL THERAPIST

## 2021-09-17 ENCOUNTER — HOSPITAL ENCOUNTER (OUTPATIENT)
Dept: PHYSICAL THERAPY | Facility: CLINIC | Age: 71
Setting detail: THERAPIES SERIES
End: 2021-09-17
Attending: ORTHOPAEDIC SURGERY
Payer: COMMERCIAL

## 2021-09-17 PROCEDURE — 97110 THERAPEUTIC EXERCISES: CPT | Mod: GP | Performed by: PHYSICAL THERAPIST

## 2021-09-21 ENCOUNTER — HOSPITAL ENCOUNTER (OUTPATIENT)
Dept: PHYSICAL THERAPY | Facility: CLINIC | Age: 71
Setting detail: THERAPIES SERIES
End: 2021-09-21
Attending: ORTHOPAEDIC SURGERY
Payer: COMMERCIAL

## 2021-09-21 PROCEDURE — 97110 THERAPEUTIC EXERCISES: CPT | Mod: GP | Performed by: PHYSICAL THERAPIST

## 2021-09-24 ENCOUNTER — HOSPITAL ENCOUNTER (OUTPATIENT)
Dept: PHYSICAL THERAPY | Facility: CLINIC | Age: 71
Setting detail: THERAPIES SERIES
End: 2021-09-24
Attending: ORTHOPAEDIC SURGERY
Payer: COMMERCIAL

## 2021-09-24 PROCEDURE — 97110 THERAPEUTIC EXERCISES: CPT | Mod: GP | Performed by: PHYSICAL THERAPIST

## 2021-09-29 ENCOUNTER — HOSPITAL ENCOUNTER (OUTPATIENT)
Dept: PHYSICAL THERAPY | Facility: CLINIC | Age: 71
Setting detail: THERAPIES SERIES
End: 2021-09-29
Attending: ORTHOPAEDIC SURGERY
Payer: COMMERCIAL

## 2021-09-29 PROCEDURE — 97110 THERAPEUTIC EXERCISES: CPT | Mod: GP | Performed by: PHYSICAL THERAPIST

## 2021-09-29 NOTE — PROGRESS NOTES
"   OUTPATIENT PHYSICAL THERAPY PROGRESS NOTE    09/29/21 0900   Signing Clinician's Name / Credentials   Signing clinician's name / credentials Daksha Ramírezaugiejonh, PT [id number]   Session Number   Session Number 9   Authorization status cert required   Progress Note/Recertification   Progress Note Due Date 10/31/21   Progress Note Completed Date 09/29/21   Recertification Due Date 10/31/21   Adult Goals   PT Ortho Eval Goals 1;2;3;4;5   Ortho Goal 1   Goal Identifier 1.    Goal Description Pt will walk w/o device w/ slight to no limp   Goal Progress 9/29/21 slight limp   Target Date 10/01/21   Date Met 09/29/21   Ortho Goal 2   Goal Identifier 2.   Goal Description Pt will be able to do stairs reciprocal w/ 1 rail and minimal difficulty   Target Date 10/31/21   Date Met 09/29/21   Ortho Goal 3   Goal Identifier 3.   Goal Description Pt will have increased KF to 120* for improved bend w/ ADL's   Goal Progress 9/29/21 AROM 114*,  partially met   Target Date 10/31/21   Ortho Goal 4   Goal Identifier 4.   Goal Description Pt will wake no > 4X/wk due to knee and report increased ease getting back to sleep   Goal Progress 9/29/21 ongoing difficulty w/ sleep.    Target Date 10/31/21   Ortho Goal 5   Goal Identifier 5.   Goal Description Pt will be independent and consistent w/HEP to manage symptoms   Target Date 10/31/21   Subjective Report   Subjective Report Pt states the knee is improving.  Pt notes sleep varies even w/ new med but did sleep better last night.  Pt states back is feeling better.   Objective Measures    slight limp w/o device    L KF w/ heelslide AROM 114*,  * (with sheet)    L KE AROM lacks 2*,  AA 0*   Treatment Interventions   Interventions Therapeutic Procedure/Exercise   Treatment Detail  Scifit seat 11  L 3 X 3 min.  Reciprocal stairs w/ 1 rail x 4 steps.  KF on Step X 10.   fwd step up 6\" X 20 w/ B UE support.  Incline board gastroc stretch 30\".   SLS X 2 (improved).    Dock kicks.   Seated LAQ " 2# X 20.   Sit to stand from plinth lower thigh w/o UE assist X 15 (cuing for = wt bearing).   Heelslide w/ sheet X 10.   SLR x 20 (cuing to keep knee straight).  Hip abd in SL X 20.   KE overpressure w/ PT.  Backwards walking 20'    Plan   Home program ex as above   Plan  cont 1X/wk up to 4 visits for ROM/ strengthening.     Comments   Comments Progress towards goals as noted above

## 2021-10-05 ENCOUNTER — HOSPITAL ENCOUNTER (OUTPATIENT)
Dept: PHYSICAL THERAPY | Facility: CLINIC | Age: 71
Setting detail: THERAPIES SERIES
End: 2021-10-05
Attending: ORTHOPAEDIC SURGERY
Payer: COMMERCIAL

## 2021-10-05 PROCEDURE — 97110 THERAPEUTIC EXERCISES: CPT | Mod: GP | Performed by: PHYSICAL THERAPIST

## 2021-10-12 ENCOUNTER — HOSPITAL ENCOUNTER (OUTPATIENT)
Dept: PHYSICAL THERAPY | Facility: CLINIC | Age: 71
Setting detail: THERAPIES SERIES
End: 2021-10-12
Attending: ORTHOPAEDIC SURGERY
Payer: COMMERCIAL

## 2021-10-12 PROCEDURE — 97110 THERAPEUTIC EXERCISES: CPT | Mod: GP | Performed by: PHYSICAL THERAPIST

## 2021-11-16 NOTE — PROGRESS NOTES
"   OUTPATIENT PHYSICAL THERAPY DISCHARGE SUMMARY   Nico Acosta MD 9/1/21 to 10/12/21 0900   Signing Clinician's Name / Credentials   Signing clinician's name / credentials Daksha Ramírezdanielle, PT [id number]   Session Number   Session Number 11   Adult Goals   PT Ortho Eval Goals 1;2;3;4;5   Ortho Goal 1   Goal Identifier 1.    Goal Description Pt will walk w/o device w/ slight to no limp   Goal Progress 9/29/21 slight limp   Target Date 10/01/21   Date Met 09/29/21   Ortho Goal 2   Goal Identifier 2.   Goal Description Pt will be able to do stairs reciprocal w/ 1 rail and minimal difficulty   Target Date 10/31/21   Date Met 09/29/21   Ortho Goal 3   Goal Identifier 3.   Goal Description Pt will have increased KF to 120* for improved bend w/ ADL's   Goal Progress 9/29/21 AROM 114*,  partially met   Target Date 10/31/21   Ortho Goal 4   Goal Identifier 4.   Goal Description Pt will wake no > 4X/wk due to knee and report increased ease getting back to sleep   Goal Progress 9/29/21 ongoing difficulty w/ sleep.   10/12/21 waking 2-3X/night   Target Date 10/31/21   Ortho Goal 5   Goal Identifier 5.   Goal Description Pt will be independent and consistent w/HEP to manage symptoms   Target Date 10/31/21   Date Met 10/12/21   Subjective Report   Subjective Report Pt states he had a busy weekend at the [facility]--had to drive the [employer], walked around some.  Pt notes he had some difficulty climbing into [employer] but notes he slept well all 3 nights.     Objective Measures    No limp    L KF w/ heelslide AROM 113*,  * (with sheet)    L KE AROM 0*   Treatment Interventions   Therapeutic Procedure/exercise   Treatment Detail  Scifit seat 11  L 3 X 3 min.    KF on Step X 10.   fwd step up 7\" X 20 w/ B UE support.  Incline board gastroc stretch 30\".   SLS X 30\" ( 9 touches).     Dock kicks.   Seated LAQ 3# X 20.   Sit to stand from plinth lower thigh w/o UE assist X 15.   Heelslide w/ sheet X 10.   SLR x 20.  KE " overpressure w/ PT.  Backwards walking 20'  x 2.  sidestepping w/ slight bend  20' X 2.     Plan   Home program ex as above   Plan Pt plans to cont on own w/ HEP.  chart open X 30 days.     Comments   Comments Pt met goals 1,2 and 5, making progress towards other goals

## 2021-12-02 ENCOUNTER — HOSPITAL ENCOUNTER (OUTPATIENT)
Dept: MRI IMAGING | Facility: CLINIC | Age: 71
Discharge: HOME | End: 2021-12-02
Attending: STUDENT IN AN ORGANIZED HEALTH CARE EDUCATION/TRAINING PROGRAM | Admitting: STUDENT IN AN ORGANIZED HEALTH CARE EDUCATION/TRAINING PROGRAM
Payer: COMMERCIAL

## 2021-12-02 DIAGNOSIS — M54.50 LUMBAR PAIN: ICD-10-CM

## 2021-12-02 PROCEDURE — 72148 MRI LUMBAR SPINE W/O DYE: CPT

## 2022-04-26 ENCOUNTER — OFFICE VISIT (OUTPATIENT)
Dept: DERMATOLOGY | Facility: CLINIC | Age: 72
End: 2022-04-26
Payer: COMMERCIAL

## 2022-04-26 VITALS — DIASTOLIC BLOOD PRESSURE: 73 MMHG | HEART RATE: 87 BPM | SYSTOLIC BLOOD PRESSURE: 114 MMHG | OXYGEN SATURATION: 99 %

## 2022-04-26 DIAGNOSIS — Z85.828 HISTORY OF BASAL CELL CANCER: ICD-10-CM

## 2022-04-26 DIAGNOSIS — L30.0 NUMMULAR ECZEMA: Primary | ICD-10-CM

## 2022-04-26 DIAGNOSIS — L81.4 LENTIGO: ICD-10-CM

## 2022-04-26 DIAGNOSIS — D18.01 CHERRY ANGIOMA: ICD-10-CM

## 2022-04-26 DIAGNOSIS — Z86.006 HISTORY OF MELANOMA IN SITU: ICD-10-CM

## 2022-04-26 DIAGNOSIS — L57.0 ACTINIC KERATOSIS: ICD-10-CM

## 2022-04-26 DIAGNOSIS — D22.9 MULTIPLE BENIGN NEVI: ICD-10-CM

## 2022-04-26 DIAGNOSIS — L82.1 SEBORRHEIC KERATOSIS: ICD-10-CM

## 2022-04-26 PROCEDURE — 17003 DESTRUCT PREMALG LES 2-14: CPT | Performed by: PHYSICIAN ASSISTANT

## 2022-04-26 PROCEDURE — 99213 OFFICE O/P EST LOW 20 MIN: CPT | Mod: 25 | Performed by: PHYSICIAN ASSISTANT

## 2022-04-26 PROCEDURE — 17000 DESTRUCT PREMALG LESION: CPT | Performed by: PHYSICIAN ASSISTANT

## 2022-04-26 RX ORDER — FLUOCINONIDE 0.5 MG/G
CREAM TOPICAL
Qty: 60 G | Refills: 1 | Status: SHIPPED | OUTPATIENT
Start: 2022-04-26 | End: 2023-07-20

## 2022-04-26 NOTE — LETTER
4/26/2022         RE: Jay Carter  [address] [facility] [id number]        Dear Colleague,    Thank you for referring your patient, Jay Carter, to the [facility]. Please see a copy of my visit note below.    Jay Carter is an extremely pleasant 71 year old year old male patient here today for skin check. He denies any painful or bleeding skin lesions. Notes a few rough areas on top of scalp.  Patient has no other skin complaints today.  Remainder of the HPI, Meds, PMH, Allergies, FH, and SH was reviewed in chart.    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  Past Medical History:   Diagnosis Date     Basal cell carcinoma        Past Surgical History:   Procedure Laterality Date     ARTHROPLASTY KNEE Left 8/24/2021    Procedure: Left Total Knee Arthroplasty;  Surgeon: Nico Acosta MD;  Location: [facility]     ARTHROSCOPY SHOULDER      converted to open, complications     ARTHROSCOPY SHOULDER ROTATOR CUFF REPAIR Bilateral      BACK SURGERY       CERVICAL LAMINECTOMY      hemilaminectomy C6-7     OTHER SURGICAL HISTORY      excision pilonidal cyst     CT LAP,PROSTATECTOMY,RADICAL,W/NERVE SPARE,INCL ROBOTIC Bilateral 2/6/2019    Procedure: ROBOTIC RADICAL RETROPUBIC PROSTATECTOMY WITH BILATERAL PELVIC LYMPH NODE DISSECTION;  Surgeon: Sean Perez MD;  Location: [facility] [place], [place];  Service: Urology     RELEASE TRIGGER FINGER Bilateral      SPINAL CORD DECOMPRESSION      L5     WISDOM TOOTH EXTRACTION          Family History   Problem Relation Age of Onset     Heart Disease Mother      Myocardial Infarction Brother         before age 65     Bone Cancer Brother      Hypertension Brother      Rheumatoid Arthritis Brother        Social History     Socioeconomic History     Marital status:      Spouse name: Not on file     Number of children: Not on file     Years of education: Not on file     Highest  education level: Not on file   Occupational History     Not on file   Tobacco Use     Smoking status: Former Smoker     Packs/day: 0.00     Years: 3.00     Pack years: 0.00     Smokeless tobacco: Never Used   Substance and Sexual Activity     Alcohol use: Yes     Comment: occasional     Drug use: No     Sexual activity: Not on file   Other Topics Concern     Parent/sibling w/ CABG, MI or angioplasty before 65F 55M? Yes     Comment: brother  of heart disease   Social History Narrative     Not on file     Social Determinants of Health     Financial Resource Strain: Not on file   Food Insecurity: Not on file   Transportation Needs: Not on file   Physical Activity: Not on file   Stress: Not on file   Social Connections: Not on file   Intimate Partner Violence: Not on file   Housing Stability: Not on file       Outpatient Encounter Medications as of 2022   Medication Sig Dispense Refill     acetaminophen (TYLENOL) 325 MG tablet Take 2 tablets (650 mg) by mouth every 4 hours as needed for other (mild pain) 100 tablet 0     aspirin (ASA) 325 MG EC tablet Take 1 tablet (325 mg) by mouth daily 30 tablet 0     buPROPion (WELLBUTRIN SR) 200 MG 12 hr tablet Take 1 tablet by mouth 2 times daily AM and noon       cholecalciferol 50 MCG (2000 UT) CAPS Take 2,000 Units by mouth daily        enalapril (VASOTEC) 20 MG tablet Take 20 mg by mouth daily        fluocinonide (LIDEX) 0.05 % external cream Apply sparingly twice daily for 3-4 weeks. 60 g 1     hydrochlorothiazide (HYDRODIURIL) 25 MG tablet Take 25 mg by mouth daily        hydrOXYzine (ATARAX) 25 MG tablet Take 1-2 tablets (25-50 mg) by mouth every 6 hours as needed for itching or anxiety (with pain, moderate pain) 40 tablet 0     ibuprofen (ADVIL/MOTRIN) 600 MG tablet Take 1 tablet (600 mg) by mouth every 6 hours as needed for pain (mild) 30 tablet 0     Multiple Vitamins-Minerals (GILBERT MULTIVITAMIN FOR MEN) TABS Take 1 tablet by mouth daily        omeprazole  (PRILOSEC OTC) 20 MG EC tablet Take 20 mg by mouth twice a week On Sunday and Thursday       rosuvastatin (CRESTOR) 20 MG tablet Take 1 tablet by mouth daily        triamcinolone (NASACORT) 55 MCG/ACT nasal aerosol Spray 2 sprays in nostril every evening        [DISCONTINUED] oxyCODONE (ROXICODONE) 5 MG tablet Take 1-2 tablets (5-10 mg) by mouth every 3 hours as needed for pain (Moderate to Severe) (Patient not taking: Reported on 4/26/2022) 40 tablet 0     [DISCONTINUED] senna-docusate (SENOKOT-S/PERICOLACE) 8.6-50 MG tablet Take 1-2 tablets by mouth daily as needed for constipation Take while on oral narcotics to prevent or treat constipation. 15 tablet 0     No facility-administered encounter medications on file as of 4/26/2022.             O:   NAD, WDWN, Alert & Oriented, Mood & Affect wnl, Vitals stable   Here today alone   /73 (BP Location: Right arm, Patient Position: Sitting, Cuff Size: Adult Large)   Pulse 87   SpO2 99%    General appearance normal   Vitals stable   Alert, oriented and in no acute distress     Gritty papules on crown of scalp x 2  Stuck on papules and brown macules on trunk and ext   Red papules on trunk  Brown papules and macules with regular pigment network and borders on torso and extremities     The remainder of skin exam is normal       Eyes: Conjunctivae/lids:Normal     ENT: Lips: normal    MSK:Normal    Cardiovascular: peripheral edema none    Pulm: Breathing Normal    Lymph Nodes: No Head and Neck Lymphadenopathy     Neuro/Psych: Orientation:Alert and Orientedx3 ; Mood/Affect:normal   A/P:  1. Actinic keratoses on crown of scalp x 2  LN2:  Treated with LN2 for 5s for 1-2 cycles. Warned risks of blistering, pain, pigment change, scarring, and incomplete resolution.  Advised patient to return if lesions do not completely resolve.  Wound care sheet given.  2. Dermatitis on legs   Use moisturizers twice daily.   Apply lidex as needed.   Return if not resolving in one month.    3. 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 I explained the need for monthly skin exams including and taught the patient how to do this. Patient was asked about new or changing moles . I discussed with patient signs and symptoms that could arise in the setting of recurrent locoregional or metastatic disease. In addition, the need to undergo every 12 month dermatologic full skin survey and evaluation given that patients with a diagnosis of melanoma are at risk of recurrence (local and distant) and of subsequent de juwan melanoma.    4. Seborrheic keratosis, lentigo, angioma, benign nevi, history of BCC  BENIGN LESIONS DISCUSSED WITH PATIENT:  I discussed the specifics of tumor, prognosis, and genetics of benign lesions.  I explained that treatment of these lesions would be purely cosmetic and not medically neccessary.  I discussed with patient different removal options including excision, cautery and /or laser.      Nature and genetics of benign skin lesions dicussed with patient.  Signs and Symptoms of skin cancer discussed with patient.  ABCDEs of melanoma reviewed with patient.  Patient encouraged to perform monthly skin exams.  UV precautions reviewed with patient.  Risks of non-melanoma skin cancer discussed with patient   Return to clinic in one year or sooner if needed.       Again, thank you for allowing me to participate in the care of your patient.        Sincerely,        Verona Lynch PA-C     supportive care

## 2022-04-26 NOTE — NURSING NOTE
Chief Complaint   Patient presents with     Skin Check     Left posterior shoulder x 2 and right posterior shoulder, rash that comes as goes on legs        Vitals:    04/26/22 0921   BP: 114/73   BP Location: Right arm   Patient Position: Sitting   Cuff Size: Adult Large   Pulse: 87   SpO2: 99%     Wt Readings from Last 1 Encounters:   08/24/21 93 kg (205 lb)       Micheline Hood LPN .................4/26/2022

## 2022-05-02 NOTE — PROGRESS NOTES
Jay Carter is an extremely pleasant 71 year old year old male patient here today for skin check. He denies any painful or bleeding skin lesions. Notes a few rough areas on top of scalp.  Patient has no other skin complaints today.  Remainder of the HPI, Meds, PMH, Allergies, FH, and SH was reviewed in chart.    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  Past Medical History:   Diagnosis Date     Basal cell carcinoma        Past Surgical History:   Procedure Laterality Date     ARTHROPLASTY KNEE Left 8/24/2021    Procedure: Left Total Knee Arthroplasty;  Surgeon: Nico Acosta MD;  Location: [facility] [facility]     ARTHROSCOPY SHOULDER      converted to open, complications     ARTHROSCOPY SHOULDER ROTATOR CUFF REPAIR Bilateral      BACK SURGERY       CERVICAL LAMINECTOMY      hemilaminectomy C6-7     OTHER SURGICAL HISTORY      excision pilonidal cyst     IA LAP,PROSTATECTOMY,RADICAL,W/NERVE SPARE,INCL ROBOTIC Bilateral 2/6/2019    Procedure: ROBOTIC RADICAL RETROPUBIC PROSTATECTOMY WITH BILATERAL PELVIC LYMPH NODE DISSECTION;  Surgeon: Sean Perez MD;  Location: [facility] - [place], [place];  Service: Urology     RELEASE TRIGGER FINGER Bilateral      SPINAL CORD DECOMPRESSION      L5     WISDOM TOOTH EXTRACTION          Family History   Problem Relation Age of Onset     Heart Disease Mother      Myocardial Infarction Brother         before age 65     Bone Cancer Brother      Hypertension Brother      Rheumatoid Arthritis Brother        Social History     Socioeconomic History     Marital status:      Spouse name: Not on file     Number of children: Not on file     Years of education: Not on file     Highest education level: Not on file   Occupational History     Not on file   Tobacco Use     Smoking status: Former Smoker     Packs/day: 0.00     Years: 3.00     Pack years: 0.00     Smokeless tobacco: Never Used   Substance and Sexual Activity     Alcohol use: Yes      Comment: occasional     Drug use: No     Sexual activity: Not on file   Other Topics Concern     Parent/sibling w/ CABG, MI or angioplasty before 65F 55M? Yes     Comment: brother  of heart disease   Social History Narrative     Not on file     Social Determinants of Health     Financial Resource Strain: Not on file   Food Insecurity: Not on file   Transportation Needs: Not on file   Physical Activity: Not on file   Stress: Not on file   Social Connections: Not on file   Intimate Partner Violence: Not on file   Housing Stability: Not on file       Outpatient Encounter Medications as of 2022   Medication Sig Dispense Refill     acetaminophen (TYLENOL) 325 MG tablet Take 2 tablets (650 mg) by mouth every 4 hours as needed for other (mild pain) 100 tablet 0     aspirin (ASA) 325 MG EC tablet Take 1 tablet (325 mg) by mouth daily 30 tablet 0     buPROPion (WELLBUTRIN SR) 200 MG 12 hr tablet Take 1 tablet by mouth 2 times daily AM and noon       cholecalciferol 50 MCG (2000 UT) CAPS Take 2,000 Units by mouth daily        enalapril (VASOTEC) 20 MG tablet Take 20 mg by mouth daily        fluocinonide (LIDEX) 0.05 % external cream Apply sparingly twice daily for 3-4 weeks. 60 g 1     hydrochlorothiazide (HYDRODIURIL) 25 MG tablet Take 25 mg by mouth daily        hydrOXYzine (ATARAX) 25 MG tablet Take 1-2 tablets (25-50 mg) by mouth every 6 hours as needed for itching or anxiety (with pain, moderate pain) 40 tablet 0     ibuprofen (ADVIL/MOTRIN) 600 MG tablet Take 1 tablet (600 mg) by mouth every 6 hours as needed for pain (mild) 30 tablet 0     Multiple Vitamins-Minerals (GILBERT MULTIVITAMIN FOR MEN) TABS Take 1 tablet by mouth daily        omeprazole (PRILOSEC OTC) 20 MG EC tablet Take 20 mg by mouth twice a week On  and Thursday       rosuvastatin (CRESTOR) 20 MG tablet Take 1 tablet by mouth daily        triamcinolone (NASACORT) 55 MCG/ACT nasal aerosol Spray 2 sprays in nostril every evening         [DISCONTINUED] oxyCODONE (ROXICODONE) 5 MG tablet Take 1-2 tablets (5-10 mg) by mouth every 3 hours as needed for pain (Moderate to Severe) (Patient not taking: Reported on 4/26/2022) 40 tablet 0     [DISCONTINUED] senna-docusate (SENOKOT-S/PERICOLACE) 8.6-50 MG tablet Take 1-2 tablets by mouth daily as needed for constipation Take while on oral narcotics to prevent or treat constipation. 15 tablet 0     No facility-administered encounter medications on file as of 4/26/2022.             O:   NAD, WDWN, Alert & Oriented, Mood & Affect wnl, Vitals stable   Here today alone   /73 (BP Location: Right arm, Patient Position: Sitting, Cuff Size: Adult Large)   Pulse 87   SpO2 99%    General appearance normal   Vitals stable   Alert, oriented and in no acute distress     Gritty papules on crown of scalp x 2  Stuck on papules and brown macules on trunk and ext   Red papules on trunk  Brown papules and macules with regular pigment network and borders on torso and extremities     The remainder of skin exam is normal       Eyes: Conjunctivae/lids:Normal     ENT: Lips: normal    MSK:Normal    Cardiovascular: peripheral edema none    Pulm: Breathing Normal    Lymph Nodes: No Head and Neck Lymphadenopathy     Neuro/Psych: Orientation:Alert and Orientedx3 ; Mood/Affect:normal   A/P:  1. Actinic keratoses on crown of scalp x 2  LN2:  Treated with LN2 for 5s for 1-2 cycles. Warned risks of blistering, pain, pigment change, scarring, and incomplete resolution.  Advised patient to return if lesions do not completely resolve.  Wound care sheet given.  2. Dermatitis on legs   Use moisturizers twice daily.   Apply lidex as needed.   Return if not resolving in one month.   3. 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 I explained the need for monthly skin exams including and taught the  patient how to do this. Patient was asked about new or changing moles . I discussed with patient signs and symptoms that could arise in the setting of recurrent locoregional or metastatic disease. In addition, the need to undergo every 12 month dermatologic full skin survey and evaluation given that patients with a diagnosis of melanoma are at risk of recurrence (local and distant) and of subsequent de juwan melanoma.    4. Seborrheic keratosis, lentigo, angioma, benign nevi, history of BCC  BENIGN LESIONS DISCUSSED WITH PATIENT:  I discussed the specifics of tumor, prognosis, and genetics of benign lesions.  I explained that treatment of these lesions would be purely cosmetic and not medically neccessary.  I discussed with patient different removal options including excision, cautery and /or laser.      Nature and genetics of benign skin lesions dicussed with patient.  Signs and Symptoms of skin cancer discussed with patient.  ABCDEs of melanoma reviewed with patient.  Patient encouraged to perform monthly skin exams.  UV precautions reviewed with patient.  Risks of non-melanoma skin cancer discussed with patient   Return to clinic in one year or sooner if needed.

## 2022-08-01 ENCOUNTER — APPOINTMENT (OUTPATIENT)
Dept: ULTRASOUND IMAGING | Facility: CLINIC | Age: 72
End: 2022-08-01
Attending: EMERGENCY MEDICINE
Payer: COMMERCIAL

## 2022-08-01 ENCOUNTER — HOSPITAL ENCOUNTER (EMERGENCY)
Facility: CLINIC | Age: 72
Discharge: HOME | End: 2022-08-01
Attending: EMERGENCY MEDICINE | Admitting: EMERGENCY MEDICINE
Payer: COMMERCIAL

## 2022-08-01 VITALS
WEIGHT: 200 LBS | SYSTOLIC BLOOD PRESSURE: 169 MMHG | DIASTOLIC BLOOD PRESSURE: 74 MMHG | HEART RATE: 68 BPM | BODY MASS INDEX: 28.63 KG/M2 | RESPIRATION RATE: 16 BRPM | OXYGEN SATURATION: 97 % | TEMPERATURE: 97.7 F | HEIGHT: 70 IN

## 2022-08-01 DIAGNOSIS — M54.50 RIGHT-SIDED LOW BACK PAIN WITHOUT SCIATICA, UNSPECIFIED CHRONICITY: ICD-10-CM

## 2022-08-01 DIAGNOSIS — M79.661 PAIN OF RIGHT LOWER LEG: ICD-10-CM

## 2022-08-01 PROCEDURE — 93971 EXTREMITY STUDY: CPT | Mod: RT

## 2022-08-01 PROCEDURE — 99284 EMERGENCY DEPT VISIT MOD MDM: CPT | Performed by: EMERGENCY MEDICINE

## 2022-08-01 PROCEDURE — 99284 EMERGENCY DEPT VISIT MOD MDM: CPT | Mod: 25 | Performed by: EMERGENCY MEDICINE

## 2022-08-01 ASSESSMENT — ENCOUNTER SYMPTOMS
ALLERGIC/IMMUNOLOGIC NEGATIVE: 1
CARDIOVASCULAR NEGATIVE: 1
RESPIRATORY NEGATIVE: 1
BACK PAIN: 1
ENDOCRINE NEGATIVE: 1
NEUROLOGICAL NEGATIVE: 1
HEMATOLOGIC/LYMPHATIC NEGATIVE: 1
EYES NEGATIVE: 1
GASTROINTESTINAL NEGATIVE: 1
PSYCHIATRIC NEGATIVE: 1
CONSTITUTIONAL NEGATIVE: 1

## 2022-08-01 NOTE — ED TRIAGE NOTES
Right leg pain radiates from right thigh down to right foot-chronic problem-has received cortisone shots for pain. Loss of bladder control overnight.      Triage Assessment     Row Name 08/01/22 2158       Triage Assessment (Adult)    Airway WDL WDL       Respiratory WDL    Respiratory WDL WDL       Skin Circulation/Temperature WDL    Skin Circulation/Temperature WDL WDL       Cardiac WDL    Cardiac WDL WDL       Peripheral/Neurovascular WDL    Peripheral Neurovascular WDL WDL       Cognitive/Neuro/Behavioral WDL    Cognitive/Neuro/Behavioral WDL WDL

## 2022-08-01 NOTE — ED PROVIDER NOTES
History     Chief Complaint   Patient presents with     Leg Pain     Right leg pain radiates from right thigh down to right foot-chronic problem-has received cortisone shots for pain. Pt also concerned for blood clot in RLE     HPI  Jay Carter is a 72 year old male who presents evaluation with report of right leg pain rating down his right thigh to right foot.  Patient has multiple medical diagnoses including history of hypertension, hyperlipidemia, obstructive sleep apnea, depression.    Patient's prescribed occasions were reviewed including Wellbutrin, enalapril, hydrochlorothiazide multivitamins omeprazole, Crestor aspirin, hydroxyzine.    On examination patient reports that he was camping up in [place] and had an episode of urinary incontinence last night.  Patient tells me that he had received an epidural steroid injection last on the memorial day week which lasted about 5 days.  His last injection previous to that was in October 2021.  Patient tells me he has pain around the lumbar area rating down his right thigh and right leg.  His fiancée at the bedside felt that his right lower leg veins appear more prominent.  Patient reports no calf pain.  No increased swelling.  He also reports no new leg weakness or numbness.  Due to pain and episode of urinary incontinence without perianal anesthesia fever chills or trauma or fall he presents for further care    Allergies:  Allergies   Allergen Reactions     Ampicillin Rash     Vancomycin Tinnitus     Ciprofloxacin Other (See Comments)     Hearing/audio disturbances     Simvastatin Other (See Comments)     Nightmares       Adhesive Tape Rash       Problem List:    Patient Active Problem List    Diagnosis Date Noted     Status post total knee replacement 08/24/2021     Priority: Medium     Melanoma in situ of left upper extremity including shoulder (H) 11/14/2019     Priority: Medium     Formatting of this note might be different from the original.  11/2019        History of malignant neoplasm of prostate 05/30/2019     Priority: Medium     Benign essential hypertension 01/25/2017     Priority: Medium     Hypertension 08/24/2016     Priority: Medium     Overview:   Enalapril started 11/2013       Hyperlipidemia 08/24/2016     Priority: Medium     Overview:   Pretreatment [id number]/5/2013 Chol 203, TG 90, HDL 41, . Strong maternal FH of CAD.  Lipitor 10 mg started 3/2014.        Obstructive sleep apnea syndrome 08/24/2016     Priority: Medium     Overview:   Initial sleep study 7/2013.  His wife passed away (cancer) in Aug, 2013.  He did not start CPAP at that time.       Major depressive disorder, single episode, mild (H) 02/25/2016     Priority: Medium     Basal cell carcinoma of scalp and skin of neck 09/06/2012     Priority: Medium     Actinic keratosis 08/09/2012     Priority: Medium        Past Medical History:    Past Medical History:   Diagnosis Date     Basal cell carcinoma        Past Surgical History:    Past Surgical History:   Procedure Laterality Date     ARTHROPLASTY KNEE Left 8/24/2021    Procedure: Left Total Knee Arthroplasty;  Surgeon: Nico Acosta MD;  Location: [facility]     ARTHROSCOPY SHOULDER      converted to open, complications     ARTHROSCOPY SHOULDER ROTATOR CUFF REPAIR Bilateral      BACK SURGERY       CERVICAL LAMINECTOMY      hemilaminectomy C6-7     OTHER SURGICAL HISTORY      excision pilonidal cyst     CT LAP,PROSTATECTOMY,RADICAL,W/NERVE SPARE,INCL ROBOTIC Bilateral 2/6/2019    Procedure: ROBOTIC RADICAL RETROPUBIC PROSTATECTOMY WITH BILATERAL PELVIC LYMPH NODE DISSECTION;  Surgeon: Sean Perez MD;  [place] [facility] [place]     RELEASE TRIGGER FINGER Bilateral      SPINAL CORD DECOMPRESSION      L5     WISDOM TOOTH EXTRACTION         Family History:    Family History   Problem Relation Age of Onset     Heart Disease Mother      Myocardial Infarction Brother         before age 65     Bone Cancer  "Brother      Hypertension Brother      Rheumatoid Arthritis Brother        Social History:  Marital Status:   [5]  Social History     Tobacco Use     Smoking status: Former Smoker     Packs/day: 0.00     Years: 3.00     Pack years: 0.00     Smokeless tobacco: Never Used   Substance Use Topics     Alcohol use: Yes     Comment: occasional     Drug use: No        Medications:    acetaminophen (TYLENOL) 325 MG tablet  aspirin (ASA) 325 MG EC tablet  buPROPion (WELLBUTRIN SR) 200 MG 12 hr tablet  cholecalciferol 50 MCG (2000 UT) CAPS  enalapril (VASOTEC) 20 MG tablet  fluocinonide (LIDEX) 0.05 % external cream  hydrochlorothiazide (HYDRODIURIL) 25 MG tablet  hydrOXYzine (ATARAX) 25 MG tablet  ibuprofen (ADVIL/MOTRIN) 600 MG tablet  Multiple Vitamins-Minerals (GILBERT MULTIVITAMIN FOR MEN) TABS  omeprazole (PRILOSEC OTC) 20 MG EC tablet  rosuvastatin (CRESTOR) 20 MG tablet  triamcinolone (NASACORT) 55 MCG/ACT nasal aerosol          Review of Systems   Constitutional: Negative.    HENT: Negative.    Eyes: Negative.    Respiratory: Negative.    Cardiovascular: Negative.    Gastrointestinal: Negative.    Endocrine: Negative.    Genitourinary: Negative.    Musculoskeletal: Positive for back pain.        Right lower leg veins look more prominent.      Right lower leg pain about the thigh and lower leg   Skin: Negative.    Allergic/Immunologic: Negative.    Neurological: Negative.    Hematological: Negative.    Psychiatric/Behavioral: Negative.    All other systems reviewed and are negative.      Physical Exam   BP: (!) 169/74  Pulse: 68  Temp: 97.7  F (36.5  C)  Resp: 16  Height: 177.8 cm (5' 10\")  Weight: 90.7 kg (200 lb)  SpO2: 97 %      Physical Exam  Constitutional:       General: He is not in acute distress.     Appearance: Normal appearance. He is not ill-appearing, toxic-appearing or diaphoretic.   HENT:      Head: Normocephalic and atraumatic.      Right Ear: Tympanic membrane normal.      Left Ear: Tympanic " "membrane normal.      Nose: Nose normal.   Eyes:      Extraocular Movements: Extraocular movements intact.      Pupils: Pupils are equal, round, and reactive to light.   Cardiovascular:      Rate and Rhythm: Normal rate and regular rhythm.      Heart sounds: No murmur heard.    No friction rub. No gallop.   Pulmonary:      Effort: Pulmonary effort is normal. No respiratory distress.      Breath sounds: Normal breath sounds. No stridor. No wheezing, rhonchi or rales.   Chest:      Chest wall: No tenderness.   Musculoskeletal:         General: No swelling, tenderness, deformity or signs of injury.      Cervical back: Normal range of motion and neck supple.      Right lower leg: No edema.      Left lower leg: No edema.   Skin:     Capillary Refill: Capillary refill takes less than 2 seconds.      Coloration: Skin is not jaundiced or pale.      Findings: No bruising, erythema, lesion or rash.   Neurological:      General: No focal deficit present.      Mental Status: He is alert and oriented to person, place, and time.      Cranial Nerves: No cranial nerve deficit.      Sensory: No sensory deficit.      Motor: No weakness.      Coordination: Coordination normal.      Gait: Gait normal.      Deep Tendon Reflexes: Reflexes normal.   Psychiatric:         Mood and Affect: Mood normal.         Behavior: Behavior normal.         Thought Content: Thought content normal.         Judgment: Judgment normal.         ED Course                 Procedures              Critical Care time:  none               ED medications: none      ED Vitals:  Vitals:    08/01/22 1634   BP: (!) 169/74   Pulse: 68   Resp: 16   Temp: 97.7  F (36.5  C)   TempSrc: Tympanic   SpO2: 97%   Weight: 90.7 kg (200 lb)   Height: 1.778 m (5' 10\")     ED labs and imaging:  Results for orders placed or performed during the hospital encounter of 08/01/22   US Lower Extremity Venous Duplex Right     Status: None    Narrative    EXAM: US LOWER EXTREMITY VENOUS DUPLEX " RIGHT  LOCATION: [facility]  DATE/TIME: 8/1/2022 7:59 PM    INDICATION: Right thigh and right lower leg pain.  Family concerned about more prominent lower extremity veins.  Eval for DVT  COMPARISON: None.  TECHNIQUE: Venous Duplex ultrasound of the right lower extremity with and without compression, augmentation and duplex. Color flow and spectral Doppler with waveform analysis performed.    FINDINGS: Exam includes the common femoral, femoral, popliteal, and contralateral common femoral veins as well as segmentally visualized deep calf veins and greater saphenous vein.     RIGHT: No deep vein thrombosis. No superficial thrombophlebitis. No popliteal cyst.      Impression    IMPRESSION:  1.  No deep venous thrombosis in the right lower extremity.           Assessments & Plan (with Medical Decision Making)   Assessment Summary and Clinical Impression: 72-year-old male who presented with concern about a right lower extremity DVT.  He has a history of obstructive sleep apnea, hypertension, hyperlipidemia, depression, he reported chronic pain with discomfort radiating down right lower leg from the thigh to the lower leg.  His veins appeared more prominent per his fiancée was at the bedside.  Patient reports he had received an epidural steroid injection 3 months prior which provided some relief in his pain and discomfort for about 5 days.  His last epidural steroid injection was October 2021.  He was concerned that he had an episode of urinary incontinence last night but reports no perianal anesthesia and no new leg weakness or numbness.  Pain was around the low lumbar area.  Patient had equal and symmetric strength with no new or gross neurologic deficits.  With history of chronic low back pain now with episode urinary incontinence MRI not available due to staffing we reviewed options for treatment and imaging including transfer to higher level care where he could have MRI completed, CT  imaging as an alternative and then MRI in the morning if he was willing to board in the ED or be admitted for observation or return for imaging.  Patient elected to forego MRI imaging and follow-up as an outpatient with [facility].  Ultrasound revealed no DVT.  He was discharged symptoms of uncertain cause.    ED course and plan:  Reviewed the medical record.  MRI lumbar spine on 12/2/21-showing L5-S1 degenerative disc disease and facet disease with severe bilateral neuroforaminal stenosis and disease was also noted at L4-L5.  After reviewing options for imaging patient was offered a CT and ultrasound lower leg.  Patient elected to go home without completing CT and elected to have MRI completed as an outpatient.  He reported that he would follow-up with [facility] or completed ARI most recently in the last 3-month.  Ultrasound revealed no evidence for DVT.  Patient and partner wanted to go home and did not want CT imaging knowing that the MRI was not available due to staffing.  They elected to follow-up with their [facility] service for MRI imaging.    Reviewed outpatient follow-up and reasons to return to the department to be evaluated.  They expressed understanding.        Disclaimer: This note consists of symbols derived from keyboarding, dictation and/or voice recognition software. As a result, there may be errors in the script that have gone undetected. Please consider this when interpreting information found in this chart.  I have reviewed the nursing notes.    I have reviewed the findings, diagnosis, plan and need for follow up with the patient.       New Prescriptions    No medications on file       Final diagnoses:   Right-sided low back pain without sciatica, unspecified chronicity - acute on chronic   Pain of right lower leg       8/1/2022   [facility] EMERGENCY DEPT     Adi Smith MD  08/01/22 [id number]

## 2022-08-02 NOTE — DISCHARGE INSTRUCTIONS
1) You have elected to go home without completing CT imaging today because MRI unfortunately is not available due to staffing.  You have elected to call [facility] of completed prior epidural steroid injections for follow-up imaging with your last MRI from December 2021.    2) presented right lower leg did not show any evidence for a blood clot.  We discussed that follow-up imaging with MRI would be most beneficial for interval comparison.  MRI is available typically weekdays 8 AM to 9 PM and weekends 8 AM to 4 PM here at [facility].    3) If you are not able to secure an MRI appointments through [facility] you may return to the emergency department to have repeat imaging as reviewed and discussed

## 2022-08-22 ENCOUNTER — LAB (OUTPATIENT)
Dept: FAMILY MEDICINE | Facility: CLINIC | Age: 72
End: 2022-08-22
Payer: COMMERCIAL

## 2022-08-22 DIAGNOSIS — Z20.822 ENCOUNTER FOR LABORATORY TESTING FOR COVID-19 VIRUS: ICD-10-CM

## 2022-08-22 LAB — SARS-COV-2 RNA RESP QL NAA+PROBE: NEGATIVE

## 2022-08-22 PROCEDURE — 99207 PR NO CHARGE LOS: CPT

## 2022-08-22 PROCEDURE — U0003 INFECTIOUS AGENT DETECTION BY NUCLEIC ACID (DNA OR RNA); SEVERE ACUTE RESPIRATORY SYNDROME CORONAVIRUS 2 (SARS-COV-2) (CORONAVIRUS DISEASE [COVID-19]), AMPLIFIED PROBE TECHNIQUE, MAKING USE OF HIGH THROUGHPUT TECHNOLOGIES AS DESCRIBED BY CMS-2020-01-R: HCPCS

## 2022-08-22 PROCEDURE — U0005 INFEC AGEN DETEC AMPLI PROBE: HCPCS

## 2022-08-23 ENCOUNTER — ANESTHESIA EVENT (OUTPATIENT)
Dept: SURGERY | Facility: CLINIC | Age: 72
End: 2022-08-23
Payer: COMMERCIAL

## 2022-08-23 ASSESSMENT — LIFESTYLE VARIABLES: TOBACCO_USE: 1

## 2022-08-23 NOTE — ANESTHESIA PREPROCEDURE EVALUATION
Anesthesia Pre-Procedure Evaluation    Patient: Jay Carter   MRN: [id number] : 1950        Procedure : Procedure(s):  Lumbar 4-5 Lateral Discectomy,Right Lumbar 5-Sacral 1 Foraminotomy          Past Medical History:   Diagnosis Date     Basal cell carcinoma       Past Surgical History:   Procedure Laterality Date     ARTHROPLASTY KNEE Left 2021    Procedure: Left Total Knee Arthroplasty;  Surgeon: Nico Acosta MD;  Location: [place] [facility]     ARTHROSCOPY SHOULDER      converted to open, complications     ARTHROSCOPY SHOULDER ROTATOR CUFF REPAIR Bilateral      BACK SURGERY       CERVICAL LAMINECTOMY      hemilaminectomy C6-7     OTHER SURGICAL HISTORY      excision pilonidal cyst     IN LAP,PROSTATECTOMY,RADICAL,W/NERVE SPARE,INCL ROBOTIC Bilateral 2019    Procedure: ROBOTIC RADICAL RETROPUBIC PROSTATECTOMY WITH BILATERAL PELVIC LYMPH NODE DISSECTION;  Surgeon: Sean Perez MD;  Location: [facility] [place];  Service: Urology     RELEASE TRIGGER FINGER Bilateral      SPINAL CORD DECOMPRESSION      L5     WISDOM TOOTH EXTRACTION        Allergies   Allergen Reactions     Ampicillin Rash     Vancomycin Tinnitus     Ciprofloxacin Other (See Comments)     Hearing/audio disturbances     Simvastatin Other (See Comments)     Nightmares       Adhesive Tape Rash      Social History     Tobacco Use     Smoking status: Former Smoker     Packs/day: 0.00     Years: 3.00     Pack years: 0.00     Smokeless tobacco: Never Used   Substance Use Topics     Alcohol use: Yes     Comment: occasional      Wt Readings from Last 1 Encounters:   22 90.7 kg (200 lb)        Anesthesia Evaluation   Pt has had prior anesthetic. Type: General, MAC and Regional.        ROS/MED HX  ENT/Pulmonary:     (+) sleep apnea, tobacco use, Past use,     Neurologic:       Cardiovascular:     (+) Dyslipidemia hypertension-----    METS/Exercise Tolerance:     Hematologic:       Musculoskeletal: Comment: Spinal  stenosis  S/P cervical laminectomy C6-7  (+) arthritis,     GI/Hepatic:       Renal/Genitourinary:       Endo:       Psychiatric/Substance Use:     (+) psychiatric history depression     Infectious Disease:       Malignancy:   (+) Malignancy, History of Prostate and Skin.Prostate CA status post Surgery.  Skin CA Remission status post Surgery.        Other:            Physical Exam    Airway  airway exam normal      Mallampati: II   TM distance: > 3 FB   Neck ROM: full   Mouth opening: > 3 cm    Respiratory Devices and Support         Dental  no notable dental history         Cardiovascular   cardiovascular exam normal       Rhythm and rate: regular and normal     Pulmonary   pulmonary exam normal        breath sounds clear to auscultation           OUTSIDE LABS:  CBC:   Lab Results   Component Value Date    WBC 10.8 10/26/2019    HGB 12.2 (L) 08/25/2021    HGB 14.7 10/26/2019    HCT 43.8 10/26/2019     10/26/2019     02/06/2019     BMP:   Lab Results   Component Value Date     01/29/2018    POTASSIUM 3.9 01/29/2018    CHLORIDE 103 01/29/2018    CO2 29 01/29/2018    BUN 16 01/29/2018    CR 0.89 01/29/2018     (H) 08/25/2021    GLC 91 02/06/2019     COAGS: No results found for: PTT, INR, FIBR  POC: No results found for: BGM, HCG, HCGS  HEPATIC: No results found for: ALBUMIN, PROTTOTAL, ALT, AST, GGT, ALKPHOS, BILITOTAL, BILIDIRECT, TRACEY  OTHER:   Lab Results   Component Value Date    KOLTON 8.8 01/29/2018       Anesthesia Plan    ASA Status:  2   NPO Status:  NPO Appropriate    Anesthesia Type: General.     - Airway: ETT   Induction: Intravenous, Propofol.   Maintenance: Balanced.        Consents    Anesthesia Plan(s) and associated risks, benefits, and realistic alternatives discussed. Questions answered and patient/representative(s) expressed understanding.     - Discussed: Risks, Benefits and Alternatives for BOTH SEDATION and the PROCEDURE were discussed     - Discussed with:  Patient          Postoperative Care    Pain management: IV analgesics, Oral pain medications, Multi-modal analgesia.   PONV prophylaxis: Ondansetron (or other 5HT-3), Dexamethasone or Solumedrol     Comments:    Other Comments: 8/9/22 H&P       H&P reviewed: Unable to attach H&P to encounter due to EHR limitations. H&P Update: appropriate H&P reviewed, patient examined. No interval changes since H&P (within 30 days).         Austin Joyner CRNA, APRN CRNA

## 2022-08-24 ENCOUNTER — APPOINTMENT (OUTPATIENT)
Dept: GENERAL RADIOLOGY | Facility: CLINIC | Age: 72
End: 2022-08-24
Attending: ORTHOPAEDIC SURGERY
Payer: COMMERCIAL

## 2022-08-24 ENCOUNTER — HOSPITAL ENCOUNTER (OUTPATIENT)
Facility: CLINIC | Age: 72
Discharge: HOME | End: 2022-08-24
Attending: ORTHOPAEDIC SURGERY | Admitting: ORTHOPAEDIC SURGERY
Payer: COMMERCIAL

## 2022-08-24 ENCOUNTER — ANESTHESIA (OUTPATIENT)
Dept: SURGERY | Facility: CLINIC | Age: 72
End: 2022-08-24
Payer: COMMERCIAL

## 2022-08-24 VITALS
WEIGHT: 203 LBS | TEMPERATURE: 97.7 F | OXYGEN SATURATION: 96 % | HEART RATE: 73 BPM | HEIGHT: 71 IN | SYSTOLIC BLOOD PRESSURE: 133 MMHG | DIASTOLIC BLOOD PRESSURE: 72 MMHG | RESPIRATION RATE: 16 BRPM | BODY MASS INDEX: 28.42 KG/M2

## 2022-08-24 DIAGNOSIS — Z98.890 S/P LUMBAR DISCECTOMY: Primary | ICD-10-CM

## 2022-08-24 PROCEDURE — 250N000011 HC RX IP 250 OP 636: Performed by: ORTHOPAEDIC SURGERY

## 2022-08-24 PROCEDURE — 250N000025 HC SEVOFLURANE, PER MIN: Performed by: ORTHOPAEDIC SURGERY

## 2022-08-24 PROCEDURE — 999N000179 XR SURGERY CARM FLUORO LESS THAN 5 MIN W STILLS: Mod: TC

## 2022-08-24 PROCEDURE — 370N000017 HC ANESTHESIA TECHNICAL FEE, PER MIN: Performed by: ORTHOPAEDIC SURGERY

## 2022-08-24 PROCEDURE — 250N000011 HC RX IP 250 OP 636

## 2022-08-24 PROCEDURE — 272N000001 HC OR GENERAL SUPPLY STERILE: Performed by: ORTHOPAEDIC SURGERY

## 2022-08-24 PROCEDURE — 250N000009 HC RX 250: Performed by: NURSE ANESTHETIST, CERTIFIED REGISTERED

## 2022-08-24 PROCEDURE — 360N000084 HC SURGERY LEVEL 4 W/ FLUORO, PER MIN: Performed by: ORTHOPAEDIC SURGERY

## 2022-08-24 PROCEDURE — 250N000013 HC RX MED GY IP 250 OP 250 PS 637: Performed by: ORTHOPAEDIC SURGERY

## 2022-08-24 PROCEDURE — 710N000012 HC RECOVERY PHASE 2, PER MINUTE: Performed by: ORTHOPAEDIC SURGERY

## 2022-08-24 PROCEDURE — 271N000001 HC OR GENERAL SUPPLY NON-STERILE: Performed by: ORTHOPAEDIC SURGERY

## 2022-08-24 PROCEDURE — 258N000003 HC RX IP 258 OP 636: Performed by: NURSE ANESTHETIST, CERTIFIED REGISTERED

## 2022-08-24 PROCEDURE — 250N000011 HC RX IP 250 OP 636: Performed by: NURSE ANESTHETIST, CERTIFIED REGISTERED

## 2022-08-24 PROCEDURE — 250N000013 HC RX MED GY IP 250 OP 250 PS 637: Performed by: NURSE ANESTHETIST, CERTIFIED REGISTERED

## 2022-08-24 PROCEDURE — 710N000009 HC RECOVERY PHASE 1, LEVEL 1, PER MIN: Performed by: ORTHOPAEDIC SURGERY

## 2022-08-24 PROCEDURE — 999N000141 HC STATISTIC PRE-PROCEDURE NURSING ASSESSMENT: Performed by: ORTHOPAEDIC SURGERY

## 2022-08-24 RX ORDER — ACETAMINOPHEN 325 MG/1
975 TABLET ORAL ONCE
Status: DISCONTINUED | OUTPATIENT
Start: 2022-08-24 | End: 2022-08-24 | Stop reason: CLARIF

## 2022-08-24 RX ORDER — OXYCODONE HYDROCHLORIDE 5 MG/1
5 TABLET ORAL EVERY 4 HOURS PRN
Status: DISCONTINUED | OUTPATIENT
Start: 2022-08-24 | End: 2022-08-24 | Stop reason: HOSPADM

## 2022-08-24 RX ORDER — LIDOCAINE 40 MG/G
CREAM TOPICAL
Status: DISCONTINUED | OUTPATIENT
Start: 2022-08-24 | End: 2022-08-24 | Stop reason: HOSPADM

## 2022-08-24 RX ORDER — ONDANSETRON 4 MG/1
4 TABLET, ORALLY DISINTEGRATING ORAL EVERY 6 HOURS PRN
Status: DISCONTINUED | OUTPATIENT
Start: 2022-08-24 | End: 2022-08-24 | Stop reason: HOSPADM

## 2022-08-24 RX ORDER — GABAPENTIN 100 MG/1
100 CAPSULE ORAL
Status: COMPLETED | OUTPATIENT
Start: 2022-08-24 | End: 2022-08-24

## 2022-08-24 RX ORDER — HYDROMORPHONE HCL IN WATER/PF 6 MG/30 ML
0.4 PATIENT CONTROLLED ANALGESIA SYRINGE INTRAVENOUS
Status: DISCONTINUED | OUTPATIENT
Start: 2022-08-24 | End: 2022-08-24 | Stop reason: HOSPADM

## 2022-08-24 RX ORDER — CEFAZOLIN SODIUM/WATER 2 G/20 ML
2 SYRINGE (ML) INTRAVENOUS SEE ADMIN INSTRUCTIONS
Status: DISCONTINUED | OUTPATIENT
Start: 2022-08-24 | End: 2022-08-24 | Stop reason: HOSPADM

## 2022-08-24 RX ORDER — PROPOFOL 10 MG/ML
INJECTION, EMULSION INTRAVENOUS PRN
Status: DISCONTINUED | OUTPATIENT
Start: 2022-08-24 | End: 2022-08-24

## 2022-08-24 RX ORDER — ONDANSETRON 2 MG/ML
4 INJECTION INTRAMUSCULAR; INTRAVENOUS EVERY 6 HOURS PRN
Status: DISCONTINUED | OUTPATIENT
Start: 2022-08-24 | End: 2022-08-24 | Stop reason: HOSPADM

## 2022-08-24 RX ORDER — ONDANSETRON 2 MG/ML
4 INJECTION INTRAMUSCULAR; INTRAVENOUS EVERY 30 MIN PRN
Status: DISCONTINUED | OUTPATIENT
Start: 2022-08-24 | End: 2022-08-24 | Stop reason: HOSPADM

## 2022-08-24 RX ORDER — NALOXONE HYDROCHLORIDE 0.4 MG/ML
0.2 INJECTION, SOLUTION INTRAMUSCULAR; INTRAVENOUS; SUBCUTANEOUS
Status: DISCONTINUED | OUTPATIENT
Start: 2022-08-24 | End: 2022-08-24 | Stop reason: HOSPADM

## 2022-08-24 RX ORDER — EPHEDRINE SULFATE 50 MG/ML
INJECTION, SOLUTION INTRAVENOUS PRN
Status: DISCONTINUED | OUTPATIENT
Start: 2022-08-24 | End: 2022-08-24

## 2022-08-24 RX ORDER — PHENYLEPHRINE HYDROCHLORIDE 10 MG/ML
INJECTION INTRAVENOUS PRN
Status: DISCONTINUED | OUTPATIENT
Start: 2022-08-24 | End: 2022-08-24

## 2022-08-24 RX ORDER — NALOXONE HYDROCHLORIDE 0.4 MG/ML
0.4 INJECTION, SOLUTION INTRAMUSCULAR; INTRAVENOUS; SUBCUTANEOUS
Status: DISCONTINUED | OUTPATIENT
Start: 2022-08-24 | End: 2022-08-24 | Stop reason: HOSPADM

## 2022-08-24 RX ORDER — SODIUM CHLORIDE, SODIUM LACTATE, POTASSIUM CHLORIDE, CALCIUM CHLORIDE 600; 310; 30; 20 MG/100ML; MG/100ML; MG/100ML; MG/100ML
INJECTION, SOLUTION INTRAVENOUS CONTINUOUS
Status: DISCONTINUED | OUTPATIENT
Start: 2022-08-24 | End: 2022-08-24 | Stop reason: HOSPADM

## 2022-08-24 RX ORDER — BUPIVACAINE HYDROCHLORIDE 2.5 MG/ML
INJECTION, SOLUTION INFILTRATION; PERINEURAL PRN
Status: DISCONTINUED | OUTPATIENT
Start: 2022-08-24 | End: 2022-08-24 | Stop reason: HOSPADM

## 2022-08-24 RX ORDER — ACETAMINOPHEN 325 MG/1
975 TABLET ORAL ONCE
Status: COMPLETED | OUTPATIENT
Start: 2022-08-24 | End: 2022-08-24

## 2022-08-24 RX ORDER — LIDOCAINE HYDROCHLORIDE 10 MG/ML
INJECTION, SOLUTION INFILTRATION; PERINEURAL PRN
Status: DISCONTINUED | OUTPATIENT
Start: 2022-08-24 | End: 2022-08-24

## 2022-08-24 RX ORDER — FENTANYL CITRATE 50 UG/ML
INJECTION, SOLUTION INTRAMUSCULAR; INTRAVENOUS PRN
Status: DISCONTINUED | OUTPATIENT
Start: 2022-08-24 | End: 2022-08-24

## 2022-08-24 RX ORDER — HYDROXYZINE HYDROCHLORIDE 25 MG/1
25 TABLET, FILM COATED ORAL EVERY 6 HOURS PRN
Qty: 30 TABLET | Refills: 0 | Status: SHIPPED | OUTPATIENT
Start: 2022-08-24

## 2022-08-24 RX ORDER — HYDROMORPHONE HCL IN WATER/PF 6 MG/30 ML
0.2 PATIENT CONTROLLED ANALGESIA SYRINGE INTRAVENOUS EVERY 5 MIN PRN
Status: DISCONTINUED | OUTPATIENT
Start: 2022-08-24 | End: 2022-08-24 | Stop reason: HOSPADM

## 2022-08-24 RX ORDER — FENTANYL CITRATE 50 UG/ML
25 INJECTION, SOLUTION INTRAMUSCULAR; INTRAVENOUS
Status: DISCONTINUED | OUTPATIENT
Start: 2022-08-24 | End: 2022-08-24 | Stop reason: HOSPADM

## 2022-08-24 RX ORDER — MEPERIDINE HYDROCHLORIDE 25 MG/ML
12.5 INJECTION INTRAMUSCULAR; INTRAVENOUS; SUBCUTANEOUS
Status: DISCONTINUED | OUTPATIENT
Start: 2022-08-24 | End: 2022-08-24 | Stop reason: HOSPADM

## 2022-08-24 RX ORDER — HYDROCODONE BITARTRATE AND ACETAMINOPHEN 5; 325 MG/1; MG/1
1-2 TABLET ORAL EVERY 4 HOURS PRN
Qty: 30 TABLET | Refills: 0 | Status: SHIPPED | OUTPATIENT
Start: 2022-08-24

## 2022-08-24 RX ORDER — DEXAMETHASONE SODIUM PHOSPHATE 4 MG/ML
INJECTION, SOLUTION INTRA-ARTICULAR; INTRALESIONAL; INTRAMUSCULAR; INTRAVENOUS; SOFT TISSUE PRN
Status: DISCONTINUED | OUTPATIENT
Start: 2022-08-24 | End: 2022-08-24

## 2022-08-24 RX ORDER — CEFAZOLIN SODIUM 1 G/3ML
1 INJECTION, POWDER, FOR SOLUTION INTRAMUSCULAR; INTRAVENOUS ONCE
Status: CANCELLED | OUTPATIENT
Start: 2022-08-24

## 2022-08-24 RX ORDER — ACETAMINOPHEN 325 MG/1
650 TABLET ORAL EVERY 4 HOURS PRN
Status: DISCONTINUED | OUTPATIENT
Start: 2022-08-27 | End: 2022-08-24 | Stop reason: HOSPADM

## 2022-08-24 RX ORDER — CALCIUM CARBONATE 500 MG/1
500 TABLET, CHEWABLE ORAL 4 TIMES DAILY PRN
Status: DISCONTINUED | OUTPATIENT
Start: 2022-08-24 | End: 2022-08-24 | Stop reason: HOSPADM

## 2022-08-24 RX ORDER — FENTANYL CITRATE 50 UG/ML
25 INJECTION, SOLUTION INTRAMUSCULAR; INTRAVENOUS EVERY 5 MIN PRN
Status: DISCONTINUED | OUTPATIENT
Start: 2022-08-24 | End: 2022-08-24 | Stop reason: HOSPADM

## 2022-08-24 RX ORDER — PROCHLORPERAZINE MALEATE 5 MG
5 TABLET ORAL EVERY 6 HOURS PRN
Status: DISCONTINUED | OUTPATIENT
Start: 2022-08-24 | End: 2022-08-24 | Stop reason: HOSPADM

## 2022-08-24 RX ORDER — HYDROMORPHONE HCL IN WATER/PF 6 MG/30 ML
0.2 PATIENT CONTROLLED ANALGESIA SYRINGE INTRAVENOUS
Status: DISCONTINUED | OUTPATIENT
Start: 2022-08-24 | End: 2022-08-24 | Stop reason: HOSPADM

## 2022-08-24 RX ORDER — HYDROXYZINE HYDROCHLORIDE 25 MG/1
25 TABLET, FILM COATED ORAL EVERY 6 HOURS PRN
Status: DISCONTINUED | OUTPATIENT
Start: 2022-08-24 | End: 2022-08-24 | Stop reason: HOSPADM

## 2022-08-24 RX ORDER — OXYCODONE HYDROCHLORIDE 5 MG/1
10 TABLET ORAL EVERY 4 HOURS PRN
Status: DISCONTINUED | OUTPATIENT
Start: 2022-08-24 | End: 2022-08-24 | Stop reason: HOSPADM

## 2022-08-24 RX ORDER — ONDANSETRON 4 MG/1
4 TABLET, ORALLY DISINTEGRATING ORAL EVERY 30 MIN PRN
Status: DISCONTINUED | OUTPATIENT
Start: 2022-08-24 | End: 2022-08-24 | Stop reason: HOSPADM

## 2022-08-24 RX ORDER — CEPHALEXIN 500 MG/1
1000 CAPSULE ORAL 3 TIMES DAILY
Qty: 6 CAPSULE | Refills: 0 | Status: SHIPPED | OUTPATIENT
Start: 2022-08-24 | End: 2022-08-25

## 2022-08-24 RX ORDER — ONDANSETRON 2 MG/ML
INJECTION INTRAMUSCULAR; INTRAVENOUS PRN
Status: DISCONTINUED | OUTPATIENT
Start: 2022-08-24 | End: 2022-08-24

## 2022-08-24 RX ORDER — CEFAZOLIN SODIUM/WATER 2 G/20 ML
2 SYRINGE (ML) INTRAVENOUS
Status: COMPLETED | OUTPATIENT
Start: 2022-08-24 | End: 2022-08-24

## 2022-08-24 RX ORDER — CELECOXIB 200 MG/1
200 CAPSULE ORAL ONCE
Status: COMPLETED | OUTPATIENT
Start: 2022-08-24 | End: 2022-08-24

## 2022-08-24 RX ORDER — ACETAMINOPHEN 325 MG/1
975 TABLET ORAL EVERY 8 HOURS
Status: DISCONTINUED | OUTPATIENT
Start: 2022-08-24 | End: 2022-08-24 | Stop reason: HOSPADM

## 2022-08-24 RX ADMIN — SUGAMMADEX 200 MG: 100 INJECTION, SOLUTION INTRAVENOUS at 09:50

## 2022-08-24 RX ADMIN — PHENYLEPHRINE HYDROCHLORIDE 100 MCG: 10 INJECTION INTRAVENOUS at 08:49

## 2022-08-24 RX ADMIN — EPHEDRINE SULFATE 5 MG: 50 INJECTION, SOLUTION INTRAVENOUS at 08:02

## 2022-08-24 RX ADMIN — GABAPENTIN 100 MG: 100 CAPSULE ORAL at 06:48

## 2022-08-24 RX ADMIN — MIDAZOLAM 2 MG: 1 INJECTION INTRAMUSCULAR; INTRAVENOUS at 07:30

## 2022-08-24 RX ADMIN — SODIUM CHLORIDE, POTASSIUM CHLORIDE, SODIUM LACTATE AND CALCIUM CHLORIDE: 600; 310; 30; 20 INJECTION, SOLUTION INTRAVENOUS at 09:26

## 2022-08-24 RX ADMIN — OXYCODONE HYDROCHLORIDE 5 MG: 5 TABLET ORAL at 10:46

## 2022-08-24 RX ADMIN — EPHEDRINE SULFATE 5 MG: 50 INJECTION, SOLUTION INTRAVENOUS at 08:31

## 2022-08-24 RX ADMIN — EPHEDRINE SULFATE 5 MG: 50 INJECTION, SOLUTION INTRAVENOUS at 08:19

## 2022-08-24 RX ADMIN — PROPOFOL 200 MG: 10 INJECTION, EMULSION INTRAVENOUS at 07:39

## 2022-08-24 RX ADMIN — PHENYLEPHRINE HYDROCHLORIDE 150 MCG: 10 INJECTION INTRAVENOUS at 09:14

## 2022-08-24 RX ADMIN — FENTANYL CITRATE 100 MCG: 50 INJECTION, SOLUTION INTRAMUSCULAR; INTRAVENOUS at 07:39

## 2022-08-24 RX ADMIN — ROCURONIUM BROMIDE 50 MG: 50 INJECTION, SOLUTION INTRAVENOUS at 07:40

## 2022-08-24 RX ADMIN — ONDANSETRON 4 MG: 2 INJECTION INTRAMUSCULAR; INTRAVENOUS at 09:32

## 2022-08-24 RX ADMIN — PHENYLEPHRINE HYDROCHLORIDE 150 MCG: 10 INJECTION INTRAVENOUS at 09:07

## 2022-08-24 RX ADMIN — PHENYLEPHRINE HYDROCHLORIDE 100 MCG: 10 INJECTION INTRAVENOUS at 08:40

## 2022-08-24 RX ADMIN — EPHEDRINE SULFATE 5 MG: 50 INJECTION, SOLUTION INTRAVENOUS at 09:26

## 2022-08-24 RX ADMIN — SODIUM CHLORIDE, POTASSIUM CHLORIDE, SODIUM LACTATE AND CALCIUM CHLORIDE: 600; 310; 30; 20 INJECTION, SOLUTION INTRAVENOUS at 06:47

## 2022-08-24 RX ADMIN — HYDROMORPHONE HYDROCHLORIDE 1 MG: 1 INJECTION, SOLUTION INTRAMUSCULAR; INTRAVENOUS; SUBCUTANEOUS at 08:58

## 2022-08-24 RX ADMIN — LIDOCAINE HYDROCHLORIDE 0.1 ML: 10 INJECTION, SOLUTION EPIDURAL; INFILTRATION; INTRACAUDAL; PERINEURAL at 06:48

## 2022-08-24 RX ADMIN — Medication 2 G: at 07:26

## 2022-08-24 RX ADMIN — PHENYLEPHRINE HYDROCHLORIDE 100 MCG: 10 INJECTION INTRAVENOUS at 08:31

## 2022-08-24 RX ADMIN — EPHEDRINE SULFATE 5 MG: 50 INJECTION, SOLUTION INTRAVENOUS at 08:47

## 2022-08-24 RX ADMIN — CELECOXIB 200 MG: 200 CAPSULE ORAL at 06:48

## 2022-08-24 RX ADMIN — ROCURONIUM BROMIDE 20 MG: 50 INJECTION, SOLUTION INTRAVENOUS at 07:59

## 2022-08-24 RX ADMIN — LIDOCAINE HYDROCHLORIDE 50 MG: 10 INJECTION, SOLUTION INFILTRATION; PERINEURAL at 07:39

## 2022-08-24 RX ADMIN — PHENYLEPHRINE HYDROCHLORIDE 100 MCG: 10 INJECTION INTRAVENOUS at 07:52

## 2022-08-24 RX ADMIN — EPHEDRINE SULFATE 10 MG: 50 INJECTION, SOLUTION INTRAVENOUS at 08:04

## 2022-08-24 RX ADMIN — ACETAMINOPHEN 975 MG: 325 TABLET, COATED ORAL at 06:48

## 2022-08-24 RX ADMIN — HYDROXYZINE HYDROCHLORIDE 25 MG: 25 TABLET, FILM COATED ORAL at 11:20

## 2022-08-24 RX ADMIN — DEXAMETHASONE SODIUM PHOSPHATE 4 MG: 4 INJECTION, SOLUTION INTRA-ARTICULAR; INTRALESIONAL; INTRAMUSCULAR; INTRAVENOUS; SOFT TISSUE at 08:11

## 2022-08-24 RX ADMIN — EPHEDRINE SULFATE 5 MG: 50 INJECTION, SOLUTION INTRAVENOUS at 09:32

## 2022-08-24 ASSESSMENT — ACTIVITIES OF DAILY LIVING (ADL)
ADLS_ACUITY_SCORE: 35

## 2022-08-24 NOTE — OR NURSING
Pt and arleth gonzalez in [place]. Vss. Pt states today no pain and no right leg pain or numbness or tingling. preop pain meds given.

## 2022-08-24 NOTE — ANESTHESIA CARE TRANSFER NOTE
Patient: Jay Carter    Procedure: Procedure(s):  Lumbar 4-5 Lateral Discectomy,Right Lumbar 5-Sacral 1 Foraminotomy-right       Diagnosis: Spinal stenosis, unspecified spinal region [M48.00]  Lumbar disc herniation [M51.26]  Diagnosis Additional Information: No value filed.    Anesthesia Type:   General     Note:    Oropharynx: oropharynx clear of all foreign objects and spontaneously breathing  Level of Consciousness: drowsy  Oxygen Supplementation: face mask  Level of Supplemental Oxygen (L/min / FiO2): 6  Independent Airway: airway patency satisfactory and stable  Dentition: dentition unchanged  Vital Signs Stable: post-procedure vital signs reviewed and stable  Report to RN Given: handoff report given  Patient transferred to: PACU    Handoff Report: Identifed the Patient, Identified the Reponsible Provider, Reviewed the pertinent medical history, Discussed the surgical course, Reviewed Intra-OP anesthesia mangement and issues during anesthesia, Set expectations for post-procedure period and Allowed opportunity for questions and acknowledgement of understanding      Vitals:  Vitals Value Taken Time   /72 08/24/22 1005   Temp     Pulse 67 08/24/22 1007   Resp 6 08/24/22 1007   SpO2 98 % 08/24/22 1007   Vitals shown include unvalidated device data.    Electronically Signed By: Nanda Culp RN  August 24, 2022  10:08 AM

## 2022-08-24 NOTE — OP NOTE
Orthopedic  Operative Note    Pre-operative diagnosis: Spinal stenosis, unspecified spinal region [M48.00]  Lumbar disc herniation [M51.26]    Post-operative diagnosis: 1) Right L4-5 foraminal disc herniation   2) Right L5-S1 foraminal stenosis    Procedure: 1) Right L4-5 lateral discectomy (CPT 27637)   2) Right L5-S1 foraminotomy (CPT 56237)   3) Use of intraoperative microscope    Surgeon: Holland Ritchie MD    Assistant(s): Eryn Cerna is an experienced first surgical assistant whose assistance was necessary for patient positioning, hemostasis, soft tissue and neural retraction, closure, and safe progression of surgery.    Anesthesia: General endotracheal anesthesia and Local anesthesia    Estimated blood loss: 40mL     Drains: None    Specimens: None    Indications:                               Marshall has multiple month history of intermittent radiating right leg pain that seems to be polydermatomal in nature. He has presented to the ED at [facility] for this pain. He has been seen by non-operative spine provider who treated him with medications, non-surgical modalities such as ARI on at least two occasions. He had MRI showing foraminal disc protrusion at L4-5 on the right and right side foraminal stenosis at L5-S1. After failing to respond to exhaustive conservative treatments the patient elected to proceed with surgical intervention.    I discussed the risks of surgery with the the patient. There is the risk of general anesthesia, which is a risk for heart attach, stroke, respiratory compromise, death. There is a risk of all surgeries being bleeding or infection. In the case of spine surgery either of these could necessitate return to the OR in worst case scenario. There is a risk of recurrent disc herniation or failure to completely alleviate current symptoms. Also a risk of nerve root injury, temporary or permanent, which could cause pain, paresthesias/dysesthesias or weakness. A risk  for dural tear with persistent CSF leak. Having had the discussion above, the patient appears to understand the risks, intended outcomes, postoperative course and agrees to proceed with surgery.     Findings: Chronic right L4-5 foraminal disc protrusion, chronic right L5-S1 foraminal stenosis from facet arthropathy primarily    Complications: None     Procedure Detail: The patient was seen in the pre-operative holding area. They were given a chance to ask any further questions based on the conversation above and informed consent was obtained. The right low back was marked with indelible ink. The patient was brought back to the operative suite. After successful administration of general anesthesia and intubation the patient was then turned prone on the Bhaskar frame. All bony prominences were well-padded. The low back was then prepped and draped in the normal sterile fashion.    Prior to incision a critical pause was taken to ensure the correct patient, correct level, correct site/side, that current imaging studies were available, that all necessary instruments were available and that the patient had received appropriate pre-operative antibiotics. All in the room were in agreement.    A spinal needle and its trochar were placed through the skin paramedian at the anticipated levels. A cross-table lateral x-ray was taken to ensure appropriate level for incision. Skin incised with 10 blade in the midline. This was carried down to the paraspinal fascia. The fascia was then split over the spinous processes and subperiosteal dissection carried down on the right side at the anticipated L4 and L5 lateral pars. Metallic instruments were placed over the lateral pars at each level and another cross-table lateral x-ray confirmed that I was dorsal to the right L4-5 and L5-S1 foramina. A permanent cesar was made in the lateral pars at each level.    A Quintero retractor was placed at L4-5 initially and the microscope was brought  in for better visualization. I used the high speed gulshan to thin out the mid-lateral pars of L4 and perform a superolateral partial facetectomy on L4-5. A Kerrison was then used to complete the bony resection. I was careful to leave the majority of the pars and facet joints for stability purposes. A curette was then used to remove some of the lateral extent of the ligamentum flavum, facet joint capsule above and below and the intertransverse membrane. As this soft tissue flap was retracted laterally the exiting L4 nerve root came into view and was displaced superiorly and dorsally by a foraminal/lateral disc protrusion. After finding the interval between the inferior aspect of the nerve and the disc herniation a nerve root retractor was placed and a 15 blade was used to make annulotomy. There was some soft extruded material which was removed with the ball tipped probe and pituitary and then I thinned out bulging/redundant annulus using the pituitary as well since this was contributing to the foraminal/lateral stensois. After this was done I could use the Demi probe to palpate into the foramen and also laterally out beyond the foramen following the course of the L4 nerve. Decompression was felt to be complete at this time. I copiously irrigated the surgical site. I used bone wax on the bony resection for hemostasis and bipolar in the soft tissues. Floseal was placed over the nerve which now sat in anatomic position at this point. The self-retaining rectactor was removed and the parapinal muscles were inspected for any bleeding which was cauterized with the bipolar.     I then moved the retractor down to L5-S1 where I found my prior cesar in the lateral pars. In similar fashion to above I used the gulshan to thin out the mid-lateral pars of L5 and also to initiate partial superolateral facetectomy of L5-S1. The facet joint was quite hypertrophic here. After thinning this out with the gulshan I used the Kerrison to resect  some additional lateral pars taking care to leave enough pars and facet for stability purposes. I continued bony decompression until I was about at the medial aspect of the L5 pedicle such that I could remove the medial aspect of the ascending articular process of S1 which was thought to be one of the major contributing factors to the stenosis. Once this was complete I then used the ball probe to find the margin between the lateral extent of the ligamentum and the intertansverse membrane and the underlying L5 exiting nerve root. From here I was able to use the kerrison to resect the soft tissue and facet capsule also contributing to the foraminal stenosis. At this point the nerve appeared visually to be decompressed and I I could now pass the Demi probe along its course without any obvious obstruction. There was not felt to be any underlying soft disc herniation. The surgical site was copiously irrigated. Some bone wax was placed on the bleeding bone ends. Floseal was placed over the nerve root. The retractor was removed and I used the bipolar maintain hemostasis of the adjacent paraspinal muscles. The fascia was then closed with #1 vicrul. I injected 0.25% marcaine into the right paraspinal muscles. The deep dermal layer was closed with 2-0 Vicryl and then a running 3-0 stratafix in the subcuticular layer. A sterile dressing was placed. All sponge and needle counts were correct in the end. The patient was turned back to supine, extubated without incident and transported to the PACU in stable condition.           Condition: Stable     Weight bearing status: Weight bearing as tolerated     Activity:            Anticoagulation plan:    Plan:      Holland Ritchie MD  [facility]  Date:  8/24/2022  9:49 AM   Activity as tolerated  Patient may move about with assist as indicated or with supervision  No lifting >10lbs. No excessive bending/twisting through low back.    Ambulation and mechanical  prophylaxis.    Discharge when Phase II criteria met. Periop antibiotics. Multimodal pain control. Follow up 3 weeks.

## 2022-08-24 NOTE — DISCHARGE INSTRUCTIONS
Lumbar Spine Surgery Discharge Instructions    Your surgery was: Right L4-5 discectomy, right L5-S1 foraminotomy    Your surgeon is: Holland Ritchie MD    Restrictions after lumbar surgery:  - You should not do any excessive bending or twisting of your back beyond that needed to get in and out of a bed/chair, a car, or other similar daily activities.    - No lifting greater than 10 lbs (approximately what 1 gallon of milk weighs) until you are instructed that it is okay at your clinic follow-up visit.    - You should not drive a car or operate heavy machinery if you are taking prescribed narcotic pain medication    - Wound Care Instructions: Under your operative site dressing there are steri strips (small band-aids that go over the incision site). You can remove your dressing three days after surgery prior to your first time showering. Leave steri strips on until they fall off on their own or until you return to clinic for your post-operative appointment. It is okay to shower and get your wound wet, just do not let the water spray directly on your incision. Do not soak in a bathtub or swimming pool until you are told it is okay to do so when you return to clinic. The sutures are all buried under the skin and will dissolve on their own with time. If there is any drainage from the incision then you should place a new dry gauze dressing over it after a shower. If the incision and steri strips are dry then you can leave it without a dressing.    - Walking is your main physical therapy for now - we generally will not order PT unless it is determined at a later date in clinic that this is necessary. You should generally try to do at least short walks several times daily.    - If you are prescribed narcotic pain medications (Oxycodone, Norco, Percocet, Tylenol #3, Dilaudid, etc) then you should try to wean off of them as tolerated. These are AS NEEDED medications, so if you are not having significant pain you should try to  take fewer pills at a time or spread out the doses out over a longer period of time than is written on the prescription. As an example if you are prescribed 1-2 pills of pain medication every 4 hours AS NEEDED for pain, then you should begin taking only 1 pill every 4 hours as your pain tolerates. Then when 1 pill is giving good pain relief you should try to spread the doses out longer than 4 hours apart as you can tolerate it.    - You should avoid taking any more pain medications than is written on the prescription. In the event that you run out of the pills prior to when you should based on the written instructions, it is likely that your insurance provider will deny paying for a refill early.    - If your pain pill contains Tylenol (i.e. Percocet, Norco, Tylenol #3) and you are also taking additional Tylenol/acetaminophen as a pain medication, ensure that you are not taking too much tylenol. Prescription narcotic medications that contain Tylenol usually have 325mg of Tylenol per pill and over-the-counter medications have variable dose of Tylenol. You should not exceed 4,000mg of Tylenol in a 24-hour period.    Call the office if you have any questions/concerns or are experiencing the following:  - increasing drainage from the incision  - foul-smelling or malodorous drainage from the incision  - increasing pain not controlled by your prescribed medications  - inability to urinate  - new onset of weakness, numbness or severe pain in the extremities  - bowel/bladder incontinence  - Fever greater than 101.5 degrees  - Nausea/vomitting causing inability to eat food or medications    During normal business hours 7:30AM to 5:00PM on Monday-Friday you can reach my clinical assistant at [phone] and after hours you can reach the call-center for on-call physician at [phone]

## 2022-08-24 NOTE — ANESTHESIA POSTPROCEDURE EVALUATION
Patient: Jay Carter    Procedure: Procedure(s):  Lumbar 4-5 Lateral Discectomy,Right Lumbar 5-Sacral 1 Foraminotomy-right       Anesthesia Type:  General    Note:  Disposition: Outpatient   Postop Pain Control: Uneventful            Sign Out: Well controlled pain   PONV: No   Neuro/Psych: Uneventful            Sign Out: Acceptable/Baseline neuro status   Airway/Respiratory: Uneventful            Sign Out: Acceptable/Baseline resp. status   CV/Hemodynamics: Uneventful            Sign Out: Acceptable CV status; No obvious hypovolemia; No obvious fluid overload   Other NRE: NONE   DID A NON-ROUTINE EVENT OCCUR? No           Last vitals:  Vitals Value Taken Time   /75 08/24/22 1045   Temp 36.5  C (97.7  F) 08/24/22 1043   Pulse 75 08/24/22 1052   Resp 8 08/24/22 1052   SpO2 95 % 08/24/22 1054   Vitals shown include unvalidated device data.    Electronically Signed By: EVGENY Medina CRNA  August 24, 2022  11:36 AM

## 2022-08-24 NOTE — ANESTHESIA PROCEDURE NOTES
Airway       Patient location during procedure: OR       Procedure Start/Stop Times: 8/24/2022 7:42 AM  Staff -        CRNA: Radha Vigil APRN CRNA       Other Anesthesia Staff: Nanda Culp RN       Performed By: CRNA and SRNA  Consent for Airway        Urgency: elective  Indications and Patient Condition       Indications for airway management: petty-procedural       Induction type:intravenous       Mask difficulty assessment: 2 - vent by mask + OA or adjuvant +/- NMBA    Final Airway Details       Final airway type: endotracheal airway       Successful airway: ETT - single  Endotracheal Airway Details        ETT size (mm): 7.5       Cuffed: yes       Cuff volume (mL): 8       Successful intubation technique: video laryngoscopy       VL Blade Size: Drew 4       Grade View of Cords: 1       Adjucts: stylet       Position: Right       Measured from: lips       Secured at (cm): 22       Bite block used: Soft    Post intubation assessment        Placement verified by: capnometry, equal breath sounds and chest rise        Number of attempts at approach: 1       Secured with: silk tape       Ease of procedure: easy       Dentition: Unchanged and Intact    Medication(s) Administered   Medication Administration Time: 8/24/2022 7:42 AM

## 2022-10-29 ENCOUNTER — HEALTH MAINTENANCE LETTER (OUTPATIENT)
Age: 72
End: 2022-10-29

## 2023-03-28 ENCOUNTER — TRANSCRIBE ORDERS (OUTPATIENT)
Dept: OTHER | Age: 73
End: 2023-03-28

## 2023-03-28 DIAGNOSIS — M54.2 CERVICAL PAIN: ICD-10-CM

## 2023-03-28 DIAGNOSIS — M25.512 BILATERAL SHOULDER PAIN: Primary | ICD-10-CM

## 2023-03-28 DIAGNOSIS — M25.511 BILATERAL SHOULDER PAIN: Primary | ICD-10-CM

## 2023-04-02 ENCOUNTER — HEALTH MAINTENANCE LETTER (OUTPATIENT)
Age: 73
End: 2023-04-02

## 2023-04-03 ENCOUNTER — HOSPITAL ENCOUNTER (OUTPATIENT)
Dept: PHYSICAL THERAPY | Facility: CLINIC | Age: 73
Setting detail: THERAPIES SERIES
Discharge: HOME | End: 2023-04-03
Attending: ORTHOPAEDIC SURGERY
Payer: COMMERCIAL

## 2023-04-03 DIAGNOSIS — M54.2 CERVICAL PAIN: ICD-10-CM

## 2023-04-03 DIAGNOSIS — M25.512 BILATERAL SHOULDER PAIN: ICD-10-CM

## 2023-04-03 DIAGNOSIS — M25.511 BILATERAL SHOULDER PAIN: ICD-10-CM

## 2023-04-03 PROCEDURE — 97140 MANUAL THERAPY 1/> REGIONS: CPT | Mod: GP | Performed by: PHYSICAL THERAPIST

## 2023-04-03 PROCEDURE — 97161 PT EVAL LOW COMPLEX 20 MIN: CPT | Mod: GP | Performed by: PHYSICAL THERAPIST

## 2023-04-03 PROCEDURE — 97110 THERAPEUTIC EXERCISES: CPT | Mod: GP | Performed by: PHYSICAL THERAPIST

## 2023-04-03 NOTE — PROGRESS NOTES
04/03/23 1300   General Information   Type of Visit Initial OP Ortho PT Evaluation   Start of Care Date 04/03/23   Referring Physician Dave   Patient/Family Goals Statement regain shoulder strength   Orders Evaluate and Treat   Date of Order 03/27/23   Medical Diagnosis Bilat shoulder and neck pain   Surgical/Medical history reviewed Yes   Precautions/Limitations no known precautions/limitations   Body Part(s)   Body Part(s) Cervical Spine;Shoulder   Presentation and Etiology   Pertinent history of current problem (include personal factors and/or comorbidities that impact the POC) shoulders have been sore for years.  multiple surgeries.  may have retorn the shoulders.  multiple CSIs that seemed to help.  neck pain.  recent history of bilat arms falling asleep.   Impairments A. Pain;D. Decreased ROM;E. Decreased flexibility;H. Impaired gait   Functional Limitations perform activities of daily living;perform desired leisure / sports activities   Symptom Location bilat shoulders, neck and low back   How/Where did it occur With repetition/overuse;From insidious onset;From Degenerative Joint Disease   Onset date of current episode/exacerbation 12/07/22   Chronicity New   Pain rating (0-10 point scale) Best (/10);Worst (/10)   Best (/10) 0   Worst (/10) 8   Pain quality A. Sharp;B. Dull;D. Burning;C. Aching   Frequency of pain/symptoms B. Intermittent   Pain/symptoms are: Worse in the morning   Pain/symptoms exacerbated by G. Certain positions;H. Overhead reach   Pain/symptoms eased by C. Rest;G. Heat;H. Cold   Progression of symptoms since onset: Improved   Fall Risk Screen   Have you fallen 2 or more times in the past year? No   Have you fallen and had an injury in the past year? No   Is patient a fall risk? No   Abuse Screen (yes response referral indicated)   Feels Unsafe at Home or Work/School no   Feels Threatened by Someone no   Does Anyone Try to Keep You From Having Contact with Others or Doing Things  Outside Your Home? no   Shoulder Objective Findings   Side (if bilateral, select both right and left) Right;Left   Cervical Screen (ROM, quadrant) R rot 30, L55   Thoracic Mobility Screen very stiff CTJ   Shoulder ROM Comment AROM   Scapulothoracic Rhythm UT sub   Neer's Test +   Killian-Louie Test +   Coracoid Test +   Accessory Motion/Joint Mobility R C7, C23 bilat, R OA, R C5.  FRS L T3, RT1, L 1st rib sup, R 3rd rib post   Posture very rounded   Right Shoulder Flexion AROM 145   Right Shoulder Flexion PROM 150   Left Shoulder Flexion AROM 120   Left Shoulder Flexion PROM 130   Right Shoulder Abduction AROM 120   Right Shoulder ER AROM 60   Right Shoulder ER PROM 80   Left Shoulder ER AROM 50   Left Shoulder ER PROM 80   Right Shoulder IR AROM L4   Right Shoulder IR PROM 20   Left Shoulder IR AROM L5   Left Shoulder IR PROM 20   Right Shoulder Flexion Strength 4   Left Shoulder Flexion Strength 4   Right Shoulder Abduction Strength 4   Left Shoulder Abduction Strength 4   Right Shoulder ER Strength 4-   Left Shoulder ER Strength 4   Right Shoulder IR Strength 4+   Left Shoulder IR Strength 4+   Right Shoulder Extension Strength 4+ crepitance   Left Shoulder Extension Strength 4+   Planned Therapy Interventions   Planned Therapy Interventions ROM;strengthening;stretching;manual therapy;joint mobilization;neuromuscular re-education   Clinical Impression   Criteria for Skilled Therapeutic Interventions Met yes, treatment indicated   PT Diagnosis bilat shoulder and neck pain   Influenced by the following impairments pain, stiff, weak   Functional limitations due to impairments reach, lift, sleep   Clinical Presentation Stable/Uncomplicated   Clinical Presentation Rationale multiple surgeries, chronic pain   Clinical Decision Making (Complexity) Low complexity   Therapy Frequency 1 time/week   Predicted Duration of Therapy Intervention (days/wks) 8wk   Risk & Benefits of therapy have been explained Yes   Patient,  Family & other staff in agreement with plan of care Yes   Education Assessment   Preferred Learning Style Demonstration   Barriers to Learning No barriers   ORTHO GOALS   PT Ortho Eval Goals 1;2;3;4   Ortho Goal 1   Goal Identifier 1   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

## 2023-04-10 ENCOUNTER — HOSPITAL ENCOUNTER (OUTPATIENT)
Dept: PHYSICAL THERAPY | Facility: CLINIC | Age: 73
Setting detail: THERAPIES SERIES
Discharge: HOME | End: 2023-04-10
Attending: ORTHOPAEDIC SURGERY
Payer: COMMERCIAL

## 2023-04-10 PROCEDURE — 97140 MANUAL THERAPY 1/> REGIONS: CPT | Mod: GP | Performed by: PHYSICAL THERAPIST

## 2023-04-10 PROCEDURE — 97110 THERAPEUTIC EXERCISES: CPT | Mod: GP | Performed by: PHYSICAL THERAPIST

## 2023-04-17 ENCOUNTER — HOSPITAL ENCOUNTER (OUTPATIENT)
Dept: PHYSICAL THERAPY | Facility: CLINIC | Age: 73
Setting detail: THERAPIES SERIES
Discharge: HOME | End: 2023-04-17
Attending: ORTHOPAEDIC SURGERY
Payer: COMMERCIAL

## 2023-04-17 PROCEDURE — 97110 THERAPEUTIC EXERCISES: CPT | Mod: GP | Performed by: PHYSICAL THERAPIST

## 2023-06-05 ENCOUNTER — THERAPY VISIT (OUTPATIENT)
Dept: PHYSICAL THERAPY | Facility: CLINIC | Age: 73
End: 2023-06-05
Payer: COMMERCIAL

## 2023-06-05 DIAGNOSIS — M25.512 BILATERAL SHOULDER PAIN: ICD-10-CM

## 2023-06-05 DIAGNOSIS — M25.511 BILATERAL SHOULDER PAIN: ICD-10-CM

## 2023-06-05 PROCEDURE — 97110 THERAPEUTIC EXERCISES: CPT | Mod: GP | Performed by: PHYSICAL THERAPIST

## 2023-06-05 PROCEDURE — 97140 MANUAL THERAPY 1/> REGIONS: CPT | Mod: GP | Performed by: PHYSICAL THERAPIST

## 2023-06-15 ENCOUNTER — THERAPY VISIT (OUTPATIENT)
Dept: PHYSICAL THERAPY | Facility: CLINIC | Age: 73
End: 2023-06-15
Attending: ORTHOPAEDIC SURGERY
Payer: COMMERCIAL

## 2023-06-15 DIAGNOSIS — M25.511 BILATERAL SHOULDER PAIN: Primary | ICD-10-CM

## 2023-06-15 DIAGNOSIS — M25.512 BILATERAL SHOULDER PAIN: Primary | ICD-10-CM

## 2023-06-15 PROCEDURE — 97110 THERAPEUTIC EXERCISES: CPT | Mod: GP | Performed by: PHYSICAL THERAPIST

## 2023-06-16 NOTE — PROGRESS NOTES
[facility]                                                                                   OUTPATIENT PHYSICAL THERAPY      PLAN OF TREATMENT FOR OUTPATIENT REHABILITATION   Patient's Last Name, First Name, Jay Burleson YOB: 1950   Provider's Name   [facility]   Medical Record No.  [id number]     Onset Date:   4/1/22 Start of Care Date:    4/3/23   Medical Diagnosis:     bilat shoulder pain    PT Treatment Diagnosis:   bilat shoulder pain Plan of Treatment  Frequency/Duration:  / 1x/wk x 12 weeks    Certification date from  4/3/23  to    6/26/23       See note for plan of treatment details and functional goals     Diego Cheek, PT                         I CERTIFY THE NEED FOR THESE SERVICES FURNISHED UNDER        THIS PLAN OF TREATMENT AND WHILE UNDER MY CARE .             Physician Signature               Date    X_____________________________________________________                        Referring Provider:  Nico Acosta      Initial Assessment  See Epic Evaluation-

## 2023-06-29 ENCOUNTER — THERAPY VISIT (OUTPATIENT)
Dept: PHYSICAL THERAPY | Facility: CLINIC | Age: 73
End: 2023-06-29
Attending: ORTHOPAEDIC SURGERY
Payer: COMMERCIAL

## 2023-06-29 DIAGNOSIS — M25.511 CHRONIC PAIN OF BOTH SHOULDERS: Primary | ICD-10-CM

## 2023-06-29 DIAGNOSIS — G89.29 CHRONIC PAIN OF BOTH SHOULDERS: Primary | ICD-10-CM

## 2023-06-29 DIAGNOSIS — M25.512 CHRONIC PAIN OF BOTH SHOULDERS: Primary | ICD-10-CM

## 2023-06-29 PROCEDURE — 97110 THERAPEUTIC EXERCISES: CPT | Mod: GP | Performed by: PHYSICAL THERAPIST

## 2023-06-29 PROCEDURE — 97140 MANUAL THERAPY 1/> REGIONS: CPT | Mod: GP | Performed by: PHYSICAL THERAPIST

## 2023-06-30 NOTE — PROGRESS NOTES
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   Rationale to maximize safety and independence with performance of ADLs and functional tasks   Target Date 04/24/23   Date Met 06/15/23   PT Goal 2   Goal Description pt will be able to [place] without pain   Rationale to maximize safety and independence with performance of ADLs and functional tasks   Target Date 05/15/23   Date Met 06/15/23   PT Goal 3   Goal Description pt will be able to drive without neck pain   Rationale to maximize safety and independence with performance of ADLs and functional tasks   Goal Progress improved   Target Date 05/15/23   Subjective Report   Subjective Report stiffer or more sore since last rx.  not sure why, more stiff with lifting.  the right shoulder is doing very well with \"normal ROM\".  The left was near normal then got aggravated last week and is now difficult to elevate again but still able to get to 150.   Objective Measure 1   Objective Measure arc 70+ elevation today on L.   Details R AROM GHJ WFL   Objective Measure 2   Objective Measure GH ER 4-/5 with pain today   Therapeutic Procedure/Exercise   Therapeutic Procedures: strength, endurance, ROM, flexibillity minutes (16440) 20   Ther Proc 1 - Details manual PROM IR and ER and abd with cr.  added sleeper stretch.  hold strength   Skilled Intervention focus on stretching   Patient Response/Progress better AROM after rx but stiffer today before   Manual Therapy   Manual Therapy: " Mobilization, MFR, MLD, friction massage minutes (52589) 10   Skilled Intervention GH mobility   Patient Response/Progress improved AROM   Manual Therapy 1 - Details inf/post GHJ mob grade 3-4, inf SCJ, ACJ dist grade 3.   Plan   Plan for next session will need more visits to get back on track   Total Session Time   Timed Code Treatment Minutes 30   Total Treatment Time (sum of timed and untimed services) 30   Medicare Claim Information   Medical Diagnosis bilateral shoulder pain   PT Diagnosis bilatera shoulder pain   Start of Care Date 04/03/23   Onset date of current episode/exacerbation 04/01/22         [facility]                                                                                   OUTPATIENT PHYSICAL THERAPY    PLAN OF TREATMENT FOR OUTPATIENT REHABILITATION   Patient's Last Name, First Name, JAISONJay Salcedo YOB: 1950   Provider's Name   [facility]   Medical Record No.  [id number]     Onset Date: 04/01/22  Start of Care Date: 04/03/23     Medical Diagnosis:  bilat shoulders      PT Treatment Diagnosis:  shoulder pain Plan of Treatment  Frequency/Duration: 1x/wk/ 6wk    Certification date from 06/26/23 to 08/07/23         See note for plan of treatment details and functional goals     Diego Cheek, PT                         I CERTIFY THE NEED FOR THESE SERVICES FURNISHED UNDER        THIS PLAN OF TREATMENT AND WHILE UNDER MY CARE .             Physician Signature               Date    X_____________________________________________________                    Referring Provider:  Nico Acosta      Initial Assessment  See Epic Evaluation- Start of Care Date: 04/03/23            PLAN  Continue therapy per current plan of care.    Beginning/End Dates of Progress Note Reporting Period:    4/3/23 to 06/26/2023    Referring Provider:  Nico Acosta

## 2023-07-11 ENCOUNTER — THERAPY VISIT (OUTPATIENT)
Dept: PHYSICAL THERAPY | Facility: CLINIC | Age: 73
End: 2023-07-11
Attending: ORTHOPAEDIC SURGERY
Payer: COMMERCIAL

## 2023-07-11 DIAGNOSIS — M25.512 BILATERAL SHOULDER PAIN: Primary | ICD-10-CM

## 2023-07-11 DIAGNOSIS — M25.511 BILATERAL SHOULDER PAIN: Primary | ICD-10-CM

## 2023-07-11 PROCEDURE — 97140 MANUAL THERAPY 1/> REGIONS: CPT | Mod: GP | Performed by: PHYSICAL THERAPIST

## 2023-07-11 PROCEDURE — 97110 THERAPEUTIC EXERCISES: CPT | Mod: GP | Performed by: PHYSICAL THERAPIST

## 2023-07-20 ENCOUNTER — OFFICE VISIT (OUTPATIENT)
Dept: DERMATOLOGY | Facility: CLINIC | Age: 73
End: 2023-07-20
Payer: COMMERCIAL

## 2023-07-20 DIAGNOSIS — L81.4 LENTIGO: ICD-10-CM

## 2023-07-20 DIAGNOSIS — Z85.828 HISTORY OF BASAL CELL CANCER: ICD-10-CM

## 2023-07-20 DIAGNOSIS — L30.0 NUMMULAR ECZEMA: Primary | ICD-10-CM

## 2023-07-20 DIAGNOSIS — Z86.006 HISTORY OF MELANOMA IN SITU: ICD-10-CM

## 2023-07-20 DIAGNOSIS — L57.0 ACTINIC KERATOSIS: ICD-10-CM

## 2023-07-20 DIAGNOSIS — D22.9 MULTIPLE BENIGN NEVI: ICD-10-CM

## 2023-07-20 DIAGNOSIS — D18.01 CHERRY ANGIOMA: ICD-10-CM

## 2023-07-20 DIAGNOSIS — L82.1 SEBORRHEIC KERATOSIS: ICD-10-CM

## 2023-07-20 PROCEDURE — 17003 DESTRUCT PREMALG LES 2-14: CPT | Mod: 59 | Performed by: PHYSICIAN ASSISTANT

## 2023-07-20 PROCEDURE — 17000 DESTRUCT PREMALG LESION: CPT | Mod: 59 | Performed by: PHYSICIAN ASSISTANT

## 2023-07-20 PROCEDURE — 17110 DESTRUCTION B9 LES UP TO 14: CPT | Performed by: PHYSICIAN ASSISTANT

## 2023-07-20 PROCEDURE — 99213 OFFICE O/P EST LOW 20 MIN: CPT | Mod: 25 | Performed by: PHYSICIAN ASSISTANT

## 2023-07-20 RX ORDER — FLUOCINONIDE 0.5 MG/G
CREAM TOPICAL
Qty: 60 G | Refills: 1 | Status: SHIPPED | OUTPATIENT
Start: 2023-07-20

## 2023-07-20 ASSESSMENT — PAIN SCALES - GENERAL: PAINLEVEL: NO PAIN (0)

## 2023-07-20 NOTE — PROGRESS NOTES
Jay Carter is an extremely pleasant 71 year old year old male patient here today for skin check. He denies any painful or bleeding skin lesions. Notes a few rough areas on top of scalp.  Patient has no other skin complaints today.  Remainder of the HPI, Meds, PMH, Allergies, FH, and SH was reviewed in chart.    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  Past Medical History:   Diagnosis Date    Basal cell carcinoma        Past Surgical History:   Procedure Laterality Date    ARTHROPLASTY KNEE Left 8/24/2021    Procedure: Left Total Knee Arthroplasty;  Surgeon: Nico Acosta MD;  Location: [facility]    ARTHROSCOPY SHOULDER      converted to open, complications    ARTHROSCOPY SHOULDER ROTATOR CUFF REPAIR Bilateral     BACK SURGERY      CERVICAL LAMINECTOMY      hemilaminectomy C6-7    DISCECTOMY LUMBAR POSTERIOR MICROSCOPIC TWO LEVELS Right 8/24/2022    Procedure: Lumbar 4-5 Lateral Discectomy,Right Lumbar 5-Sacral 1 Foraminotomy-right;  Surgeon: Holland Ritchie MD;  Location: [facility]    OTHER SURGICAL HISTORY      excision pilonidal cyst    SD LAP,PROSTATECTOMY,RADICAL,W/NERVE SPARE,INCL ROBOTIC Bilateral 2/6/2019    Procedure: ROBOTIC RADICAL RETROPUBIC PROSTATECTOMY WITH BILATERAL PELVIC LYMPH NODE DISSECTION;  Surgeon: Sean Perez MD;  Location: [facility];  Service: Urology    RELEASE TRIGGER FINGER Bilateral     SPINAL CORD DECOMPRESSION      L5    WISDOM TOOTH EXTRACTION          Family History   Problem Relation Age of Onset    Heart Disease Mother     Myocardial Infarction Brother         before age 65    Bone Cancer Brother     Hypertension Brother     Rheumatoid Arthritis Brother        Social History     Socioeconomic History    Marital status:      Spouse name: Not on file    Number of children: Not on file    Years of education: Not on file    Highest education level: Not on file   Occupational History    Not on file   Tobacco Use     Smoking status: Former     Packs/day: 0.00     Years: 3.00     Pack years: 0.00     Types: Cigarettes    Smokeless tobacco: Never   Substance and Sexual Activity    Alcohol use: Yes     Comment: occasional    Drug use: No    Sexual activity: Not on file   Other Topics Concern    Parent/sibling w/ CABG, MI or angioplasty before 65F 55M? Yes     Comment: brother  of heart disease   Social History Narrative    Not on file     Social Determinants of Health     Financial Resource Strain: Not on file   Food Insecurity: Not on file   Transportation Needs: Not on file   Physical Activity: Not on file   Stress: Not on file   Social Connections: Not on file   Intimate Partner Violence: Not on file   Housing Stability: Not on file       Outpatient Encounter Medications as of 2023   Medication Sig Dispense Refill    acetaminophen (TYLENOL) 325 MG tablet Take 2 tablets (650 mg) by mouth every 4 hours as needed for other (mild pain) 100 tablet 0    buPROPion (WELLBUTRIN SR) 200 MG 12 hr tablet Take 1 tablet by mouth 2 times daily AM and noon      cholecalciferol 50 MCG (2000 UT) CAPS Take 2,000 Units by mouth daily       enalapril (VASOTEC) 20 MG tablet Take 20 mg by mouth daily       fluocinonide (LIDEX) 0.05 % external cream Apply sparingly twice daily for 3-4 weeks. 60 g 1    hydrochlorothiazide (HYDRODIURIL) 25 MG tablet Take 25 mg by mouth daily       HYDROcodone-acetaminophen (NORCO) 5-325 MG tablet Take 1-2 tablets by mouth every 4 hours as needed for moderate to severe pain 30 tablet 0    hydrOXYzine (ATARAX) 25 MG tablet Take 1 tablet (25 mg) by mouth every 6 hours as needed for other (adjuvent pain, muscle spasm) 30 tablet 0    ibuprofen (ADVIL/MOTRIN) 600 MG tablet Take 1 tablet (600 mg) by mouth every 6 hours as needed for pain (mild) 30 tablet 0    Multiple Vitamins-Minerals (GILBERT MULTIVITAMIN FOR MEN) TABS Take 1 tablet by mouth daily       omeprazole (PRILOSEC OTC) 20 MG EC tablet Take 20 mg by mouth  twice a week On Sunday and Thursday      rosuvastatin (CRESTOR) 20 MG tablet Take 1 tablet by mouth daily       triamcinolone (NASACORT) 55 MCG/ACT nasal aerosol Spray 2 sprays in nostril every evening        No facility-administered encounter medications on file as of 7/20/2023.             O:   NAD, WDWN, Alert & Oriented, Mood & Affect wnl, Vitals stable   Here today alone   There were no vitals taken for this visit.   General appearance normal   Vitals stable   Alert, oriented and in no acute distress     Gritty papules on crown of scalp x 3, forehead x 2   Stuck on papules and brown macules on trunk and ext   Red papules on trunk  Brown papules and macules with regular pigment network and borders on torso and extremities     The remainder of skin exam is normal       Eyes: Conjunctivae/lids:Normal     ENT: Lips: normal    MSK:Normal    Cardiovascular: peripheral edema none    Pulm: Breathing Normal    Lymph Nodes: No Head and Neck Lymphadenopathy     Neuro/Psych: Orientation:Alert and Orientedx3 ; Mood/Affect:normal   A/P:  1. Actinic keratoses on crown of scalp x 3 and forehead x 2  LN2:  Treated with LN2 for 5s for 1-2 cycles. Warned risks of blistering, pain, pigment change, scarring, and incomplete resolution.  Advised patient to return if lesions do not completely resolve.  Wound care sheet given.  2. Inflamed seborrheic keratosis on right cheek x 1  LN2:  Treated with LN2 for 5s for 1-2 cycles. Warned risks of blistering, pain, pigment change, scarring, and incomplete resolution.  Advised patient to return if lesions do not completely resolve.  Wound care sheet given.  3. Dermatitis on legs   Use moisturizers twice daily.   Apply lidex as needed.   Return if not resolving in one month.   3. 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 I explained the need for  monthly skin exams including and taught the patient how to do this. Patient was asked about new or changing moles . I discussed with patient signs and symptoms that could arise in the setting of recurrent locoregional or metastatic disease. In addition, the need to undergo every 12 month dermatologic full skin survey and evaluation given that patients with a diagnosis of melanoma are at risk of recurrence (local and distant) and of subsequent de juwan melanoma.    4. Seborrheic keratosis, lentigo, angioma, benign nevi, history of BCC  BENIGN LESIONS DISCUSSED WITH PATIENT:  I discussed the specifics of tumor, prognosis, and genetics of benign lesions.  I explained that treatment of these lesions would be purely cosmetic and not medically neccessary.  I discussed with patient different removal options including excision, cautery and /or laser.      Nature and genetics of benign skin lesions dicussed with patient.  Signs and Symptoms of skin cancer discussed with patient.  ABCDEs of melanoma reviewed with patient.  Patient encouraged to perform monthly skin exams.  UV precautions reviewed with patient.  Risks of non-melanoma skin cancer discussed with patient   Return to clinic in one year or sooner if needed.

## 2023-07-20 NOTE — NURSING NOTE
Chief Complaint   Patient presents with     Skin Check       There were no vitals filed for this visit.  Wt Readings from Last 1 Encounters:   08/24/22 92.1 kg (203 lb)       Micheline Hood LPN .................7/20/2023

## 2023-07-20 NOTE — LETTER
7/20/2023         RE: Jay Carter  [address] [id number]        Dear Colleague,    Thank you for referring your patient, Jay Carter, to the [facility]. Please see a copy of my visit note below.    Jay Carter is an extremely pleasant 71 year old year old male patient here today for skin check. He denies any painful or bleeding skin lesions. Notes a few rough areas on top of scalp.  Patient has no other skin complaints today.  Remainder of the HPI, Meds, PMH, Allergies, FH, and SH was reviewed in chart.    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  Past Medical History:   Diagnosis Date     Basal cell carcinoma        Past Surgical History:   Procedure Laterality Date     ARTHROPLASTY KNEE Left 8/24/2021    Procedure: Left Total Knee Arthroplasty;  Surgeon: Nico Acosta MD;  Location: [facility]     ARTHROSCOPY SHOULDER      converted to open, complications     ARTHROSCOPY SHOULDER ROTATOR CUFF REPAIR Bilateral      BACK SURGERY       CERVICAL LAMINECTOMY      hemilaminectomy C6-7     DISCECTOMY LUMBAR POSTERIOR MICROSCOPIC TWO LEVELS Right 8/24/2022    Procedure: Lumbar 4-5 Lateral Discectomy,Right Lumbar 5-Sacral 1 Foraminotomy-right;  Surgeon: Holland Ritchie MD;  Location: [facility]     OTHER SURGICAL HISTORY      excision pilonidal cyst     OK LAP,PROSTATECTOMY,RADICAL,W/NERVE SPARE,INCL ROBOTIC Bilateral 2/6/2019    Procedure: ROBOTIC RADICAL RETROPUBIC PROSTATECTOMY WITH BILATERAL PELVIC LYMPH NODE DISSECTION;  Surgeon: Sean Perez MD;  Location: [facility];  Service: Urology     RELEASE TRIGGER FINGER Bilateral      SPINAL CORD DECOMPRESSION      L5     WISDOM TOOTH EXTRACTION          Family History   Problem Relation Age of Onset     Heart Disease Mother      Myocardial Infarction Brother         before age 65     Bone Cancer Brother      Hypertension Brother      Rheumatoid  Arthritis Brother        Social History     Socioeconomic History     Marital status:      Spouse name: Not on file     Number of children: Not on file     Years of education: Not on file     Highest education level: Not on file   Occupational History     Not on file   Tobacco Use     Smoking status: Former     Packs/day: 0.00     Years: 3.00     Pack years: 0.00     Types: Cigarettes     Smokeless tobacco: Never   Substance and Sexual Activity     Alcohol use: Yes     Comment: occasional     Drug use: No     Sexual activity: Not on file   Other Topics Concern     Parent/sibling w/ CABG, MI or angioplasty before 65F 55M? Yes     Comment: brother  of heart disease   Social History Narrative     Not on file     Social Determinants of Health     Financial Resource Strain: Not on file   Food Insecurity: Not on file   Transportation Needs: Not on file   Physical Activity: Not on file   Stress: Not on file   Social Connections: Not on file   Intimate Partner Violence: Not on file   Housing Stability: Not on file       Outpatient Encounter Medications as of 2023   Medication Sig Dispense Refill     acetaminophen (TYLENOL) 325 MG tablet Take 2 tablets (650 mg) by mouth every 4 hours as needed for other (mild pain) 100 tablet 0     buPROPion (WELLBUTRIN SR) 200 MG 12 hr tablet Take 1 tablet by mouth 2 times daily AM and noon       cholecalciferol 50 MCG (2000 UT) CAPS Take 2,000 Units by mouth daily        enalapril (VASOTEC) 20 MG tablet Take 20 mg by mouth daily        fluocinonide (LIDEX) 0.05 % external cream Apply sparingly twice daily for 3-4 weeks. 60 g 1     hydrochlorothiazide (HYDRODIURIL) 25 MG tablet Take 25 mg by mouth daily        HYDROcodone-acetaminophen (NORCO) 5-325 MG tablet Take 1-2 tablets by mouth every 4 hours as needed for moderate to severe pain 30 tablet 0     hydrOXYzine (ATARAX) 25 MG tablet Take 1 tablet (25 mg) by mouth every 6 hours as needed for other (adjuvent pain, muscle  spasm) 30 tablet 0     ibuprofen (ADVIL/MOTRIN) 600 MG tablet Take 1 tablet (600 mg) by mouth every 6 hours as needed for pain (mild) 30 tablet 0     Multiple Vitamins-Minerals (GILBERT MULTIVITAMIN FOR MEN) TABS Take 1 tablet by mouth daily        omeprazole (PRILOSEC OTC) 20 MG EC tablet Take 20 mg by mouth twice a week On Sunday and Thursday       rosuvastatin (CRESTOR) 20 MG tablet Take 1 tablet by mouth daily        triamcinolone (NASACORT) 55 MCG/ACT nasal aerosol Spray 2 sprays in nostril every evening        No facility-administered encounter medications on file as of 7/20/2023.             O:   NAD, WDWN, Alert & Oriented, Mood & Affect wnl, Vitals stable   Here today alone   There were no vitals taken for this visit.   General appearance normal   Vitals stable   Alert, oriented and in no acute distress     Gritty papules on crown of scalp x 3, forehead x 2   Stuck on papules and brown macules on trunk and ext   Red papules on trunk  Brown papules and macules with regular pigment network and borders on torso and extremities     The remainder of skin exam is normal       Eyes: Conjunctivae/lids:Normal     ENT: Lips: normal    MSK:Normal    Cardiovascular: peripheral edema none    Pulm: Breathing Normal    Lymph Nodes: No Head and Neck Lymphadenopathy     Neuro/Psych: Orientation:Alert and Orientedx3 ; Mood/Affect:normal   A/P:  1. Actinic keratoses on crown of scalp x 3 and forehead x 2  LN2:  Treated with LN2 for 5s for 1-2 cycles. Warned risks of blistering, pain, pigment change, scarring, and incomplete resolution.  Advised patient to return if lesions do not completely resolve.  Wound care sheet given.  2. Inflamed seborrheic keratosis on right cheek x 1  LN2:  Treated with LN2 for 5s for 1-2 cycles. Warned risks of blistering, pain, pigment change, scarring, and incomplete resolution.  Advised patient to return if lesions do not completely resolve.  Wound care sheet given.  3. Dermatitis on legs   Use  moisturizers twice daily.   Apply lidex as needed.   Return if not resolving in one month.   3. 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 I explained the need for monthly skin exams including and taught the patient how to do this. Patient was asked about new or changing moles . I discussed with patient signs and symptoms that could arise in the setting of recurrent locoregional or metastatic disease. In addition, the need to undergo every 12 month dermatologic full skin survey and evaluation given that patients with a diagnosis of melanoma are at risk of recurrence (local and distant) and of subsequent de juwan melanoma.    4. Seborrheic keratosis, lentigo, angioma, benign nevi, history of BCC  BENIGN LESIONS DISCUSSED WITH PATIENT:  I discussed the specifics of tumor, prognosis, and genetics of benign lesions.  I explained that treatment of these lesions would be purely cosmetic and not medically neccessary.  I discussed with patient different removal options including excision, cautery and /or laser.      Nature and genetics of benign skin lesions dicussed with patient.  Signs and Symptoms of skin cancer discussed with patient.  ABCDEs of melanoma reviewed with patient.  Patient encouraged to perform monthly skin exams.  UV precautions reviewed with patient.  Risks of non-melanoma skin cancer discussed with patient   Return to clinic in one year or sooner if needed.       Again, thank you for allowing me to participate in the care of your patient.        Sincerely,        Verona Lynch PA-C

## 2023-10-24 NOTE — PROGRESS NOTES
Is This A New Presentation, Or A Follow-Up?: Skin Lesion Pt arrived from surgery, oriented to room and unit routine. Denies need for pain medication at this time.    How Severe Is Your Skin Lesion?: mild Has Your Skin Lesion Been Treated?: not been treated

## 2023-11-15 PROBLEM — M25.512 BILATERAL SHOULDER PAIN: Status: RESOLVED | Noted: 2023-06-05 | Resolved: 2023-11-15

## 2023-11-15 PROBLEM — M25.511 BILATERAL SHOULDER PAIN: Status: RESOLVED | Noted: 2023-06-05 | Resolved: 2023-11-15

## 2023-11-15 NOTE — PROGRESS NOTES
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   Rationale to maximize safety and independence with performance of ADLs and functional tasks   Target Date 04/24/23   Date Met 06/15/23   PT Goal 2   Goal Description pt will be able to [place] without pain   Rationale to maximize safety and independence with performance of ADLs and functional tasks   Target Date 05/15/23   Date Met 06/15/23   PT Goal 3   Goal Description pt will be able to drive without neck pain   Rationale to maximize safety and independence with performance of ADLs and functional tasks   Goal Progress improved   Target Date 05/15/23   Subjective Report   Subjective Report L shoulder still more stiff and more consistently bad.  does not seem to be getting over the hump with AROM or strength on the Left.   Objective Measure 1   Objective Measure arc 70+ elevation today on L.   Details R AROM GHJ WFL   Objective Measure 2   Objective Measure GH ER 4-/5 with pain today   Therapeutic Procedure/Exercise   Therapeutic Procedures: strength, endurance, ROM, flexibillity minutes (11336) 20   Ther Proc 1 - Details manual PROM IR 45-80 deg supine, Bursa massage, flex and scaption PROM.  SLIR stretch review.   Skilled Intervention ROM, pain control   Patient Response/Progress better AROM after rx but stiffer today before   Manual Therapy   Manual Therapy: Mobilization, MFR, MLD, friction massage minutes (99889) 10   Manual Therapy 1 - Details inf/post GHJ mob grade  3-4, inf SCJ, ACJ dist grade 3.   Skilled Intervention GH mobility   Patient Response/Progress improved AROM   Total Session Time   Timed Code Treatment Minutes 30   Total Treatment Time (sum of timed and untimed services) 30   Medicare Claim Information   Medical Diagnosis bilateral shoulder pain   PT Diagnosis bilatera shoulder pain   Start of Care Date 04/03/23   Onset date of current episode/exacerbation 04/01/22     The pt met goals 1 and 2 and progressed to 3.    DISCHARGE  Reason for Discharge: Patient has met all goals.  Patient has failed to schedule further appointments.    Equipment Issued:     Discharge Plan: Patient to continue home program.    Referring Provider:  Nico Acosta

## 2024-06-08 ENCOUNTER — HEALTH MAINTENANCE LETTER (OUTPATIENT)
Age: 74
End: 2024-06-08

## 2024-07-23 ENCOUNTER — OFFICE VISIT (OUTPATIENT)
Dept: DERMATOLOGY | Facility: CLINIC | Age: 74
End: 2024-07-23
Payer: COMMERCIAL

## 2024-07-23 DIAGNOSIS — L81.4 LENTIGO: ICD-10-CM

## 2024-07-23 DIAGNOSIS — D18.01 CHERRY ANGIOMA: ICD-10-CM

## 2024-07-23 DIAGNOSIS — Z86.006 HISTORY OF MELANOMA IN SITU: ICD-10-CM

## 2024-07-23 DIAGNOSIS — L82.1 SEBORRHEIC KERATOSIS: ICD-10-CM

## 2024-07-23 DIAGNOSIS — L21.9 DERMATITIS, SEBORRHEIC: Primary | ICD-10-CM

## 2024-07-23 DIAGNOSIS — D22.9 MULTIPLE BENIGN NEVI: ICD-10-CM

## 2024-07-23 DIAGNOSIS — L57.0 ACTINIC KERATOSIS: ICD-10-CM

## 2024-07-23 DIAGNOSIS — Z85.828 HISTORY OF BASAL CELL CANCER: ICD-10-CM

## 2024-07-23 PROCEDURE — 17000 DESTRUCT PREMALG LESION: CPT | Performed by: PHYSICIAN ASSISTANT

## 2024-07-23 PROCEDURE — 17003 DESTRUCT PREMALG LES 2-14: CPT | Performed by: PHYSICIAN ASSISTANT

## 2024-07-23 PROCEDURE — 99213 OFFICE O/P EST LOW 20 MIN: CPT | Mod: 25 | Performed by: PHYSICIAN ASSISTANT

## 2024-07-23 RX ORDER — KETOCONAZOLE 20 MG/ML
SHAMPOO TOPICAL
Qty: 120 ML | Refills: 4 | Status: SHIPPED | OUTPATIENT
Start: 2024-07-23

## 2024-07-23 RX ORDER — FLUOCINONIDE TOPICAL SOLUTION USP, 0.05% 0.5 MG/ML
SOLUTION TOPICAL
Qty: 60 ML | Refills: 4 | Status: SHIPPED | OUTPATIENT
Start: 2024-07-23

## 2024-07-23 ASSESSMENT — PAIN SCALES - GENERAL: PAINLEVEL: NO PAIN (0)

## 2024-07-23 NOTE — LETTER
7/23/2024      Jay Carter  [address] [facility] [address]      Dear Colleague,    Thank you for referring your patient, Jay Carter, to the [facility]. Please see a copy of my visit note below.    Jay Carter is an extremely pleasant 73 year old year old male patient here today for skin check. He denies any painful or bleeding skin lesions. Notes a few rough areas on top of scalp.  Patient has no other skin complaints today.  Remainder of the HPI, Meds, PMH, Allergies, FH, and SH was reviewed in chart.    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  Past Medical History:   Diagnosis Date     Basal cell carcinoma      Malignant melanoma (H)        Past Surgical History:   Procedure Laterality Date     ARTHROPLASTY KNEE Left 8/24/2021    Procedure: Left Total Knee Arthroplasty;  Surgeon: Nico Acosta MD;  Location: [facility] OR     ARTHROSCOPY SHOULDER      converted to open, complications     ARTHROSCOPY SHOULDER ROTATOR CUFF REPAIR Bilateral      BACK SURGERY       CERVICAL LAMINECTOMY      hemilaminectomy C6-7     DISCECTOMY LUMBAR POSTERIOR MICROSCOPIC TWO LEVELS Right 8/24/2022    Procedure: Lumbar 4-5 Lateral Discectomy,Right Lumbar 5-Sacral 1 Foraminotomy-right;  Surgeon: Holland Ritchie MD;  Location: [facility] OR     OTHER SURGICAL HISTORY      excision pilonidal cyst     SD LAP,PROSTATECTOMY,RADICAL,W/NERVE SPARE,INCL ROBOTIC Bilateral 2/6/2019    Procedure: ROBOTIC RADICAL RETROPUBIC PROSTATECTOMY WITH BILATERAL PELVIC LYMPH NODE DISSECTION;  Surgeon: Sean Perez MD;  Location: [facility] OR;  Service: Urology     RELEASE TRIGGER FINGER Bilateral      SPINAL CORD DECOMPRESSION      L5     WISDOM TOOTH EXTRACTION          Family History   Problem Relation Age of Onset     Heart Disease Mother      Myocardial Infarction Brother         before age 65     Bone Cancer Brother      Hypertension Brother       Rheumatoid Arthritis Brother        Social History     Socioeconomic History     Marital status:      Spouse name: Not on file     Number of children: Not on file     Years of education: Not on file     Highest education level: Not on file   Occupational History     Not on file   Tobacco Use     Smoking status: Former     Current packs/day: 0.00     Types: Cigarettes     Smokeless tobacco: Never   Substance and Sexual Activity     Alcohol use: Yes     Comment: occasional     Drug use: No     Sexual activity: Not on file   Other Topics Concern     Parent/sibling w/ CABG, MI or angioplasty before 65F 55M? Yes     Comment: brother  of heart disease   Social History Narrative     Not on file     Social Determinants of Health     Financial Resource Strain: Low Risk  (2024)    Received from [employer]    Financial Resource Strain      Difficulty of Paying Living Expenses: 3      Difficulty of Paying Living Expenses: Not on file   Food Insecurity: No Food Insecurity (2024)    Received from [employer]    Food Insecurity      Worried About Running Out of Food in the Last Year: 1   Transportation Needs: No Transportation Needs (2024)    Received from [employer]    Transportation Needs      Lack of Transportation (Medical): 1   Physical Activity: Not on file   Stress: Not on file   Social Connections: Socially Integrated (2024)    Received from [employer]    Social Connections      Frequency of Communication with Friends and Family: 0   Interpersonal Safety: Not on file   Housing Stability: Low Risk  (2024)    Received from [employer]    Housing Stability      Unable to Pay for Housing in the Last Year: 1       Outpatient Encounter Medications as of 2024   Medication Sig Dispense Refill     acetaminophen (TYLENOL)  325 MG tablet Take 2 tablets (650 mg) by mouth every 4 hours as needed for other (mild pain) 100 tablet 0     buPROPion (WELLBUTRIN SR) 200 MG 12 hr tablet Take 1 tablet by mouth 2 times daily AM and noon       cholecalciferol 50 MCG (2000 UT) CAPS Take 2,000 Units by mouth daily        enalapril (VASOTEC) 20 MG tablet Take 20 mg by mouth daily        fluocinonide (LIDEX) 0.05 % external cream Apply sparingly twice daily for 3-4 weeks. 60 g 1     hydrochlorothiazide (HYDRODIURIL) 25 MG tablet Take 25 mg by mouth daily        HYDROcodone-acetaminophen (NORCO) 5-325 MG tablet Take 1-2 tablets by mouth every 4 hours as needed for moderate to severe pain 30 tablet 0     hydrOXYzine (ATARAX) 25 MG tablet Take 1 tablet (25 mg) by mouth every 6 hours as needed for other (adjuvent pain, muscle spasm) 30 tablet 0     ibuprofen (ADVIL/MOTRIN) 600 MG tablet Take 1 tablet (600 mg) by mouth every 6 hours as needed for pain (mild) 30 tablet 0     Multiple Vitamins-Minerals (GILBERT MULTIVITAMIN FOR MEN) TABS Take 1 tablet by mouth daily        omeprazole (PRILOSEC OTC) 20 MG EC tablet Take 20 mg by mouth twice a week On Sunday and Thursday       rosuvastatin (CRESTOR) 20 MG tablet Take 1 tablet by mouth daily        triamcinolone (NASACORT) 55 MCG/ACT nasal aerosol Spray 2 sprays in nostril every evening        No facility-administered encounter medications on file as of 7/23/2024.             O:   NAD, WDWN, Alert & Oriented, Mood & Affect wnl, Vitals stable   Here today alone   There were no vitals taken for this visit.   General appearance normal   Vitals stable   Alert, oriented and in no acute distress     Gritty papules on crown of scalp x 6, right temple x 1   Stuck on papules and brown macules on trunk and ext   Red papules on trunk  Brown papules and macules with regular pigment network and borders on torso and extremities   Pink scaly scalp   The remainder of skin exam is normal       Eyes: Conjunctivae/lids:Normal      ENT: Lips: normal    MSK:Normal    Pulm: Breathing Normal    Lymph Nodes: No Head and Neck Lymphadenopathy     Neuro/Psych: Orientation:Alert and Orientedx3 ; Mood/Affect:normal   A/P:  1. Actinic keratoses on crown of scalp x 6, right temple x 1   LN2:  Treated with LN2 for 5s for 1-2 cycles. Warned risks of blistering, pain, pigment change, scarring, and incomplete resolution.  Advised patient to return if lesions do not completely resolve.  Wound care sheet given.  2. Seborrheic Dermatitis   Use ketoconazole shampoo, leave on scalp for 5 minutes then rinse. Use twice weekly. Apply lidex solution twice weekly for two week then as needed.   3. 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 I explained the need for monthly skin exams including and taught the patient how to do this. Patient was asked about new or changing moles . I discussed with patient signs and symptoms that could arise in the setting of recurrent locoregional or metastatic disease. In addition, the need to undergo every 12 month dermatologic full skin survey and evaluation given that patients with a diagnosis of melanoma are at risk of recurrence (local and distant) and of subsequent de juwan melanoma.    4. Seborrheic keratosis, lentigo, angioma, benign nevi, history of BCC  BENIGN LESIONS DISCUSSED WITH PATIENT:  I discussed the specifics of tumor, prognosis, and genetics of benign lesions.  I explained that treatment of these lesions would be purely cosmetic and not medically neccessary.  I discussed with patient different removal options including excision, cautery and /or laser.      Nature and genetics of benign skin lesions dicussed with patient.  Signs and Symptoms of skin cancer discussed with patient.  ABCDEs of melanoma reviewed with patient.  Patient encouraged to perform monthly skin  exams.  UV precautions reviewed with patient.  Risks of non-melanoma skin cancer discussed with patient   Return to clinic in one year or sooner if needed.       Again, thank you for allowing me to participate in the care of your patient.        Sincerely,        Verona Lynch PA-C

## 2024-07-23 NOTE — PROGRESS NOTES
Jay Carter is an extremely pleasant 73 year old year old male patient here today for skin check. He denies any painful or bleeding skin lesions. Notes a few rough areas on top of scalp.  Patient has no other skin complaints today.  Remainder of the HPI, Meds, PMH, Allergies, FH, and SH was reviewed in chart.    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  Past Medical History:   Diagnosis Date    Basal cell carcinoma     Malignant melanoma (H)        Past Surgical History:   Procedure Laterality Date    ARTHROPLASTY KNEE Left 8/24/2021    Procedure: Left Total Knee Arthroplasty;  Surgeon: Nico Acosta MD;  Location: [facility]    ARTHROSCOPY SHOULDER      converted to open, complications    ARTHROSCOPY SHOULDER ROTATOR CUFF REPAIR Bilateral     BACK SURGERY      CERVICAL LAMINECTOMY      hemilaminectomy C6-7    DISCECTOMY LUMBAR POSTERIOR MICROSCOPIC TWO LEVELS Right 8/24/2022    Procedure: Lumbar 4-5 Lateral Discectomy,Right Lumbar 5-Sacral 1 Foraminotomy-right;  Surgeon: Holland Ritchie MD;  Location: [facility] OR    OTHER SURGICAL HISTORY      excision pilonidal cyst    DC LAP,PROSTATECTOMY,RADICAL,W/NERVE SPARE,INCL ROBOTIC Bilateral 2/6/2019    Procedure: ROBOTIC RADICAL RETROPUBIC PROSTATECTOMY WITH BILATERAL PELVIC LYMPH NODE DISSECTION;  Surgeon: Sean Perez MD;  Location: [facility];  Service: Urology    RELEASE TRIGGER FINGER Bilateral     SPINAL CORD DECOMPRESSION      L5    WISDOM TOOTH EXTRACTION          Family History   Problem Relation Age of Onset    Heart Disease Mother     Myocardial Infarction Brother         before age 65    Bone Cancer Brother     Hypertension Brother     Rheumatoid Arthritis Brother        Social History     Socioeconomic History    Marital status:      Spouse name: Not on file    Number of children: Not on file    Years of education: Not on file    Highest education level: Not on file   Occupational History     Not on file   Tobacco Use    Smoking status: Former     Current packs/day: 0.00     Types: Cigarettes    Smokeless tobacco: Never   Substance and Sexual Activity    Alcohol use: Yes     Comment: occasional    Drug use: No    Sexual activity: Not on file   Other Topics Concern    Parent/sibling w/ CABG, MI or angioplasty before 65F 55M? Yes     Comment: brother  of heart disease   Social History Narrative    Not on file     Social Determinants of Health     Financial Resource Strain: Low Risk  (2024)    Received from [employer]    Financial Resource Strain     Difficulty of Paying Living Expenses: 3     Difficulty of Paying Living Expenses: Not on file   Food Insecurity: No Food Insecurity (2024)    Received from [employer]    Food Insecurity     Worried About Running Out of Food in the Last Year: 1   Transportation Needs: No Transportation Needs (2024)    Received from [employer]    Transportation Needs     Lack of Transportation (Medical): 1   Physical Activity: Not on file   Stress: Not on file   Social Connections: Socially Integrated (2024)    Received from [employer]    Social Connections     Frequency of Communication with Friends and Family: 0   Interpersonal Safety: Not on file   Housing Stability: Low Risk  (2024)    Received from [employer]    Housing Stability     Unable to Pay for Housing in the Last Year: 1       Outpatient Encounter Medications as of 2024   Medication Sig Dispense Refill    acetaminophen (TYLENOL) 325 MG tablet Take 2 tablets (650 mg) by mouth every 4 hours as needed for other (mild pain) 100 tablet 0    buPROPion (WELLBUTRIN SR) 200 MG 12 hr tablet Take 1 tablet by mouth 2 times daily AM and noon      cholecalciferol 50 MCG ( UT) CAPS Take 2,000 Units by mouth daily        enalapril (VASOTEC) 20 MG tablet Take 20 mg by mouth daily       fluocinonide (LIDEX) 0.05 % external cream Apply sparingly twice daily for 3-4 weeks. 60 g 1    hydrochlorothiazide (HYDRODIURIL) 25 MG tablet Take 25 mg by mouth daily       HYDROcodone-acetaminophen (NORCO) 5-325 MG tablet Take 1-2 tablets by mouth every 4 hours as needed for moderate to severe pain 30 tablet 0    hydrOXYzine (ATARAX) 25 MG tablet Take 1 tablet (25 mg) by mouth every 6 hours as needed for other (adjuvent pain, muscle spasm) 30 tablet 0    ibuprofen (ADVIL/MOTRIN) 600 MG tablet Take 1 tablet (600 mg) by mouth every 6 hours as needed for pain (mild) 30 tablet 0    Multiple Vitamins-Minerals (GILBERT MULTIVITAMIN FOR MEN) TABS Take 1 tablet by mouth daily       omeprazole (PRILOSEC OTC) 20 MG EC tablet Take 20 mg by mouth twice a week On Sunday and Thursday      rosuvastatin (CRESTOR) 20 MG tablet Take 1 tablet by mouth daily       triamcinolone (NASACORT) 55 MCG/ACT nasal aerosol Spray 2 sprays in nostril every evening        No facility-administered encounter medications on file as of 7/23/2024.             O:   NAD, WDWN, Alert & Oriented, Mood & Affect wnl, Vitals stable   Here today alone   There were no vitals taken for this visit.   General appearance normal   Vitals stable   Alert, oriented and in no acute distress     Gritty papules on crown of scalp x 6, right temple x 1   Stuck on papules and brown macules on trunk and ext   Red papules on trunk  Brown papules and macules with regular pigment network and borders on torso and extremities   Pink scaly scalp   The remainder of skin exam is normal       Eyes: Conjunctivae/lids:Normal     ENT: Lips: normal    MSK:Normal    Pulm: Breathing Normal    Lymph Nodes: No Head and Neck Lymphadenopathy     Neuro/Psych: Orientation:Alert and Orientedx3 ; Mood/Affect:normal   A/P:  1. Actinic keratoses on crown of scalp x 6, right temple x 1   LN2:  Treated with LN2 for 5s for 1-2 cycles.  Warned risks of blistering, pain, pigment change, scarring, and incomplete resolution.  Advised patient to return if lesions do not completely resolve.  Wound care sheet given.  2. Seborrheic Dermatitis   Use ketoconazole shampoo, leave on scalp for 5 minutes then rinse. Use twice weekly. Apply lidex solution twice weekly for two week then as needed.   3. 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 I explained the need for monthly skin exams including and taught the patient how to do this. Patient was asked about new or changing moles . I discussed with patient signs and symptoms that could arise in the setting of recurrent locoregional or metastatic disease. In addition, the need to undergo every 12 month dermatologic full skin survey and evaluation given that patients with a diagnosis of melanoma are at risk of recurrence (local and distant) and of subsequent de juwan melanoma.    4. Seborrheic keratosis, lentigo, angioma, benign nevi, history of BCC  BENIGN LESIONS DISCUSSED WITH PATIENT:  I discussed the specifics of tumor, prognosis, and genetics of benign lesions.  I explained that treatment of these lesions would be purely cosmetic and not medically neccessary.  I discussed with patient different removal options including excision, cautery and /or laser.      Nature and genetics of benign skin lesions dicussed with patient.  Signs and Symptoms of skin cancer discussed with patient.  ABCDEs of melanoma reviewed with patient.  Patient encouraged to perform monthly skin exams.  UV precautions reviewed with patient.  Risks of non-melanoma skin cancer discussed with patient   Return to clinic in one year or sooner if needed.

## 2024-07-23 NOTE — PATIENT INSTRUCTIONS
WOUND CARE INSTRUCTIONS   FOR CRYOSURGERY   This area treated with liquid nitrogen should form a blister (areas treated may or may not blister-skin may just turn dark and slough off). You do not need to bandage the area unless a blister forms and breaks (which may be a few days). When the blister breaks, begin daily dressing changes as follows:  1) Clean and dry the area with tap water using clean Q-tip or sterile gauze pad.   2) Apply Polysporin ointment or Bacitracin ointment over entire wound. Do NOT use Neosporin ointment.   3) Cover the wound with a band-aid or sterile non-stick gauze pad and micropore paper tape.   REPEAT THESE INSTRUCTIONS AT LEAST ONCE A DAY UNTIL THE WOUND HAS COMPLETELY HEALED.   It is an old wives tale that a wound heals better when it is exposed to air and allowed to dry out. The wound will heal faster with a better cosmetic result if it is kept moist with ointment and covered with a bandage.   Do not let the wound dry out.   IMPORTANT INFORMATION ON REVERSE SIDE   Supplies Needed:   *Cotton tipped applicators (Q-tips)   *Polysporin ointment or Bacitracin ointment (NOT NEOSPORIN)   *Band-aids, or non stick gauze pads and micropore paper tape   PATIENT INFORMATION   During the healing process you will notice a number of changes. All wounds develop a small halo of redness surrounding the wound. This means healing is occurring. Severe itching with extensive redness usually indicates sensitivity to the ointment or bandage tape used to dress the wound. You should call our office if this develops.   Swelling and/or discoloration around your surgical site is common, particularly when performed around the eye.   All wounds normally drain. The larger the wound the more drainage there will be. After 7-10 days, you will notice the wound beginning to shrink and new skin will begin to grow. The wound is healed when you can see skin has formed over the entire area. A healed wound has a healthy, shiny  look to the surface and is red to dark pink in color to normalize. Wounds may take approximately 4-6 weeks to heal. Larger wounds may take 6-8 weeks. After the wound is healed you may discontinue dressing changes.   You may experience a sensation of tightness as your wound heals. This is normal and will gradually subside.   Your healed wound may be sensitive to temperature changes. This sensitivity improves with time, but if you re having a lot of discomfort, try to avoid temperature extremes.   Patients frequently experience itching after their wound appears to have healed because of the continue healing under the skin. Plain Vaseline will help relieve the itching.       Seborrheic Dermatitis can cause an itchy scaly scalp and rash on face and neck. It is    caused by a reaction to yeast, that normally inhabits our skin.    To treat your seborrheic dermatitis I prescribed:     *Ketoconazole shampoo: Wash 2-3 times a week. When washing hair keep on scalp for    5 minutes and wash down face    *Fluocinonide solution: Apply twice daily to itchy scalp as needed, do not use on face.

## 2024-07-23 NOTE — NURSING NOTE
Chief Complaint   Patient presents with    Skin Check     No Concerns        There were no vitals filed for this visit.  Wt Readings from Last 1 Encounters:   08/24/22 92.1 kg (203 lb)       Micheline Hood LPN .................7/23/2024

## 2025-02-28 NOTE — MR AVS SNAPSHOT
"              After Visit Summary   2017    Jay Carter    MRN: [id number]           Patient Information     Date Of Birth          1950        Visit Information        Provider Department      2017 12:00 PM [facility]U [facility]OT [facility]IC [facility]        Today's Diagnoses     Need for prophylactic vaccination and inoculation against influenza    -  1       Follow-ups after your visit        Who to contact     If you have questions or need follow up information about today's clinic visit or your schedule please contact [facility] directly at [phone].  Normal or non-critical lab and imaging results will be communicated to you by Superprotonichart, letter or phone within 4 business days after the clinic has received the results. If you do not hear from us within 7 days, please contact the clinic through RadLogicst or phone. If you have a critical or abnormal lab result, we will notify you by phone as soon as possible.  Submit refill requests through PadProof or call your pharmacy and they will forward the refill request to us. Please allow 3 business days for your refill to be completed.          Additional Information About Your Visit        MyChart Information     PadProof lets you send messages to your doctor, view your test results, renew your prescriptions, schedule appointments and more. To sign up, go to [email or web address] . Click on \"Log in\" on the left side of the screen, which will take you to the Welcome page. Then click on \"Sign up Now\" on the right side of the page.     You will be asked to enter the access code listed below, as well as some personal information. Please follow the directions to create your username and password.     Your access code is: [email or web address]  Expires: 3/1/2018 12:05 PM     Your access code will  in 90 days. If you need help or a new code, please call your [facility] or [phone].        Care [facility]     This is your Care " I would recommend continuing with the antibiotic but add some steroids to help with the cough.  Use Tylenol for the fevers while on the steroid.  Recheck on Monday if she is still coughing.  Which pharmacy for the steroids? [facility]. This could be used by other organizations to access your [place] medical records  [id number]         Blood Pressure from Last 3 Encounters:   No data found for BP    Weight from Last 3 Encounters:   No data found for Wt              We Performed the Following     ADMIN INFLUENZA (For MEDICARE Patients ONLY) []     FLU VACCINE, INCREASED ANTIGEN, PRESV FREE, AGE 65+ [76834]        Primary Care Provider Office Phone # Fax #    [facility] [phone] [address] [address] [place] [id number]        Equal Access to Services     JAROD GUARDADO : Hadii aad ku hadasho Soomaali, waaxda luqadaha, qaybta kaalmada adeegyatho, urvashi lane . So [facility] [phone].    ATENCIÓN: Si habla español, tiene a mccarthy disposición servicios gratuitos de asistencia lingüística. Llame al [phone].    We comply with applicable federal civil rights laws and [place] laws. We do not discriminate on the basis of race, color, national origin, age, disability, sex, sexual orientation, or gender identity.            Thank you!     Thank you for choosing [facility]  for your care. Our goal is always to provide you with excellent care. Hearing back from our patients is one way we can continue to improve our services. Please take a few minutes to complete the written survey that you may receive in the mail after your visit with us. Thank you!             Your Updated Medication List - Protect others around you: Learn how to safely use, store and throw away your medicines at www.disposemymeds.org.          This list is accurate as of: 12/1/17 12:05 PM.  Always use your most recent med list.                   Brand Name Dispense Instructions for use Diagnosis    ATIVAN PO      Take 0.5 mg by mouth every 6 hours as needed for muscle spasms        ENALAPRIL MALEATE PO      Take 20 mg by mouth daily        HYDROCHLOROTHIAZIDE PO      Take 25 mg by mouth daily         GILBERT MULTIVITAMIN FOR MEN Tabs      Take 1 tablet by mouth daily        OXYCODONE HCL PO      Take 10 mg by mouth every 6 hours as needed        sulindac 200 MG tablet    CLINORIL    20 tablet    Take 1 tablet (200 mg) by mouth 2 times daily (with meals)        VIAGRA 100 MG tablet   Generic drug:  sildenafil      Take 100 mg by mouth daily as needed Take 1 tablet by mouth once daily if needed for Erectile Dysfunction. Take 30min to 4 hours before sexual activity. Max 100mg/24hr.        WELLBUTRIN XL PO      Take 300 mg by mouth daily

## 2025-04-23 ENCOUNTER — TRANSCRIBE ORDERS (OUTPATIENT)
Dept: OTHER | Age: 75
End: 2025-04-23

## 2025-04-23 DIAGNOSIS — Z12.11 SCREENING FOR COLON CANCER: Primary | ICD-10-CM

## 2025-06-13 ENCOUNTER — ANESTHESIA EVENT (OUTPATIENT)
Dept: GASTROENTEROLOGY | Facility: CLINIC | Age: 75
End: 2025-06-13
Payer: COMMERCIAL

## 2025-06-13 NOTE — ANESTHESIA PREPROCEDURE EVALUATION
Anesthesia Pre-Procedure Evaluation    Patient: Jay Carter   MRN: [id number] : 1950          Procedure : Procedure(s):  Colonoscopy, Screening         Past Medical History:   Diagnosis Date    Basal cell carcinoma     Malignant melanoma (H)       Past Surgical History:   Procedure Laterality Date    ARTHROPLASTY KNEE Left 2021    Procedure: Left Total Knee Arthroplasty;  Surgeon: Nico Acosta MD;  Location: [facility]    ARTHROSCOPY SHOULDER      converted to open, complications    ARTHROSCOPY SHOULDER ROTATOR CUFF REPAIR Bilateral     BACK SURGERY      CERVICAL LAMINECTOMY      hemilaminectomy C6-7    DISCECTOMY LUMBAR POSTERIOR MICROSCOPIC TWO LEVELS Right 2022    Procedure: Lumbar 4-5 Lateral Discectomy,Right Lumbar 5-Sacral 1 Foraminotomy-right;  Surgeon: Holland Ritchie MD;  Location: [facility]    OTHER SURGICAL HISTORY      excision pilonidal cyst    CA LAP,PROSTATECTOMY,RADICAL,W/NERVE SPARE,INCL ROBOTIC Bilateral 2019    Procedure: ROBOTIC RADICAL RETROPUBIC PROSTATECTOMY WITH BILATERAL PELVIC LYMPH NODE DISSECTION;  Surgeon: Sean Perez MD;  Location: [facility] - [place], [place];  Service: Urology    RELEASE TRIGGER FINGER Bilateral     SPINAL CORD DECOMPRESSION      L5    WISDOM TOOTH EXTRACTION        Allergies   Allergen Reactions    Ampicillin Rash    Vancomycin Tinnitus    Ciprofloxacin Other (See Comments)     Hearing/audio disturbances    Simvastatin Other (See Comments)     Nightmares      Adhesive Tape Rash      Social History     Tobacco Use    Smoking status: Former     Current packs/day: 0.00     Types: Cigarettes    Smokeless tobacco: Never   Substance Use Topics    Alcohol use: Yes     Comment: occasional      Wt Readings from Last 1 Encounters:   22 92.1 kg (203 lb)        Anesthesia Evaluation   Pt has had prior anesthetic. Type: General.        ROS/MED HX  ENT/Pulmonary:     (+) sleep apnea,                                       Neurologic:      "  Cardiovascular:     (+) Dyslipidemia hypertension- -   -  - -                                      METS/Exercise Tolerance:     Hematologic:  - neg hematologic  ROS     Musculoskeletal:       GI/Hepatic:  - neg GI/hepatic ROS     Renal/Genitourinary:  - neg Renal ROS     Endo:  - neg endo ROS     Psychiatric/Substance Use:     (+) psychiatric history depression       Infectious Disease:       Malignancy:       Other:              Physical Exam  Airway  Mallampati: II  TM distance: >3 FB  Neck ROM: full  Upper bite lip test: I  Mouth opening: >= 4 cm    Cardiovascular - normal exam   Dental   (+) Minor Abnormalities - some fillings, tiny chips      Pulmonary - normal exam      Neurological - normal exam  He appears awake, alert and oriented x3.    Other Findings       OUTSIDE LABS:  CBC:   Lab Results   Component Value Date    WBC 10.8 10/26/2019    HGB 12.2 (L) 08/25/2021    HGB 14.7 10/26/2019    HCT 43.8 10/26/2019     10/26/2019     02/06/2019     BMP:   Lab Results   Component Value Date     01/29/2018    POTASSIUM 3.9 01/29/2018    CHLORIDE 103 01/29/2018    CO2 29 01/29/2018    BUN 16 01/29/2018    CR 0.89 01/29/2018     (H) 08/25/2021    GLC 91 02/06/2019     COAGS: No results found for: \"PTT\", \"INR\", \"FIBR\"  POC: No results found for: \"BGM\", \"HCG\", \"HCGS\"  HEPATIC: No results found for: \"ALBUMIN\", \"PROTTOTAL\", \"ALT\", \"AST\", \"GGT\", \"ALKPHOS\", \"BILITOTAL\", \"BILIDIRECT\", \"TRACEY\"  OTHER:   Lab Results   Component Value Date    KOLTON 8.8 01/29/2018       Anesthesia Plan    ASA Status:  2      NPO Status: NPO Appropriate   Anesthesia Type: General.  Airway: natural airway.  Induction: intravenous.   Techniques and Equipment:       - Monitoring Plan: standard ASA monitoring     Consents    Anesthesia Plan(s) and associated risks, benefits, and realistic alternatives discussed. Questions answered and patient/representative(s) expressed understanding.     - Discussed: surgeon, " proceduralist     - Discussed with:  Patient               Postoperative Care    Pain management: multimodal analgesia.     Comments:                   EVGENY Garrett CRNA    I have reviewed the pertinent notes and labs in the chart from the past 30 days and (re)examined the patient.  Any updates or changes from those notes are reflected in this note.    Clinically Significant Risk Factors Present on Admission                   # Hypertension: Noted on problem list

## 2025-06-15 ENCOUNTER — HEALTH MAINTENANCE LETTER (OUTPATIENT)
Age: 75
End: 2025-06-15

## 2025-06-16 ENCOUNTER — ANESTHESIA (OUTPATIENT)
Dept: GASTROENTEROLOGY | Facility: CLINIC | Age: 75
End: 2025-06-16
Payer: COMMERCIAL

## 2025-06-16 ENCOUNTER — HOSPITAL ENCOUNTER (OUTPATIENT)
Facility: CLINIC | Age: 75
Discharge: HOME | End: 2025-06-16
Attending: SURGERY | Admitting: SURGERY
Payer: COMMERCIAL

## 2025-06-16 VITALS
TEMPERATURE: 98 F | HEIGHT: 71 IN | OXYGEN SATURATION: 98 % | DIASTOLIC BLOOD PRESSURE: 79 MMHG | SYSTOLIC BLOOD PRESSURE: 140 MMHG | RESPIRATION RATE: 16 BRPM | BODY MASS INDEX: 28.42 KG/M2 | WEIGHT: 203 LBS | HEART RATE: 66 BPM

## 2025-06-16 LAB — COLONOSCOPY: NORMAL

## 2025-06-16 PROCEDURE — 45380 COLONOSCOPY AND BIOPSY: CPT | Performed by: SURGERY

## 2025-06-16 PROCEDURE — 250N000011 HC RX IP 250 OP 636: Performed by: NURSE ANESTHETIST, CERTIFIED REGISTERED

## 2025-06-16 PROCEDURE — 45385 COLONOSCOPY W/LESION REMOVAL: CPT | Performed by: SURGERY

## 2025-06-16 PROCEDURE — 250N000009 HC RX 250: Performed by: NURSE ANESTHETIST, CERTIFIED REGISTERED

## 2025-06-16 PROCEDURE — 370N000017 HC ANESTHESIA TECHNICAL FEE, PER MIN: Performed by: SURGERY

## 2025-06-16 PROCEDURE — 88305 TISSUE EXAM BY PATHOLOGIST: CPT | Mod: TC | Performed by: SURGERY

## 2025-06-16 PROCEDURE — 258N000003 HC RX IP 258 OP 636: Performed by: PHYSICIAN ASSISTANT

## 2025-06-16 RX ORDER — OXYCODONE HYDROCHLORIDE 5 MG/1
5 TABLET ORAL
Status: DISCONTINUED | OUTPATIENT
Start: 2025-06-16 | End: 2025-06-16 | Stop reason: HOSPADM

## 2025-06-16 RX ORDER — SODIUM CHLORIDE, SODIUM LACTATE, POTASSIUM CHLORIDE, CALCIUM CHLORIDE 600; 310; 30; 20 MG/100ML; MG/100ML; MG/100ML; MG/100ML
INJECTION, SOLUTION INTRAVENOUS CONTINUOUS
Status: DISCONTINUED | OUTPATIENT
Start: 2025-06-16 | End: 2025-06-16 | Stop reason: HOSPADM

## 2025-06-16 RX ORDER — LIDOCAINE 40 MG/G
CREAM TOPICAL
Status: DISCONTINUED | OUTPATIENT
Start: 2025-06-16 | End: 2025-06-16 | Stop reason: HOSPADM

## 2025-06-16 RX ORDER — FENTANYL CITRATE 50 UG/ML
25 INJECTION, SOLUTION INTRAMUSCULAR; INTRAVENOUS
Status: DISCONTINUED | OUTPATIENT
Start: 2025-06-16 | End: 2025-06-16 | Stop reason: HOSPADM

## 2025-06-16 RX ORDER — ONDANSETRON 4 MG/1
4 TABLET, ORALLY DISINTEGRATING ORAL EVERY 30 MIN PRN
Status: DISCONTINUED | OUTPATIENT
Start: 2025-06-16 | End: 2025-06-16 | Stop reason: HOSPADM

## 2025-06-16 RX ORDER — PROPOFOL 10 MG/ML
INJECTION, EMULSION INTRAVENOUS PRN
Status: DISCONTINUED | OUTPATIENT
Start: 2025-06-16 | End: 2025-06-16

## 2025-06-16 RX ORDER — DEXAMETHASONE SODIUM PHOSPHATE 4 MG/ML
4 INJECTION, SOLUTION INTRA-ARTICULAR; INTRALESIONAL; INTRAMUSCULAR; INTRAVENOUS; SOFT TISSUE
Status: DISCONTINUED | OUTPATIENT
Start: 2025-06-16 | End: 2025-06-16 | Stop reason: HOSPADM

## 2025-06-16 RX ORDER — NALOXONE HYDROCHLORIDE 0.4 MG/ML
0.1 INJECTION, SOLUTION INTRAMUSCULAR; INTRAVENOUS; SUBCUTANEOUS
Status: DISCONTINUED | OUTPATIENT
Start: 2025-06-16 | End: 2025-06-16 | Stop reason: HOSPADM

## 2025-06-16 RX ORDER — PROPOFOL 10 MG/ML
INJECTION, EMULSION INTRAVENOUS CONTINUOUS PRN
Status: DISCONTINUED | OUTPATIENT
Start: 2025-06-16 | End: 2025-06-16

## 2025-06-16 RX ORDER — ONDANSETRON 2 MG/ML
4 INJECTION INTRAMUSCULAR; INTRAVENOUS EVERY 30 MIN PRN
Status: DISCONTINUED | OUTPATIENT
Start: 2025-06-16 | End: 2025-06-16 | Stop reason: HOSPADM

## 2025-06-16 RX ORDER — OXYCODONE HYDROCHLORIDE 5 MG/1
10 TABLET ORAL
Status: DISCONTINUED | OUTPATIENT
Start: 2025-06-16 | End: 2025-06-16 | Stop reason: HOSPADM

## 2025-06-16 RX ORDER — LIDOCAINE HYDROCHLORIDE 20 MG/ML
INJECTION, SOLUTION INFILTRATION; PERINEURAL PRN
Status: DISCONTINUED | OUTPATIENT
Start: 2025-06-16 | End: 2025-06-16

## 2025-06-16 RX ADMIN — PROPOFOL 50 MG: 10 INJECTION, EMULSION INTRAVENOUS at 10:08

## 2025-06-16 RX ADMIN — PROPOFOL 150 MCG/KG/MIN: 10 INJECTION, EMULSION INTRAVENOUS at 10:08

## 2025-06-16 RX ADMIN — LIDOCAINE HYDROCHLORIDE 80 MG: 20 INJECTION, SOLUTION INFILTRATION; PERINEURAL at 10:08

## 2025-06-16 RX ADMIN — SODIUM CHLORIDE, POTASSIUM CHLORIDE, SODIUM LACTATE AND CALCIUM CHLORIDE: 600; 310; 30; 20 INJECTION, SOLUTION INTRAVENOUS at 09:43

## 2025-06-16 ASSESSMENT — ACTIVITIES OF DAILY LIVING (ADL)
ADLS_ACUITY_SCORE: 49

## 2025-06-16 NOTE — ANESTHESIA POSTPROCEDURE EVALUATION
Patient: Jay Carter    Procedure: Procedure(s):  Colonoscopy, Screening       Anesthesia Type:  General    Note:  Disposition: Outpatient   Postop Pain Control: Uneventful            Sign Out: Well controlled pain   PONV: No   Neuro/Psych: Uneventful            Sign Out: Acceptable/Baseline neuro status   Airway/Respiratory: Uneventful            Sign Out: Acceptable/Baseline resp. status   CV/Hemodynamics: Uneventful            Sign Out: Acceptable CV status; No obvious hypovolemia; No obvious fluid overload   Other NRE: NONE   DID A NON-ROUTINE EVENT OCCUR? No           Last vitals:  Vitals:    06/16/25 0858   BP: (!) 146/79   Pulse: 62   Temp: 36.7  C (98  F)   SpO2: 100%       Electronically Signed By: EVGENY Velez CRNA  June 16, 2025  10:30 AM

## 2025-06-16 NOTE — ANESTHESIA CARE TRANSFER NOTE
Patient: Jay Carter    Procedure: Procedure(s):  Colonoscopy, Screening       Diagnosis: Screen for colon cancer [Z12.11]  Diagnosis Additional Information: No value filed.    Anesthesia Type:   General     Note:    Oropharynx: oropharynx clear of all foreign objects and spontaneously breathing  Level of Consciousness: drowsy  Oxygen Supplementation: room air    Independent Airway: airway patency satisfactory and stable  Dentition: dentition unchanged  Vital Signs Stable: post-procedure vital signs reviewed and stable  Report to RN Given: handoff report given  Patient transferred to: PACU    Handoff Report: Identifed the Patient, Identified the Reponsible Provider, Reviewed the pertinent medical history, Discussed the surgical course, Reviewed Intra-OP anesthesia mangement and issues during anesthesia, Set expectations for post-procedure period and Allowed opportunity for questions and acknowledgement of understanding      Vitals:  Vitals Value Taken Time   BP     Temp     Pulse     Resp     SpO2         Electronically Signed By: EVGENY Velez CRNA  June 16, 2025  10:29 AM

## 2025-06-16 NOTE — H&P
[facility]    Pre-Endoscopy History and Physical     Jay Carter MRN# [id number]   YOB: 1950 Age: 74 year old     Date of Procedure: 6/16/2025  Primary care provider: Wegener, Brita L  Type of Endoscopy: Colonoscopy with possible biopsy, possible polypectomy  Reason for Procedure: Screening  Type of Anesthesia Anticipated: MAC    HPI:    Jay is a 74 year old male who will be undergoing the above procedure.  Colonoscopy, screening. Last one 10 years ago (at outside hospital); nl per patient. No famhx colon ca. No blood thinners.    A history and physical has been performed. The patient's medications and allergies have been reviewed. The risks and benefits of the procedure and the sedation options and risks were discussed with the patient.  All questions were answered and informed consent was obtained.      He denies a personal or family history of anesthesia complications or bleeding disorders.     Patient Active Problem List   Diagnosis    Hypertension    Hyperlipidemia    Obstructive sleep apnea syndrome    Major depressive disorder, single episode, mild    Status post total knee replacement    Melanoma in situ of left upper extremity including shoulder (H)    History of malignant neoplasm of prostate    Benign essential hypertension    Basal cell carcinoma of scalp and skin of neck    Actinic keratosis        Past Medical History:   Diagnosis Date    Basal cell carcinoma     Malignant melanoma (H)         Past Surgical History:   Procedure Laterality Date    ARTHROPLASTY KNEE Left 8/24/2021    Procedure: Left Total Knee Arthroplasty;  Surgeon: Nico Acosta MD;  Location: [facility]    ARTHROSCOPY SHOULDER      converted to open, complications    ARTHROSCOPY SHOULDER ROTATOR CUFF REPAIR Bilateral     BACK SURGERY      CERVICAL LAMINECTOMY      hemilaminectomy C6-7    DISCECTOMY LUMBAR POSTERIOR MICROSCOPIC TWO LEVELS Right 8/24/2022    Procedure: Lumbar 4-5  Lateral Discectomy,Right Lumbar 5-Sacral 1 Foraminotomy-right;  Surgeon: Holland Ritchie MD;  Location: [facility]    OTHER SURGICAL HISTORY      excision pilonidal cyst    [place] LAP,PROSTATECTOMY,RADICAL,W/NERVE SPARE,INCL ROBOTIC Bilateral 2/6/2019    Procedure: ROBOTIC RADICAL RETROPUBIC PROSTATECTOMY WITH BILATERAL PELVIC LYMPH NODE DISSECTION;  Surgeon: Sean Perez MD;  Location: [facility] [place];  Service: Urology    RELEASE TRIGGER FINGER Bilateral     SPINAL CORD DECOMPRESSION      L5    WISDOM TOOTH EXTRACTION         Social History     Tobacco Use    Smoking status: Former     Current packs/day: 0.00     Types: Cigarettes    Smokeless tobacco: Never   Substance Use Topics    Alcohol use: Yes     Comment: occasional       Family History   Problem Relation Age of Onset    Heart Disease Mother     Myocardial Infarction Brother         before age 65    Bone Cancer Brother     Hypertension Brother     Rheumatoid Arthritis Brother        Prior to Admission medications    Medication Sig Start Date End Date Taking? Authorizing Provider   acetaminophen (TYLENOL) 325 MG tablet Take 2 tablets (650 mg) by mouth every 4 hours as needed for other (mild pain) 8/24/21   Nico Acosta MD   buPROPion (WELLBUTRIN SR) 200 MG 12 hr tablet Take 1 tablet by mouth 2 times daily AM and noon 6/13/21   Reported, Patient   cholecalciferol 50 MCG (2000 UT) CAPS Take 2,000 Units by mouth daily  4/20/20   Reported, Patient   enalapril (VASOTEC) 20 MG tablet Take 20 mg by mouth daily  7/14/21   Reported, Patient   fluocinonide (LIDEX) 0.05 % external cream Apply sparingly twice daily for 3-4 weeks. 7/20/23   Verona Haskins PA-C   fluocinonide (LIDEX) 0.05 % external solution Apply sparingly twice daily for two weeks then as needed. 7/23/24   Verona Haskins PA-C   hydrochlorothiazide (HYDRODIURIL) 25 MG tablet Take 25 mg by mouth daily  12/15/20   Reported, Patient   HYDROcodone-acetaminophen (NORCO)  "5-325 MG tablet Take 1-2 tablets by mouth every 4 hours as needed for moderate to severe pain 8/24/22   Holland Ritchie MD   hydrOXYzine (ATARAX) 25 MG tablet Take 1 tablet (25 mg) by mouth every 6 hours as needed for other (adjuvent pain, muscle spasm) 8/24/22   Holland Ritchie MD   ibuprofen (ADVIL/MOTRIN) 600 MG tablet Take 1 tablet (600 mg) by mouth every 6 hours as needed for pain (mild) 8/24/21   Nico Acosta MD   ketoconazole (NIZORAL) 2 % external shampoo Leave on scalp for 5 minutes then rinse. Use twice weekly. 7/23/24   Verona Haskins PA-C   Multiple Vitamins-Minerals (GILBERT MULTIVITAMIN FOR MEN) TABS Take 1 tablet by mouth daily  1/28/15   Reported, Patient   omeprazole (PRILOSEC OTC) 20 MG EC tablet Take 20 mg by mouth twice a week On Sunday and Thursday 4/20/20   Reported, Patient   rosuvastatin (CRESTOR) 20 MG tablet Take 1 tablet by mouth daily     Reported, Patient   triamcinolone (NASACORT) 55 MCG/ACT nasal aerosol Spray 2 sprays in nostril every evening  11/16/20   Reported, Patient       Allergies   Allergen Reactions    Ampicillin Rash    Vancomycin Tinnitus    Ciprofloxacin Other (See Comments)     Hearing/audio disturbances    Simvastatin Other (See Comments)     Nightmares      Adhesive Tape Rash        REVIEW OF SYSTEMS:   5 point ROS negative except as noted above in HPI, including Gen., Resp., CV, GI &  system review.    PHYSICAL EXAM:   BP (!) 146/79   Pulse 62   Temp 98  F (36.7  C) (Oral)   Ht 1.803 m (5' 11\")   Wt 92.1 kg (203 lb)   SpO2 100%   BMI 28.31 kg/m   Estimated body mass index is 28.31 kg/m  as calculated from the following:    Height as of this encounter: 1.803 m (5' 11\").    Weight as of this encounter: 92.1 kg (203 lb).   Constitutional: Awake, alert, no acute distress.  Eyes: No scleral icterus.  Conjunctiva are without injection.  ENMT: Mucous membranes moist, dentition and gums are intact.   Neck: Soft, supple, trachea midline.  "   Endocrine: n/a   Lymphatic: There is no cervical, submandibularadenopathy.  Respiratory: normal effort   Cardiovascular: S1, S2  Abdomen: Non-distended, non-tender,  No masses,  Musculoskeletal: No spinal or CVA tenderness. Full range of motion in the upper and lower extremities.    Skin: No skin rashes or lesions to inspection.  No petechia.    Neurologic: alerted and oriented 3x  Psychiatric: The patient's affect is not blunted and mood is appropriate.  DIAGNOSTICS:    Not indicated    IMPRESSION   ASA Class 2 - Mild systemic disease    PLAN:   Plan for Colonoscopy with possible biopsy, possible polypectomy. We discussed the risks, benefits and alternatives and the patient wished to proceed.  Patient is cleared for the above procedure.    The above has been forwarded to the attending physician. Any changes will be mentioned in their attestation.    Clari Grimaldo MD PGY-2  General Surgery Resident

## 2025-06-17 LAB
PATH REPORT.COMMENTS IMP SPEC: NORMAL
PATH REPORT.COMMENTS IMP SPEC: NORMAL
PATH REPORT.FINAL DX SPEC: NORMAL
PATH REPORT.GROSS SPEC: NORMAL
PATH REPORT.MICROSCOPIC SPEC OTHER STN: NORMAL
PATH REPORT.RELEVANT HX SPEC: NORMAL
PHOTO IMAGE: NORMAL

## 2025-06-17 PROCEDURE — 88305 TISSUE EXAM BY PATHOLOGIST: CPT | Mod: 26 | Performed by: PATHOLOGY

## 2025-06-19 ENCOUNTER — RESULTS FOLLOW-UP (OUTPATIENT)
Dept: SURGERY | Facility: CLINIC | Age: 75
End: 2025-06-19

## 2025-06-30 PROBLEM — D12.6 TUBULAR ADENOMA OF COLON: Status: ACTIVE | Noted: 2025-06-30

## (undated) DEVICE — SUCTION IRR SYSTEM W/TIP INTERPULSE

## (undated) DEVICE — DECANTER VIAL 2006S

## (undated) DEVICE — SYR 10ML FINGER CONTROL W/O NDL 309695

## (undated) DEVICE — Device

## (undated) DEVICE — GLOVE PROTEXIS W/NEU-THERA 6.5  2D73TE65

## (undated) DEVICE — PREP CHLORAPREP 26ML TINTED ORANGE  260815

## (undated) DEVICE — RX SURGIFLO HEMOSTATIC MATRIX W/THROMBIN 8ML 2994

## (undated) DEVICE — MIDAS REX DISSECTING TOOL  14MH30

## (undated) DEVICE — SUCTION MANIFOLD NEPTUNE 2 SYS 1 PORT 702-025-000

## (undated) DEVICE — BLADE SAW SAGITTAL STRK 21X90X1.19MM HD SYS 6 6221-119-090

## (undated) DEVICE — SU VICRYL 2-0 CT-1 27" UND J259H

## (undated) DEVICE — DRSG PRIMAPORE 03 1/8X6" 66000318

## (undated) DEVICE — GLOVE PROTEXIS MICRO 7.0  2D73PM70

## (undated) DEVICE — GLOVE PROTEXIS BLUE W/NEU-THERA 8.0  2D73EB80

## (undated) DEVICE — DRAPE STERI TOWEL SM 1000

## (undated) DEVICE — STOCKING SLEEVE COMPRESSION CALF MED

## (undated) DEVICE — BNDG COBAN 6"X5YDS STERILE

## (undated) DEVICE — SU STRATAFIX MONOCRYL 3-0 SPIRAL PS-2 30CM SXMP1B106

## (undated) DEVICE — ADH LIQUID MASTISOL TOPICAL VIAL 2-3ML 0523-48

## (undated) DEVICE — SOL WATER IRRIG 1000ML BOTTLE 07139-09

## (undated) DEVICE — DRAPE C-ARM 60X42" 1013

## (undated) DEVICE — SOL NACL 0.9% IRRIG 3000ML BAG 07972-08

## (undated) DEVICE — SU PDO 1 STRATAFIX 36X36CM CTX TAPERPOINT SXPD2B405

## (undated) DEVICE — GOWN XLG DISP 9545

## (undated) DEVICE — GLOVE PROTEXIS W/NEU-THERA 7.5  2D73TE75

## (undated) DEVICE — SOL NACL 0.9% IRRIG 1000ML BOTTLE 07138-09

## (undated) DEVICE — ENDO FORCEP ENDOJAW BIOPSY 3.7MMX230CM FB-222U

## (undated) DEVICE — GLOVE PROTEXIS BLUE W/NEU-THERA 6.5  2D73EB65

## (undated) DEVICE — BONE CEMENT KIT BOWL AND SPATULA STRK 6201-3-410

## (undated) DEVICE — TOURNIQUET SGL  BLADDER 30" DL PORT BLUE 5921-030-235

## (undated) DEVICE — GLOVE PROTEXIS W/NEU-THERA 8.0  2D73TE80

## (undated) DEVICE — BONE CLEANING TIP INTERPULSE  0210-010-000

## (undated) DEVICE — GLOVE PROTEXIS BLUE W/NEU-THERA 7.0  2D73EB70

## (undated) DEVICE — GOWN IMPERVIOUS SPECIALTY XLG/XLONG 32474

## (undated) DEVICE — SU VICRYL 1 CT-1 CR 8X18" J741D

## (undated) DEVICE — ESU PENCIL SMOKE EVAC W/ROCKER SWITCH 0703-047-000

## (undated) DEVICE — BLADE SAW OSCIL/SAG STRK 11X90X1.19MM 4/2000 4111-119-090

## (undated) DEVICE — DRSG AQUACEL AG 3.5X9.75" HYDROFIBER 412011

## (undated) DEVICE — DRAPE MICRO ZEISS MD 48"X120CM

## (undated) DEVICE — NDL ANGIOCATH 14GA 2" 4088

## (undated) DEVICE — ENDO SNARE EXACTO COLD 9MM LOOP 2.4MMX230CM 00711115

## (undated) DEVICE — SUCTION TIP YANKAUER STR K87

## (undated) DEVICE — SU MONOCRYL 2-0 CT-1 36" UND Y945H

## (undated) DEVICE — DRAPE SHEET REV FOLD 3/4 9349

## (undated) DEVICE — SYR 20ML LL W/O NDL

## (undated) DEVICE — SU VICRYL 1 CT-1 36" UND J947H

## (undated) RX ORDER — ACETAMINOPHEN 325 MG/1
TABLET ORAL
Status: DISPENSED
Start: 2021-08-24

## (undated) RX ORDER — CEFAZOLIN SODIUM 2 G/100ML
INJECTION, SOLUTION INTRAVENOUS
Status: DISPENSED
Start: 2021-08-24

## (undated) RX ORDER — GABAPENTIN 100 MG/1
CAPSULE ORAL
Status: DISPENSED
Start: 2022-08-24

## (undated) RX ORDER — ONDANSETRON 2 MG/ML
INJECTION INTRAMUSCULAR; INTRAVENOUS
Status: DISPENSED
Start: 2022-08-24

## (undated) RX ORDER — ACETAMINOPHEN 325 MG/1
TABLET ORAL
Status: DISPENSED
Start: 2022-08-24

## (undated) RX ORDER — CELECOXIB 200 MG/1
CAPSULE ORAL
Status: DISPENSED
Start: 2022-08-24

## (undated) RX ORDER — LIDOCAINE HYDROCHLORIDE 10 MG/ML
INJECTION, SOLUTION EPIDURAL; INFILTRATION; INTRACAUDAL; PERINEURAL
Status: DISPENSED
Start: 2022-08-24

## (undated) RX ORDER — LIDOCAINE HCL/EPINEPHRINE/PF 2%-1:200K
VIAL (ML) INJECTION
Status: DISPENSED
Start: 2021-08-24

## (undated) RX ORDER — MAGNESIUM SULFATE HEPTAHYDRATE 40 MG/ML
INJECTION, SOLUTION INTRAVENOUS
Status: DISPENSED
Start: 2021-08-24

## (undated) RX ORDER — BUPIVACAINE HYDROCHLORIDE 2.5 MG/ML
INJECTION, SOLUTION EPIDURAL; INFILTRATION; INTRACAUDAL
Status: DISPENSED
Start: 2022-08-24

## (undated) RX ORDER — OXYCODONE HYDROCHLORIDE 5 MG/1
TABLET ORAL
Status: DISPENSED
Start: 2022-08-24

## (undated) RX ORDER — GABAPENTIN 100 MG/1
CAPSULE ORAL
Status: DISPENSED
Start: 2021-08-24

## (undated) RX ORDER — FENTANYL CITRATE 50 UG/ML
INJECTION, SOLUTION INTRAMUSCULAR; INTRAVENOUS
Status: DISPENSED
Start: 2021-08-24

## (undated) RX ORDER — HYDROXYZINE HYDROCHLORIDE 25 MG/1
TABLET, FILM COATED ORAL
Status: DISPENSED
Start: 2022-08-24

## (undated) RX ORDER — TRANEXAMIC ACID 650 MG/1
TABLET ORAL
Status: DISPENSED
Start: 2021-08-24

## (undated) RX ORDER — ROPIVACAINE HYDROCHLORIDE 5 MG/ML
INJECTION, SOLUTION EPIDURAL; INFILTRATION; PERINEURAL
Status: DISPENSED
Start: 2021-08-24

## (undated) RX ORDER — CEFAZOLIN SODIUM/WATER 2 G/20 ML
SYRINGE (ML) INTRAVENOUS
Status: DISPENSED
Start: 2022-08-24

## (undated) RX ORDER — DEXAMETHASONE SODIUM PHOSPHATE 4 MG/ML
INJECTION, SOLUTION INTRA-ARTICULAR; INTRALESIONAL; INTRAMUSCULAR; INTRAVENOUS; SOFT TISSUE
Status: DISPENSED
Start: 2022-08-24

## (undated) RX ORDER — PROPOFOL 10 MG/ML
INJECTION, EMULSION INTRAVENOUS
Status: DISPENSED
Start: 2022-08-24

## (undated) RX ORDER — EPHEDRINE SULFATE 50 MG/ML
INJECTION, SOLUTION INTRAMUSCULAR; INTRAVENOUS; SUBCUTANEOUS
Status: DISPENSED
Start: 2022-08-24

## (undated) RX ORDER — FENTANYL CITRATE 50 UG/ML
INJECTION, SOLUTION INTRAMUSCULAR; INTRAVENOUS
Status: DISPENSED
Start: 2022-08-24

## (undated) RX ORDER — FENTANYL CITRATE-0.9 % NACL/PF 10 MCG/ML
PLASTIC BAG, INJECTION (ML) INTRAVENOUS
Status: DISPENSED
Start: 2022-08-24

## (undated) RX ORDER — FENTANYL CITRATE-0.9 % NACL/PF 10 MCG/ML
PLASTIC BAG, INJECTION (ML) INTRAVENOUS
Status: DISPENSED
Start: 2021-08-24